# Patient Record
Sex: MALE | Race: WHITE | NOT HISPANIC OR LATINO | Employment: FULL TIME | URBAN - METROPOLITAN AREA
[De-identification: names, ages, dates, MRNs, and addresses within clinical notes are randomized per-mention and may not be internally consistent; named-entity substitution may affect disease eponyms.]

---

## 2017-03-16 ENCOUNTER — OFFICE VISIT (OUTPATIENT)
Dept: SLEEP MEDICINE | Facility: HOSPITAL | Age: 57
End: 2017-03-16

## 2017-03-16 VITALS
SYSTOLIC BLOOD PRESSURE: 146 MMHG | BODY MASS INDEX: 45.1 KG/M2 | WEIGHT: 315 LBS | DIASTOLIC BLOOD PRESSURE: 64 MMHG | OXYGEN SATURATION: 93 % | HEIGHT: 70 IN | HEART RATE: 94 BPM

## 2017-03-16 DIAGNOSIS — G47.10 HYPERSOMNIA: ICD-10-CM

## 2017-03-16 DIAGNOSIS — E66.01 MORBID OBESITY, UNSPECIFIED OBESITY TYPE (HCC): ICD-10-CM

## 2017-03-16 DIAGNOSIS — G47.33 SEVERE OBSTRUCTIVE SLEEP APNEA: Primary | ICD-10-CM

## 2017-03-16 PROCEDURE — 99213 OFFICE O/P EST LOW 20 MIN: CPT | Performed by: INTERNAL MEDICINE

## 2017-03-16 RX ORDER — ARIPIPRAZOLE 5 MG/1
5 TABLET ORAL DAILY
COMMUNITY
End: 2018-08-22

## 2017-03-16 NOTE — PROGRESS NOTES
Subjective:     Chief Complaint:   Chief Complaint   Patient presents with   • Follow-up       HPI:    Joselito Chowdary III is a 57 y.o. male here for follow-up of obstructive sleep apnea.    Since starting PAP sleep problem has: decreased  Currently using PAP: yes Hours of usage during the night: 6    Amount of sleep per night : 6 hours  Average length of time it takes to fall asleep : 10 minutes  Number of awakenings per night : 1     He feels fatigue (tiredness, exhaustion, lethargy) in the daytime even when not sleepy? Infrequently  He feels sleepy (or struggles to stay awake) in the daytime? Infrequently    Laurel Scale scored as 9/24.    Type of mask: full face mask    He (since starting PAP or since last visit) has problems with the following:   Pressure from the mask 0 - No Problem  Skin irritation from the mask 1 - Mild Problems  Mask coming off face 1 - Mild Problems  Air leaks from the mask 1 - Mild Problems  Dry mouth or throat 1 - Mild Problems  Nasal congestion 1 - Mild Problems    I reviewed his PAP download:  Average pressure: 13  Average AHI:  2  Average minutes in large leak per night: 5      Current medications are:   Current Outpatient Prescriptions:   •  ARIPiprazole (ABILIFY) 5 MG tablet, Take 5 mg by mouth Daily., Disp: , Rfl:   •  aspirin 81 MG EC tablet, Take 81 mg by mouth Daily., Disp: , Rfl:   •  B-D UF III MINI PEN NEEDLES 31G X 5 MM misc, , Disp: , Rfl:   •  buPROPion XL (WELLBUTRIN XL) 300 MG 24 hr tablet, , Disp: , Rfl:   •  JARDIANCE 25 MG tablet, , Disp: , Rfl:   •  lamoTRIgine (LaMICtal) 200 MG tablet, , Disp: , Rfl:   •  LANTUS SOLOSTAR 100 UNIT/ML injection pen, , Disp: , Rfl:   •  losartan-hydrochlorothiazide (HYZAAR) 100-25 MG per tablet, , Disp: , Rfl:   •  metFORMIN (GLUCOPHAGE) 500 MG tablet, , Disp: , Rfl:   •  NOVOLOG FLEXPEN 100 UNIT/ML solution pen-injector sc pen, , Disp: , Rfl:   •  ONETOUCH VERIO test strip, , Disp: , Rfl:   •  potassium chloride (K-DUR) 10 MEQ CR  tablet, , Disp: , Rfl:   •  simvastatin (ZOCOR) 20 MG tablet, , Disp: , Rfl:   •  VICTOZA 18 MG/3ML solution pen-injector, , Disp: , Rfl:   •  Cariprazine HCl (VRAYLAR) 1.5 MG capsule capsule, Take 1.5 mg by mouth Daily., Disp: , Rfl:   •  cefdinir (OMNICEF) 300 MG capsule, , Disp: , Rfl:   •  HYDROcod Polst-CPM Polst ER (TUSSIONEX PENNKINETIC) 10-8 MG/5ML ER suspension, , Disp: , Rfl:   •  MethylPREDNISolone (MEDROL, CARLOS,) 4 MG tablet, , Disp: , Rfl:   •  PROAIR  (90 BASE) MCG/ACT inhaler, , Disp: , Rfl: .      The patient's relevant past medical, surgical, family and social history were reviewed and updated in Epic as appropriate.     ROS:    Review of Systems   Constitutional: Positive for fatigue.   HENT: Positive for congestion.    Respiratory: Positive for apnea.          Objective:    Physical Exam   Constitutional: He is oriented to person, place, and time. He appears well-developed and well-nourished.   HENT:   Head: Normocephalic and atraumatic.   Mouth/Throat: Oropharynx is clear and moist.   Class III airway   Neck: Neck supple. No thyromegaly present.   Cardiovascular: Normal rate and regular rhythm.  Exam reveals no gallop and no friction rub.    No murmur heard.  Pulmonary/Chest: Effort normal. No respiratory distress. He has no wheezes. He has no rales.   Abdominal: Soft. Bowel sounds are normal.   Musculoskeletal: He exhibits no edema.   Neurological: He is alert and oriented to person, place, and time.   Skin: Skin is warm and dry.   Psychiatric: He has a normal mood and affect. His behavior is normal.   Vitals reviewed.      Data:    Patient's PAP download was personally reviewed including raw data and results.    Assessment:    Problem List Items Addressed This Visit        Pulmonary Problems    Severe obstructive sleep apnea - Primary       Other    Hypersomnia    Morbid obesity          Hypersomnia improved.  AHI acceptable on download.    Plan:     No change in PAP settings.  No change  in his mask size or type.  Continued efforts at further weight loss.  I urged 7-8 hours of sleep per night on average.  I renewed supplies for the next year.  Follow-up scheduled.  If problems in the interim he knows how to contact us or his [employer] company.  Discussed the above in detail with the patient and any family present.        Signed by  Yuri Mccall MD

## 2017-12-07 ENCOUNTER — OFFICE VISIT (OUTPATIENT)
Dept: SLEEP MEDICINE | Facility: HOSPITAL | Age: 57
End: 2017-12-07

## 2017-12-07 VITALS
OXYGEN SATURATION: 96 % | DIASTOLIC BLOOD PRESSURE: 60 MMHG | HEART RATE: 93 BPM | HEIGHT: 70 IN | BODY MASS INDEX: 44.82 KG/M2 | WEIGHT: 313.05 LBS | SYSTOLIC BLOOD PRESSURE: 130 MMHG

## 2017-12-07 DIAGNOSIS — G47.33 SEVERE OBSTRUCTIVE SLEEP APNEA: Primary | ICD-10-CM

## 2017-12-07 DIAGNOSIS — G47.10 HYPERSOMNIA: ICD-10-CM

## 2017-12-07 DIAGNOSIS — E66.01 MORBID OBESITY (HCC): ICD-10-CM

## 2017-12-07 PROCEDURE — 99213 OFFICE O/P EST LOW 20 MIN: CPT | Performed by: INTERNAL MEDICINE

## 2017-12-07 RX ORDER — INSULIN DEGLUDEC INJECTION 100 U/ML
90 INJECTION, SOLUTION SUBCUTANEOUS
Refills: 8 | COMMUNITY
Start: 2017-11-18 | End: 2021-12-08

## 2017-12-07 NOTE — PROGRESS NOTES
Subjective:     Chief Complaint:   Chief Complaint   Patient presents with   • Follow-up       HPI:    Joselito Chowdary III is a 57 y.o. male here for follow-up of obstructive sleep apnea.    He is using CPAP.  It is auto set with a pressure range of 12-20.  He uses a fullface mask.  He was started on therapy the beginning of the year and has continued on it since then.  Compliance is good.  He complains of ongoing daytime somnolence.  He is averaging 7 hours of usage per night.  His McKean sleepiness scale is elevated at 15.  He does not think any of his medications are causing any excessive drowsiness.    Further details are as follows:    Since last visit sleep problem has: increased  Currently using PAP: yes Hours of usage during the night: 7    Amount of sleep per night : 7 hours  Average length of time it takes to fall asleep : 20 minutes  Number of awakenings per night : 2     He feels fatigue (tiredness, exhaustion, lethargy) in the daytime even when not sleepy? Often  He feels sleepy (or struggles to stay awake) in the daytime? Often    McKean Scale scored as 15/24.    Type of mask: full face mask    He (since starting PAP or since last visit) has problems with the following:   Pressure from the mask 2 - Mild Problems  Skin irritation from the mask 4 - Moderate Problems  Mask coming off face 1 - Mild Problems  Air leaks from the mask 2 - Mild Problems  Dry mouth or throat 2 - Mild Problems  Nasal congestion 1 - Mild Problems    I reviewed his PAP download:  Average pressure: 12  Average AHI:  2  Average minutes in large leak per night: 15      Current medications are:   Current Outpatient Prescriptions:   •  ARIPiprazole (ABILIFY) 5 MG tablet, Take 5 mg by mouth Daily., Disp: , Rfl:   •  aspirin 81 MG EC tablet, Take 81 mg by mouth Daily., Disp: , Rfl:   •  B-D UF III MINI PEN NEEDLES 31G X 5 MM misc, , Disp: , Rfl:   •  buPROPion XL (WELLBUTRIN XL) 300 MG 24 hr tablet, , Disp: , Rfl:   •  HYDROcod  Polst-CPM Polst ER (TUSSIONEX PENNKINETIC) 10-8 MG/5ML ER suspension, , Disp: , Rfl:   •  JARDIANCE 25 MG tablet, , Disp: , Rfl:   •  lamoTRIgine (LaMICtal) 200 MG tablet, , Disp: , Rfl:   •  metFORMIN (GLUCOPHAGE) 500 MG tablet, , Disp: , Rfl:   •  NOVOLOG FLEXPEN 100 UNIT/ML solution pen-injector sc pen, , Disp: , Rfl:   •  ONETOUCH VERIO test strip, , Disp: , Rfl:   •  potassium chloride (K-DUR) 10 MEQ CR tablet, , Disp: , Rfl:   •  simvastatin (ZOCOR) 20 MG tablet, , Disp: , Rfl:   •  TRESIBA FLEXTOUCH 100 UNIT/ML solution pen-injector injection, inject 100 units sub-q at bedtime, Disp: , Rfl: 8  •  VICTOZA 18 MG/3ML solution pen-injector, , Disp: , Rfl:   •  PROAIR  (90 BASE) MCG/ACT inhaler, , Disp: , Rfl: .      The patient's relevant past medical, surgical, family and social history were reviewed and updated in Epic as appropriate.     ROS:    Review of Systems   Constitutional: Positive for fatigue.   Respiratory: Positive for apnea.    Psychiatric/Behavioral: Negative for sleep disturbance.         Objective:    Physical Exam   Constitutional: He is oriented to person, place, and time. He appears well-developed and well-nourished.   HENT:   Head: Normocephalic and atraumatic.   Mouth/Throat: Oropharynx is clear and moist.   Class III airway   Neck: Neck supple. No thyromegaly present.   Cardiovascular: Normal rate and regular rhythm.  Exam reveals no gallop and no friction rub.    No murmur heard.  Pulmonary/Chest: Effort normal. No respiratory distress. He has no wheezes. He has no rales.   Musculoskeletal: He exhibits no edema.   Neurological: He is alert and oriented to person, place, and time.   Skin: Skin is warm and dry.   Psychiatric: He has a normal mood and affect. His behavior is normal.   Vitals reviewed.      Data:    Patient's PAP download was personally reviewed including raw data and results.    Assessment:    Problem List Items Addressed This Visit        Pulmonary Problems    Severe  obstructive sleep apnea - Primary    Relevant Orders    Polysomnography 4 or More Parameters With CPAP       Other    Hypersomnia    Relevant Orders    Polysomnography 4 or More Parameters With CPAP    Morbid obesity          Ongoing sleepiness despite what appears to be adequate treatment for his obstructive sleep apnea with auto CPAP.  Compliance is good.  Mask fit is good.  Total sleep time is around 7 hours per night.  AHI on the download is normal.    Plan:     1. He needs a full night in lab titration to assess efficacy of his CPAP given the fact that he has ongoing sleepiness.  2. Long-term weight loss  3. Discussed all the above with the patient and I will see him in follow-up after his titration is complete.      Discussed in detail with the patient.  He will call prior to his follow up visit for any new problems.    Signed by  Yuri Mccall MD

## 2017-12-28 ENCOUNTER — HOSPITAL ENCOUNTER (OUTPATIENT)
Dept: SLEEP MEDICINE | Facility: HOSPITAL | Age: 57
Discharge: HOME | End: 2017-12-28
Attending: INTERNAL MEDICINE | Admitting: INTERNAL MEDICINE

## 2017-12-28 VITALS
BODY MASS INDEX: 45.07 KG/M2 | WEIGHT: 314.82 LBS | OXYGEN SATURATION: 95 % | HEART RATE: 86 BPM | SYSTOLIC BLOOD PRESSURE: 119 MMHG | HEIGHT: 70 IN | DIASTOLIC BLOOD PRESSURE: 71 MMHG

## 2017-12-28 DIAGNOSIS — G47.10 HYPERSOMNIA: ICD-10-CM

## 2017-12-28 DIAGNOSIS — G47.33 SEVERE OBSTRUCTIVE SLEEP APNEA: ICD-10-CM

## 2017-12-28 PROCEDURE — 95811 POLYSOM 6/>YRS CPAP 4/> PARM: CPT | Performed by: INTERNAL MEDICINE

## 2017-12-28 PROCEDURE — 95811 POLYSOM 6/>YRS CPAP 4/> PARM: CPT

## 2017-12-29 DIAGNOSIS — R06.83 SNORING: ICD-10-CM

## 2017-12-29 DIAGNOSIS — G47.33 OSA (OBSTRUCTIVE SLEEP APNEA): Primary | ICD-10-CM

## 2018-01-02 ENCOUNTER — DOCUMENTATION (OUTPATIENT)
Dept: SLEEP MEDICINE | Facility: HOSPITAL | Age: 58
End: 2018-01-02

## 2018-01-02 NOTE — PROGRESS NOTES
Left message for patient to call back regarding sleep study. He just needs to be advised that his minimum pressure has been increased via [employer] following his 12/28/17 titration.  The modem should push it through in a couple of days.  He can call the DME if it does not go through.  Order is pended to staff to fax after Dr. Mccall signs it.  He also needs a follow-up visit scheduled.

## 2018-03-20 ENCOUNTER — OFFICE VISIT (OUTPATIENT)
Dept: SLEEP MEDICINE | Facility: HOSPITAL | Age: 58
End: 2018-03-20

## 2018-03-20 VITALS
BODY MASS INDEX: 44.09 KG/M2 | HEART RATE: 95 BPM | HEIGHT: 70 IN | DIASTOLIC BLOOD PRESSURE: 66 MMHG | SYSTOLIC BLOOD PRESSURE: 140 MMHG | WEIGHT: 308 LBS | OXYGEN SATURATION: 96 %

## 2018-03-20 DIAGNOSIS — G47.33 SEVERE OBSTRUCTIVE SLEEP APNEA: ICD-10-CM

## 2018-03-20 DIAGNOSIS — G47.10 HYPERSOMNIA: Primary | ICD-10-CM

## 2018-03-20 DIAGNOSIS — E66.01 MORBID OBESITY (HCC): ICD-10-CM

## 2018-03-20 PROCEDURE — 99214 OFFICE O/P EST MOD 30 MIN: CPT | Performed by: INTERNAL MEDICINE

## 2018-03-20 RX ORDER — DAPAGLIFLOZIN 10 MG/1
1 TABLET, FILM COATED ORAL DAILY
Refills: 8 | COMMUNITY
Start: 2018-02-02 | End: 2021-07-12

## 2018-03-20 NOTE — PROGRESS NOTES
Subjective:     Chief Complaint:   Chief Complaint   Patient presents with   • Follow-up       HPI:    Joselito Chowdary III is a 58 y.o. male here for follow-up of obstructive sleep apnea.    He underwent a titration study.  His minimum auto pressure was increased to 14 based upon this study.  He continues to use auto CPAP at a range of 14-20.  He uses an [place] facemask.  He notes some leak at the lower aspect of the mask.    He continues to have daytime somnolence.  His [place] Sleepiness Scale is around 15.  He sleeps about 6 hours a night wrist upon his best estimation.  He uses his CPAP just under 7 hours a night on average.    Further details are as follows:    Since last visit sleep problem has: remained the same  Currently using PAP: yes Hours of usage during the night: 6    Amount of sleep per night : 6 hours  Average length of time it takes to fall asleep : 20 minutes  Number of awakenings per night : 3     He feels fatigue (tiredness, exhaustion, lethargy) in the daytime even when not sleepy? Often  He feels sleepy (or struggles to stay awake) in the daytime? Often    [place] Scale scored as 15/24.    Type of mask: full face mask    He (since starting PAP or since last visit) has problems with the following:   Pressure from the mask 4 - Moderate Problems  Skin irritation from the mask 5 - Moderate Problems  Mask coming off face 3 - Mild Problems  Air leaks from the mask 6 - Moderate Problems  Dry mouth or throat 3 - Mild Problems  Nasal congestion 6 - Moderate Problems    I reviewed his PAP download:  Average pressure: 13  Average AHI:  2  Average minutes in large leak per night: 35      Current medications are:   Current Outpatient Prescriptions:   •  ARIPiprazole (ABILIFY) 5 MG tablet, Take 5 mg by mouth Daily., Disp: , Rfl:   •  aspirin 81 MG EC tablet, Take 81 mg by mouth Daily., Disp: , Rfl:   •  B-D UF III MINI PEN NEEDLES 31G X 5 MM misc, , Disp: , Rfl:   •  buPROPion XL (WELLBUTRIN XL) 300  MG 24 hr tablet, , Disp: , Rfl:   •  FARXIGA 10 MG tablet, Take 1 tablet by mouth Daily. 200001, Disp: , Rfl: 8  •  HYDROcod Polst-CPM Polst ER (TUSSIONEX PENNKINETIC) 10-8 MG/5ML ER suspension, , Disp: , Rfl:   •  lamoTRIgine (LaMICtal) 200 MG tablet, , Disp: , Rfl:   •  metFORMIN (GLUCOPHAGE) 500 MG tablet, , Disp: , Rfl:   •  NOVOLOG FLEXPEN 100 UNIT/ML solution pen-injector sc pen, , Disp: , Rfl:   •  ONETOUCH VERIO test strip, , Disp: , Rfl:   •  potassium chloride (K-DUR) 10 MEQ CR tablet, , Disp: , Rfl:   •  PROAIR  (90 BASE) MCG/ACT inhaler, , Disp: , Rfl:   •  simvastatin (ZOCOR) 20 MG tablet, , Disp: , Rfl:   •  TRESIBA FLEXTOUCH 100 UNIT/ML solution pen-injector injection, inject 100 units sub-q at bedtime, Disp: , Rfl: 8  •  VICTOZA 18 MG/3ML solution pen-injector, , Disp: , Rfl: .      The patient's relevant past medical, surgical, family and social history were reviewed and updated in Epic as appropriate.     ROS:    Review of Systems   Constitutional: Positive for fatigue.   Respiratory: Positive for apnea.    Psychiatric/Behavioral: Positive for sleep disturbance.         Objective:    Physical Exam   Constitutional: He is oriented to person, place, and time. He appears well-developed and well-nourished.   HENT:   Head: Normocephalic and atraumatic.   Mouth/Throat: Oropharynx is clear and moist.   Class III airway   Neck: Neck supple. No thyromegaly present.   Cardiovascular: Normal rate and regular rhythm.  Exam reveals no gallop and no friction rub.    No murmur heard.  Pulmonary/Chest: Effort normal. No respiratory distress. He has no wheezes. He has no rales.   Musculoskeletal: He exhibits no edema.   Neurological: He is alert and oriented to person, place, and time.   Skin: Skin is warm and dry.   Psychiatric: He has a normal mood and affect. His behavior is normal.   Vitals reviewed.      Data:    Patient's PAP download was personally reviewed including raw data and  results.    Assessment:    Problem List Items Addressed This Visit        Pulmonary Problems    Severe obstructive sleep apnea       Other    Hypersomnia - Primary    Morbid obesity      Other Visit Diagnoses    None.         He has continued daytime somnolence despite optimal treatment of his obstructive sleep apnea based upon a titration study and his download.  A few caveats remain.  I think he should get closer to 8 hours of sleep per night and I discussed this with him frankly.  He also has some mask leak when his mask deteriorates but it is not severe and I don't think this is the cause of his sleepiness.  He does need to renew his mask on a regular basis and when it starts to leak.    Plan:     1. As above.  Increase sleep time.   2. Given the above, I think his sleepiness could be due to his medication therapy.  The pros and cons of his individual medications need to be weighed by his mental health professional.  Certainly, uncontrolled psychiatric/mood disorders can be a cause of sleepiness as well.  3. Discussed all the above with the patient.  Cautioned him against drowsy driving and any additional sedative use.  4. Closer than usual follow-up.      Discussed in detail with the patient.  He will call prior to his follow up visit for any new problems.    Signed by  Yuri Mccall MD

## 2018-04-21 ENCOUNTER — APPOINTMENT (OUTPATIENT)
Dept: GENERAL RADIOLOGY | Facility: HOSPITAL | Age: 58
End: 2018-04-21

## 2018-04-21 ENCOUNTER — HOSPITAL ENCOUNTER (EMERGENCY)
Facility: HOSPITAL | Age: 58
Discharge: HOME | End: 2018-04-21
Attending: EMERGENCY MEDICINE | Admitting: EMERGENCY MEDICINE

## 2018-04-21 VITALS
WEIGHT: 315 LBS | HEIGHT: 70 IN | RESPIRATION RATE: 16 BRPM | HEART RATE: 84 BPM | SYSTOLIC BLOOD PRESSURE: 150 MMHG | BODY MASS INDEX: 45.1 KG/M2 | DIASTOLIC BLOOD PRESSURE: 84 MMHG | OXYGEN SATURATION: 95 % | TEMPERATURE: 98 F

## 2018-04-21 DIAGNOSIS — M25.511 ARTHRALGIA OF RIGHT SHOULDER REGION: ICD-10-CM

## 2018-04-21 DIAGNOSIS — S40.011A CONTUSION OF RIGHT SHOULDER, INITIAL ENCOUNTER: Primary | ICD-10-CM

## 2018-04-21 PROCEDURE — 73030 X-RAY EXAM OF SHOULDER: CPT

## 2018-04-21 PROCEDURE — 99283 EMERGENCY DEPT VISIT LOW MDM: CPT

## 2018-04-21 RX ORDER — HYDROCODONE BITARTRATE AND ACETAMINOPHEN 7.5; 325 MG/1; MG/1
1 TABLET ORAL EVERY 6 HOURS PRN
Qty: 12 TABLET | Refills: 0 | Status: SHIPPED | OUTPATIENT
Start: 2018-04-21 | End: 2018-07-18

## 2018-04-21 RX ORDER — HYDROCODONE BITARTRATE AND ACETAMINOPHEN 7.5; 325 MG/1; MG/1
1 TABLET ORAL ONCE
Status: COMPLETED | OUTPATIENT
Start: 2018-04-21 | End: 2018-04-21

## 2018-04-21 RX ADMIN — HYDROCODONE BITARTRATE AND ACETAMINOPHEN 1 TABLET: 7.5; 325 TABLET ORAL at 15:12

## 2018-04-21 NOTE — ED PROVIDER NOTES
Subjective   58 year-old male presents to emergency for complaining of right shoulder pain after a fall in ER.  Patient states he was weeding ER when he slipped and fell landing on the lateral aspect of right shoulder.  His been unable to move the arm at the shoulder since.  He denies other symptoms or injuries.        Upper Extremity Issue   Location:  Shoulder  Shoulder location:  R shoulder  Injury: yes    Time since incident:  1 hour  Mechanism of injury comment:  Slip fall on the ground while weed eating.  Pain details:     Quality:  Aching and throbbing    Radiates to:  R shoulder    Severity:  Severe    Onset quality:  Sudden    Duration:  1 hour    Timing:  Constant    Progression:  Unchanged  Handedness:  Right-handed  Dislocation: no    Foreign body present:  No foreign bodies  Tetanus status:  Up to date  Prior injury to area:  No  Relieved by:  Immobilization  Worsened by:  Movement  Ineffective treatments:  None tried  Associated symptoms: decreased range of motion    Associated symptoms: no back pain, no muscle weakness, no neck pain, no numbness, no stiffness, no swelling and no tingling        Review of Systems   Musculoskeletal: Negative for back pain, neck pain and stiffness.        Per HPI   All other systems reviewed and are negative.      Past Medical History:   Diagnosis Date   • Diabetes mellitus    • Hypertension    • Respiratory infection        Allergies   Allergen Reactions   • Zestoretic [Lisinopril-Hydrochlorothiazide] Cough       Past Surgical History:   Procedure Laterality Date   • CARDIAC CATHETERIZATION     • CHOLECYSTECTOMY     • GALLBLADDER SURGERY         Family History   Problem Relation Age of Onset   • Cancer Mother    • Diabetes Father    • Heart disease Father    • Hypertension Father    • COPD Father        Social History     Social History   • Marital status:      Social History Main Topics   • Smoking status: Former Smoker   • Smokeless tobacco: Never Used   •  Alcohol use No   • Drug use: No     Other Topics Concern   • Not on file           Objective   Physical Exam   Constitutional: He is oriented to person, place, and time. He appears well-developed and well-nourished. No distress.   HENT:   Head: Normocephalic and atraumatic.   Right Ear: External ear normal.   Left Ear: External ear normal.   Nose: Nose normal.   Mouth/Throat: Oropharynx is clear and moist. No oropharyngeal exudate.   Eyes: Conjunctivae and EOM are normal. Pupils are equal, round, and reactive to light. Right eye exhibits no discharge. Left eye exhibits no discharge. No scleral icterus.   Neck: Normal range of motion. Neck supple. No JVD present. No tracheal deviation present. No thyromegaly present.   Cardiovascular: Normal rate and regular rhythm.    No murmur heard.  Pulmonary/Chest: Effort normal. No stridor. No respiratory distress.   Abdominal: Soft. He exhibits no distension.   Musculoskeletal: Normal range of motion. He exhibits tenderness. He exhibits no edema or deformity.   Tenderness to palpation to right shoulder diffusely with no obvious deformity.  He does have pain to the proximal humerus as well as the acromioclavicular aspect of the shoulder.  Range of motion is minimal due to pain level and guarding.  Capillary refill is brisk, radial pulse is strong and intact, elbow is with full range of motion, he is able to pronate and supinate the forearm without difficulty.   Neurological: He is alert and oriented to person, place, and time. No cranial nerve deficit. He exhibits normal muscle tone. Coordination normal.   Skin: Skin is warm and dry. No rash noted. He is not diaphoretic. No erythema. No pallor.   Psychiatric: He has a normal mood and affect. His behavior is normal. Judgment and thought content normal.   Nursing note and vitals reviewed.      Procedures        No results found for this or any previous visit (from the past 24 hour(s)).  Note: In addition to lab results from this  "visit, the labs listed above may include labs taken at another facility or during a different encounter within the last 24 hours. Please correlate lab times with ED admission and discharge times for further clarification of the services performed during this visit.    XR Shoulder 2+ View Right   Final Result   Chronic appearing findings.       This report was finalized on 4/21/2018 4:03 PM by Evens Spicer MD.        Vitals:    04/21/18 1347   BP: 150/84   BP Location: Left arm   Patient Position: Sitting   Pulse: 84   Resp: 16   Temp: 98 °F (36.7 °C)   TempSrc: Oral   SpO2: 95%   Weight: (!) 143 kg (315 lb 3.2 oz)   Height: 177.8 cm (70\")     Medications   HYDROcodone-acetaminophen (NORCO) 7.5-325 MG per tablet 1 tablet (1 tablet Oral Given 4/21/18 1512)     ECG/EMG Results (last 24 hours)     ** No results found for the last 24 hours. **            ED Course  ED Course   Comment By Time   No acute findings per x-ray.  I reviewed with Dr. Busch.  We will immobilize, refer to ortho for further evaluation.  Patient prefers referral to Dr. Mares. Nelson Juarez PA-C 04/21 2774                  [facility]    Final diagnoses:   Contusion of right shoulder, initial encounter   Arthralgia of right shoulder region            Nelson Juarez PA-C  04/21/18 2279    "

## 2018-04-21 NOTE — DISCHARGE INSTRUCTIONS
Cold compresses every 2-3 hours for 20 minutes today, warm compresses several similar fashion tomorrow.  Wear sling and swath for at least 3 days.  Follow-up with your primary care provider for recheck in 3 days.  Follow-up with Dr. Mares for orthopedic evaluation and further evaluation of her shoulder.  Return to the emergency department immediately if any change or worsening of symptoms.

## 2018-04-30 ENCOUNTER — OFFICE VISIT (OUTPATIENT)
Dept: ORTHOPEDIC SURGERY | Facility: CLINIC | Age: 58
End: 2018-04-30

## 2018-04-30 VITALS
BODY MASS INDEX: 44.19 KG/M2 | DIASTOLIC BLOOD PRESSURE: 100 MMHG | SYSTOLIC BLOOD PRESSURE: 183 MMHG | WEIGHT: 308.64 LBS | HEART RATE: 90 BPM | HEIGHT: 70 IN

## 2018-04-30 DIAGNOSIS — M25.511 ACUTE PAIN OF RIGHT SHOULDER: Primary | ICD-10-CM

## 2018-04-30 PROCEDURE — 99203 OFFICE O/P NEW LOW 30 MIN: CPT | Performed by: PHYSICIAN ASSISTANT

## 2018-04-30 RX ORDER — LOSARTAN POTASSIUM AND HYDROCHLOROTHIAZIDE 25; 100 MG/1; MG/1
1 TABLET ORAL DAILY
Refills: 5 | COMMUNITY
Start: 2018-03-22 | End: 2021-12-08

## 2018-04-30 RX ORDER — BREXPIPRAZOLE 2 MG/1
TABLET ORAL
COMMUNITY
Start: 2018-04-27 | End: 2021-01-05

## 2018-04-30 NOTE — PROGRESS NOTES
[facility] – [place], [place] Orthopaedic Surgery Clinic Note    Subjective     Pain of the Right Shoulder      HPI    Joselito Chowdary III is a 58 y.o. male. Right-hand-dominant.  Patient presents to the orthopedic clinic complaining of right shoulder pain.  He states approximately 9 days ago he was weed eating, slipped and fell on the lateral aspect of his shoulder.  Now he has pain and can't lift his arm more than 45° from his body.  He denies any other history of injury or trauma.  He denies any numbness or tingling into the extremity.  Pain scale reports maximum 6/10.  Severity the pain is moderate to severe.  Quality the pain is aching with intermittent sharpness and stabbing.  Associated symptoms include stiffness as well as symptoms related to giving away.  No reported fever, chills, night sweats or other constitutional symptoms.    DOI: 4/21/2018    Past Medical History:   Diagnosis Date   • Diabetes mellitus    • Hypertension    • Respiratory infection       Past Surgical History:   Procedure Laterality Date   • CARDIAC CATHETERIZATION     • CHOLECYSTECTOMY     • GALLBLADDER SURGERY        Family History   Problem Relation Age of Onset   • Cancer Mother    • Hypertension Mother    • Diabetes Father    • Heart disease Father    • Hypertension Father    • COPD Father      Social History     Social History   • Marital status:      Spouse name: N/A   • Number of children: N/A   • Years of education: N/A     Occupational History   • Not on file.     Social History Main Topics   • Smoking status: Former Smoker     Packs/day: 1.00     Years: 8.00     Types: Cigarettes     Start date: 1978     Quit date: 1986   • Smokeless tobacco: Never Used   • Alcohol use No   • Drug use: No   • Sexual activity: Defer     Other Topics Concern   • Not on file     Social History Narrative   • No narrative on file      Current Outpatient Prescriptions on File Prior to Visit   Medication Sig Dispense Refill   • ARIPiprazole (ABILIFY) 5 MG tablet  "Take 5 mg by mouth Daily.     • aspirin 81 MG EC tablet Take 81 mg by mouth Daily.     • B-D UF III MINI PEN NEEDLES 31G X 5 MM misc      • buPROPion XL (WELLBUTRIN XL) 300 MG 24 hr tablet      • FARXIGA 10 MG tablet Take 1 tablet by mouth Daily. 200001  8   • HYDROcod Polst-CPM Polst ER (TUSSIONEX PENNKINETIC) 10-8 MG/5ML ER suspension      • HYDROcodone-acetaminophen (NORCO) 7.5-325 MG per tablet Take 1 tablet by mouth Every 6 (Six) Hours As Needed for Moderate Pain . 12 tablet 0   • lamoTRIgine (LaMICtal) 200 MG tablet      • metFORMIN (GLUCOPHAGE) 500 MG tablet      • NOVOLOG FLEXPEN 100 UNIT/ML solution pen-injector sc pen      • ONETOUCH VERIO test strip      • potassium chloride (K-DUR) 10 MEQ CR tablet      • PROAIR  (90 BASE) MCG/ACT inhaler      • simvastatin (ZOCOR) 20 MG tablet      • TRESIBA FLEXTOUCH 100 UNIT/ML solution pen-injector injection inject 100 units sub-q at bedtime  8   • VICTOZA 18 MG/3ML solution pen-injector        No current facility-administered medications on file prior to visit.       Allergies   Allergen Reactions   • Zestoretic [Lisinopril-Hydrochlorothiazide] Cough        The following portions of the patient's history were reviewed and updated as appropriate: allergies, current medications, past family history, past medical history, past social history, past surgical history and problem list.    Review of Systems   Constitutional: Negative.    HENT: Negative.    Eyes: Negative.    Respiratory: Negative.    Cardiovascular: Negative.    Gastrointestinal: Negative.    Endocrine: Negative.    Genitourinary: Negative.    Musculoskeletal: Positive for arthralgias (shoulder pain).   Skin: Negative.    Allergic/Immunologic: Negative.    Neurological: Negative.    Hematological: Negative.    Psychiatric/Behavioral: Negative.         Objective      Physical Exam  BP (!) 183/100   Pulse 90   Ht 177.8 cm (70\")   Wt (!) 140 kg (308 lb 10.3 oz)   BMI 44.29 kg/m²     Body mass index " is 44.29 kg/m².    General  Mental Status - alert  General Appearance - cooperative, well groomed, not in acute distress  Orientation - Oriented X3  Build & Nutrition - well developed and well nourished  Posture - normal posture  Gait - normal gait     Integumentary  Global Assessment  Examination of related systems reveals - no lymphadenopathy  General Characteristics  Overall examination of the patient's skin reveals - no rashes, no evidence of scars, no suspicious lesions and no bruises.  Color - normal coloration of skin.    Ortho Exam  Musculoskeletal   Upper Extremity   Right Shoulder     Inspection and Palpation:     Tenderness - primary lateral aspect of the shoulder some mild discomfort anteriorly    Crepitus - none    Sensation is normal    Examination reveals no ecchymosis.        Strength and Tone:    Supraspinatus -3+/5     External Rotators-4/5    Infraspinatus - 5/5    Subscapularis - 4/5    Deltoid - 5/5     Range of Motion   Left shoulder:    Internal Rotation: ROM - T7    External Rotation: AROM - 80 degrees    Elevation through flexion: AROM - 180 degrees    Right Shoulder:    Internal Rotation: ROM - back right hip pocket    External Rotation: AROM - 45 degrees    Elevation through flexion: AROM - 45 degrees      Instability   Right shoulder    Sulcus sign negative    Apprehension test negative    Roberta relocation test negative    Jerk test negative     Impingement   Right shoulder    Araya-Santy impingement test negative    Neer impingement test mild discomfort     Functional Testing   Right shoulder    AC crossover adduction test mild discomfort    Abdominal compression test negative    Lift-off modified positive    Speed's test positive    Eden's test unable to get into position        Imaging/Studies  Reviewed 3 views of the right shoulder from 4/21/18.  No acute bony abnormality, fracture or dislocation noted.  Mild degenerative arthritic changes noted to the acromioclavicular joint and  mild degenerative changes noted to the glenohumeral joint.  See chart for official report.    Assessment:  1. Acute pain of right shoulder        Plan:  1. Discussion was had patient regarding exam, imaging, assessment and treatment options.  2. Patient has an MRI scheduled for Wednesday.  3. He has pain medication from the emergency room.  Patient is going down to over-the-counter acetaminophen/Tylenol when able.  4. Patient will follow back up next week after the MRI is completed to discuss results and treatment options.  5. Questions and concerns were answered.      Medical Decision Making  Management Options : prescription/IM medicine  Data/Risk: radiology tests    Eleanor Pearce PA-C  04/30/18  2:46 PM

## 2018-05-01 ENCOUNTER — TELEPHONE (OUTPATIENT)
Dept: ORTHOPEDIC SURGERY | Facility: CLINIC | Age: 58
End: 2018-05-01

## 2018-05-01 DIAGNOSIS — M25.511 ACUTE PAIN OF RIGHT SHOULDER: Primary | ICD-10-CM

## 2018-05-01 DIAGNOSIS — M75.101 TEAR OF RIGHT ROTATOR CUFF, UNSPECIFIED TEAR EXTENT: ICD-10-CM

## 2018-05-01 NOTE — TELEPHONE ENCOUNTER
This patient was supposed to have an MRI done of his shoulder tomorrow that was ordered by his PCP. His insurance would not approve it and he wanted to know if you could order one through our office and try and get it approved. Please advise.

## 2018-05-03 NOTE — TELEPHONE ENCOUNTER
MRI ordered - f/u after completed. If insurance still won't approve then f/u to discuss next step in care.

## 2018-05-05 ENCOUNTER — HOSPITAL ENCOUNTER (OUTPATIENT)
Dept: MRI IMAGING | Facility: HOSPITAL | Age: 58
Discharge: HOME | End: 2018-05-05
Admitting: PHYSICIAN ASSISTANT

## 2018-05-05 DIAGNOSIS — M75.101 TEAR OF RIGHT ROTATOR CUFF, UNSPECIFIED TEAR EXTENT: ICD-10-CM

## 2018-05-05 DIAGNOSIS — M25.511 ACUTE PAIN OF RIGHT SHOULDER: ICD-10-CM

## 2018-05-05 PROCEDURE — 73221 MRI JOINT UPR EXTREM W/O DYE: CPT

## 2018-05-07 ENCOUNTER — OFFICE VISIT (OUTPATIENT)
Dept: ORTHOPEDIC SURGERY | Facility: CLINIC | Age: 58
End: 2018-05-07

## 2018-05-07 ENCOUNTER — TELEPHONE (OUTPATIENT)
Dept: ORTHOPEDIC SURGERY | Facility: CLINIC | Age: 58
End: 2018-05-07

## 2018-05-07 VITALS — WEIGHT: 315 LBS | HEIGHT: 70 IN | BODY MASS INDEX: 45.1 KG/M2

## 2018-05-07 DIAGNOSIS — M75.121 COMPLETE TEAR OF RIGHT ROTATOR CUFF: Primary | ICD-10-CM

## 2018-05-07 PROCEDURE — 99213 OFFICE O/P EST LOW 20 MIN: CPT | Performed by: PHYSICIAN ASSISTANT

## 2018-05-07 NOTE — PROGRESS NOTES
"    [facility] – [place] Orthopaedic Surgery Clinic Note    Subjective     CC: Follow-up of the Right Shoulder (Post MRI)      HPI    Joselito Chowdary III is a 58 y.o. male. Patient returns to go over his MRI results.  He is approximately 2 weeks out from date of injury to his right shoulder (4/21/2018).  No significant change in pain with 5/10 for a pain scale.  Severity the pain remains moderate.  Quality the pain is once again aching with intermittent sharpness and stabbing.  No reported numbness or tingling into the distal extremity.  He denies any fever, chills, night sweats or other constitutional symptoms.      ROS:    Constiutional:Pt denies fever, chills, nausea, or vomiting.  MSK:as above    Objective      Past Medical History  Past Medical History:   Diagnosis Date   • Diabetes mellitus    • Hypertension    • Respiratory infection          Physical Exam  Ht 177.8 cm (70\")   Wt (!) 144 kg (317 lb 7.4 oz)   BMI 45.55 kg/m²     Body mass index is 45.55 kg/m².    Patient is well nourished and well developed.        Ortho Exam  Right shoulder  Skin intact without redness, warmth or swelling noted.  Tenderness lateral aspect of the shoulder with discomfort also to posterior and anterior shoulder.  Motion: Remains limited to 45° forward flexion, external rotation and back right hip pocket internal rotation  Motor/sensory: Grossly intact C4 to T1.    Imaging/Labs/EMG Reviewed:  MRI performed on 5/5/2018 was reviewed today. Evidence of complete full thickness tear with retraction supraspinatus.  Also evidence of tear along the infraspinatus and subscapularis.  Fraying noted to the labrum with degenerative changes.  Biceps tendon appears intact.  Significant amount of edema and joint effusion noted areas see chart for official report.    Assessment:  1. Complete tear of right rotator cuff        Plan:  1. Discussion was had with the patient regarding exam, imaging, assessment and treatment options.  2. Based on the MRI findings " of cuff tear with retraction I have referred the patient to Dr. Ortiz to discuss surgical intervention.  3. He'll follow up next week with Dr. Ortiz.  At that time will be determined what are his best treatment options.  4. Patient needs to discuss his possible need for surgery with his work before proceeding on.  5. Questions and concerns were answered.        Medical Decision Making  Data/Risk: radiology tests    Eleanor Pearce PA-C  05/07/18  1:56 PM

## 2018-05-09 ENCOUNTER — OFFICE VISIT (OUTPATIENT)
Dept: ORTHOPEDIC SURGERY | Facility: CLINIC | Age: 58
End: 2018-05-09

## 2018-05-09 VITALS
SYSTOLIC BLOOD PRESSURE: 161 MMHG | WEIGHT: 308.64 LBS | HEART RATE: 96 BPM | BODY MASS INDEX: 44.19 KG/M2 | DIASTOLIC BLOOD PRESSURE: 81 MMHG | HEIGHT: 70 IN

## 2018-05-09 DIAGNOSIS — M75.121 COMPLETE TEAR OF RIGHT ROTATOR CUFF: Primary | ICD-10-CM

## 2018-05-09 PROCEDURE — 99214 OFFICE O/P EST MOD 30 MIN: CPT | Performed by: ORTHOPAEDIC SURGERY

## 2018-05-09 NOTE — PROGRESS NOTES
[facility] – [place] Orthopaedic Surgery Clinic Note    Subjective     Chief Complaint   Patient presents with   • Right Shoulder - Pain        HPI      Joselito Chowdary III is a 58 y.o. male.  He complains of right shoulder pain.  He had a slip and fall at home April 21 and tore his right shoulder rotator cuff.  He presented to the emergency room saws primary care physician and was referred to me from Eleanor Pearce.  His pain is 5 out of 10 and throbbing.  He's unable to raise his arm completely.  He works in transport at the hospital.        Past Medical History:   Diagnosis Date   • Diabetes mellitus    • Hypertension    • Respiratory infection       Past Surgical History:   Procedure Laterality Date   • CARDIAC CATHETERIZATION     • CHOLECYSTECTOMY     • GALLBLADDER SURGERY        Family History   Problem Relation Age of Onset   • Cancer Mother    • Hypertension Mother    • Diabetes Father    • Heart disease Father    • Hypertension Father    • COPD Father      Social History     Social History   • Marital status:      Spouse name: N/A   • Number of children: N/A   • Years of education: N/A     Occupational History   • Not on file.     Social History Main Topics   • Smoking status: Former Smoker     Packs/day: 1.00     Years: 8.00     Types: Cigarettes     Start date: 1978     Quit date: 1986   • Smokeless tobacco: Never Used   • Alcohol use No   • Drug use: No   • Sexual activity: Defer     Other Topics Concern   • Not on file     Social History Narrative   • No narrative on file      Current Outpatient Prescriptions on File Prior to Visit   Medication Sig Dispense Refill   • ARIPiprazole (ABILIFY) 5 MG tablet Take 5 mg by mouth Daily.     • aspirin 81 MG EC tablet Take 81 mg by mouth Daily.     • B-D UF III MINI PEN NEEDLES 31G X 5 MM misc      • buPROPion XL (WELLBUTRIN XL) 300 MG 24 hr tablet      • FARXIGA 10 MG tablet Take 1 tablet by mouth Daily. 200001 8   • HYDROcod Polst-CPM Polst ER (TUSSIONEX  "PENNKINETIC) 10-8 MG/5ML ER suspension      • HYDROcodone-acetaminophen (NORCO) 7.5-325 MG per tablet Take 1 tablet by mouth Every 6 (Six) Hours As Needed for Moderate Pain . 12 tablet 0   • lamoTRIgine (LaMICtal) 200 MG tablet      • losartan-hydrochlorothiazide (HYZAAR) 100-25 MG per tablet Take 1 tablet by mouth Daily. 200001  5   • metFORMIN (GLUCOPHAGE) 500 MG tablet      • NOVOLOG FLEXPEN 100 UNIT/ML solution pen-injector sc pen      • ONETOUCH VERIO test strip      • potassium chloride (K-DUR) 10 MEQ CR tablet      • PROAIR  (90 BASE) MCG/ACT inhaler      • REXULTI 2 MG tablet      • simvastatin (ZOCOR) 20 MG tablet      • TRESIBA FLEXTOUCH 100 UNIT/ML solution pen-injector injection inject 100 units sub-q at bedtime  8   • VICTOZA 18 MG/3ML solution pen-injector        No current facility-administered medications on file prior to visit.       Allergies   Allergen Reactions   • Zestoretic [Lisinopril-Hydrochlorothiazide] Cough        The following portions of the patient's history were reviewed and updated as appropriate: allergies, current medications, past family history, past medical history, past social history, past surgical history and problem list.    Review of Systems   Constitutional: Negative.    HENT: Negative.    Eyes: Negative.    Respiratory: Negative.    Cardiovascular: Negative.    Gastrointestinal: Negative.    Endocrine: Negative.    Genitourinary: Negative.    Musculoskeletal: Positive for arthralgias.   Skin: Negative.    Allergic/Immunologic: Negative.    Neurological: Negative.    Hematological: Negative.    Psychiatric/Behavioral: Positive for decreased concentration.        Objective      Physical Exam  /81   Pulse 96   Ht 177.8 cm (70\")   Wt (!) 140 kg (308 lb 10.3 oz)   BMI 44.29 kg/m²     Body mass index is 44.29 kg/m².        GENERAL APPEARANCE: awake, alert & oriented x 3, in no acute distress and well developed, well nourished  PSYCH: normal mood and " affect  LUNGS:  breathing nonlabored, no wheezing  EYES: sclera anicteric, pupils equal  CARDIOVASCULAR: palpable pulses dorsalis pedis, palpable posterior tibial bilaterally. Capillary refill less than 2 seconds  INTEGUMENTARY: skin intact, no clubbing, cyanosis  NEUROLOGIC:  Normal Sensation and reflexes             Ortho Exam  Musculoskeletal   Upper Extremity   Right Shoulder     Inspection and Palpation:     Tenderness - none    Crepitus - none    Sensation is normal    Examination reveals no ecchymosis.      Strength and Tone:    Supraspinatus,  Infraspinatus - 4/5    Subscapularis - 5/5    Deltoid - 5/5     Range of Motion   Left shoulder:    Internal Rotation: ROM - T7    External Rotation: AROM - 180 degrees    Elevation through flexion: AROM - 180 degrees    Right Shoulder:    Internal Rotation: ROM - T7    External Rotation: AROM - 80 degrees    Elevation through flexion: AROM - 80 degrees     Abduction -80 degrees     Instability   Right shoulder    Sulcus sign negative    Apprehension test negative    Roberta relocation test negative    Jerk test negative   Impingement   Right shoulder    Araya impingement test positive    Neer impingement test positive   Functional Testing   Right shoulder    AC crossover adduction test negative    Abdominal compression test negative    Lift-off sign negative    Speed's test negative    Eden's test negative    Horriblower's sign negative       Imaging/Studies  Imaging Results (last 7 days)     ** No results found for the last 168 hours. **      I reviewed his MRI which shows a massive retracted full thickness tear of the supraspinatus and infraspinatus tendons    Assessment/Plan        ICD-10-CM ICD-9-CM   1. Complete tear of right rotator cuff M75.121 727.61     I recommend right shoulder arthroscopy with rotator cuff repair.  He does have diabetes which will complicate his healing.  He will be in a sling for 1 month.  Restrictions for a minimum of 4 months but most  likely 6 months because of the size of his tear.Treatment options and alternatives were discussed with patient and family.  Surgical versus non surgical treatment options were discussed as well.    We reviewed the nature of the planned procedure including the risks, benefits and potential complications.  Understanding was expressed and the decision was made to proceed with surgery.    Medical Decision Making  Management Options : over-the-counter medicine and major surgery with risk factors  Data/Risk: radiology tests and independent visualization of imaging, lab tests, or EMG/NCV    Isael Ortiz MD  05/09/18  9:35 AM

## 2018-05-15 ENCOUNTER — TELEPHONE (OUTPATIENT)
Dept: ORTHOPEDIC SURGERY | Facility: CLINIC | Age: 58
End: 2018-05-15

## 2018-05-15 NOTE — TELEPHONE ENCOUNTER
PATIENT'S FMLA COMPANY CALLED AND STATED THERE IS A GAP IN HIS COVERAGE FOR THE DATES OF 05/01/18-05/21/18, THEY WILL NEED TO BE REFILLED OUT AND SENT BACK WITH UPDATED INFORMATION. PLEASE CONTACT [employer] WITH QUESTIONS

## 2018-05-15 NOTE — TELEPHONE ENCOUNTER
PATIENT WAS NOT PUT OFF WORK BY [employer] OR DR WOOTEN PRIOR TO SURGERY, THEREFORE FMLA WILL START AS OF SURGERY DATE ON 5/22/18.  I HAVE TALKED TO PATIENT AND ADVISED HIM THAT THERE IS NO DOCUMENTATION STATING FOR HIM TO BE OFF WORK PRIOR TO SURGERY DATE.

## 2018-05-21 ENCOUNTER — OFFICE VISIT (OUTPATIENT)
Dept: ORTHOPEDIC SURGERY | Facility: CLINIC | Age: 58
End: 2018-05-21

## 2018-05-21 VITALS
SYSTOLIC BLOOD PRESSURE: 157 MMHG | HEART RATE: 86 BPM | BODY MASS INDEX: 45.1 KG/M2 | WEIGHT: 315 LBS | HEIGHT: 70 IN | DIASTOLIC BLOOD PRESSURE: 77 MMHG

## 2018-05-21 DIAGNOSIS — M75.121 COMPLETE TEAR OF RIGHT ROTATOR CUFF: Primary | ICD-10-CM

## 2018-05-21 PROCEDURE — 99024 POSTOP FOLLOW-UP VISIT: CPT | Performed by: ORTHOPAEDIC SURGERY

## 2018-05-21 NOTE — PROGRESS NOTES
[facility] [place]    Subjective     Chief Complaint   Patient presents with   • Right Shoulder - Follow-up     1 week        HPI      Joselito Chowdary III is a 58 y.o. male.  He is here to discuss surgery tomorrow there was some questions about he wanted to have a pain pump catheter.        Past Medical History:   Diagnosis Date   • Diabetes mellitus    • Hypertension    • Respiratory infection       Past Surgical History:   Procedure Laterality Date   • CARDIAC CATHETERIZATION     • CHOLECYSTECTOMY     • GALLBLADDER SURGERY        Family History   Problem Relation Age of Onset   • Cancer Mother    • Hypertension Mother    • Diabetes Father    • Heart disease Father    • Hypertension Father    • COPD Father      Social History     Social History   • Marital status:      Spouse name: N/A   • Number of children: N/A   • Years of education: N/A     Occupational History   • Not on file.     Social History Main Topics   • Smoking status: Former Smoker     Packs/day: 1.00     Years: 8.00     Types: Cigarettes     Start date: 1978     Quit date: 1986   • Smokeless tobacco: Never Used   • Alcohol use No   • Drug use: No   • Sexual activity: Defer     Other Topics Concern   • Not on file     Social History Narrative   • No narrative on file      Current Outpatient Prescriptions on File Prior to Visit   Medication Sig Dispense Refill   • ARIPiprazole (ABILIFY) 5 MG tablet Take 5 mg by mouth Daily.     • aspirin 81 MG EC tablet Take 81 mg by mouth Daily.     • B-D UF III MINI PEN NEEDLES 31G X 5 MM misc      • buPROPion XL (WELLBUTRIN XL) 300 MG 24 hr tablet      • FARXIGA 10 MG tablet Take 1 tablet by mouth Daily. 200001 8   • HYDROcod Polst-CPM Polst ER (TUSSIONEX PENNKINETIC) 10-8 MG/5ML ER suspension      • HYDROcodone-acetaminophen (NORCO) 7.5-325 MG per tablet Take 1 tablet by mouth Every 6 (Six) Hours As Needed for Moderate Pain . 12 tablet 0   • lamoTRIgine (LaMICtal) 200 MG tablet      •  "losartan-hydrochlorothiazide (HYZAAR) 100-25 MG per tablet Take 1 tablet by mouth Daily. 200001 5   • metFORMIN (GLUCOPHAGE) 500 MG tablet      • NOVOLOG FLEXPEN 100 UNIT/ML solution pen-injector sc pen      • ONETOUCH VERIO test strip      • potassium chloride (K-DUR) 10 MEQ CR tablet      • PROAIR  (90 BASE) MCG/ACT inhaler      • REXULTI 2 MG tablet      • simvastatin (ZOCOR) 20 MG tablet      • TRESIBA FLEXTOUCH 100 UNIT/ML solution pen-injector injection inject 100 units sub-q at bedtime  8   • VICTOZA 18 MG/3ML solution pen-injector        No current facility-administered medications on file prior to visit.       Allergies   Allergen Reactions   • Zestoretic [Lisinopril-Hydrochlorothiazide] Cough        The following portions of the patient's history were reviewed and updated as appropriate: allergies, current medications, past family history, past medical history, past social history, past surgical history and problem list.    Review of Systems   Constitutional: Negative.    HENT: Negative.    Eyes: Negative.    Respiratory: Negative.    Cardiovascular: Negative.    Gastrointestinal: Negative.    Endocrine: Negative.    Genitourinary: Negative.    Musculoskeletal: Positive for arthralgias.   Skin: Negative.    Allergic/Immunologic: Negative.    Neurological: Negative.    Hematological: Negative.    Psychiatric/Behavioral: Negative.         Objective      Physical Exam  /77   Pulse 86   Ht 177.8 cm (70\")   Wt (!) 143 kg (315 lb 7.7 oz)   BMI 45.27 kg/m²     Body mass index is 45.27 kg/m².        GENERAL APPEARANCE: awake, alert & oriented x 3, in no acute distress and well developed, well nourished  PSYCH: normal mood and affect      Ortho Exam  Musculoskeletal   Upper Extremity   Right Shoulder     Inspection and Palpation:     Tenderness - none    Crepitus - none    Sensation is normal    Examination reveals no ecchymosis.      Strength and Tone:    Supraspinatus,  Infraspinatus - " 4/5    Subscapularis - 5/5    Deltoid - 5/5     Range of Motion   Left shoulder:    Internal Rotation: ROM - T7    External Rotation: AROM - 80 degrees    Elevation through flexion: AROM - 180 degrees    Right Shoulder:    Internal Rotation: ROM - T7    External Rotation: AROM - 80 degrees    Elevation through flexion: AROM - 80 degrees     Abduction -80 degrees     Instability   Right shoulder    Sulcus sign negative    Apprehension test negative    Roberta relocation test negative    Jerk test negative   Impingement   Right shoulder    Araya impingement test positive    Neer impingement test positive   Functional Testing   Right shoulder    AC crossover adduction test negative    Abdominal compression test negative    Lift-off sign negative    Speed's test negative    Eden's test negative    Horriblower's sign negative         Assessment/Plan        ICD-10-CM ICD-9-CM   1. Complete tear of right rotator cuff M75.121 727.61     He was here to discuss the pain pump catheter and anesthesia.  I discussed the pros and cons.  I prefer the surgeries to be done at the surgery center as long as is healthy to do it there.  All of his questions were answered.  We anticipate surgery tomorrow in the surgery Center under a single shot interscalene block with appropriate pain medicine prescription.  We discussed he is at increased risk because of his body mass index is 45.3.  But the reality is that could not be changed prior to his surgery.  The longer he goes with a massive tear more likely it'll be to fail with any type of repair    Medical Decision Making  Management Options : over-the-counter medicine and major surgery with risk factors      Isael Ortiz MD  05/21/18  10:55 AM         EMR Dragon/Transcription disclaimer:  Much of this encounter note is an electronic transcription of spoken language to printed text. Electronic transcription of spoken language may permit erroneous, or at times, nonsensical words or  phrases to be inadvertently transcribed. Although I have reviewed the note for such errors, some may still exist.

## 2018-05-22 ENCOUNTER — OUTSIDE FACILITY SERVICE (OUTPATIENT)
Dept: ORTHOPEDIC SURGERY | Facility: CLINIC | Age: 58
End: 2018-05-22

## 2018-05-22 ENCOUNTER — LAB REQUISITION (OUTPATIENT)
Dept: LAB | Facility: HOSPITAL | Age: 58
End: 2018-05-22

## 2018-05-22 DIAGNOSIS — M75.121 COMPLETE TEAR OF RIGHT ROTATOR CUFF: ICD-10-CM

## 2018-05-22 LAB — POTASSIUM BLDA-SCNC: 4.09 MMOL/L (ref 3.5–5.3)

## 2018-05-22 PROCEDURE — 84132 ASSAY OF SERUM POTASSIUM: CPT | Performed by: ORTHOPAEDIC SURGERY

## 2018-05-22 PROCEDURE — 29827 SHO ARTHRS SRG RT8TR CUF RPR: CPT | Performed by: ORTHOPAEDIC SURGERY

## 2018-05-30 ENCOUNTER — OFFICE VISIT (OUTPATIENT)
Dept: ORTHOPEDIC SURGERY | Facility: CLINIC | Age: 58
End: 2018-05-30

## 2018-05-30 DIAGNOSIS — Z98.890 STATUS POST RIGHT ROTATOR CUFF REPAIR: Primary | ICD-10-CM

## 2018-05-30 PROCEDURE — 99024 POSTOP FOLLOW-UP VISIT: CPT | Performed by: ORTHOPAEDIC SURGERY

## 2018-06-20 ENCOUNTER — OFFICE VISIT (OUTPATIENT)
Dept: ORTHOPEDIC SURGERY | Facility: CLINIC | Age: 58
End: 2018-06-20

## 2018-06-20 DIAGNOSIS — Z98.890 STATUS POST RIGHT ROTATOR CUFF REPAIR: Primary | ICD-10-CM

## 2018-06-20 PROCEDURE — 99024 POSTOP FOLLOW-UP VISIT: CPT | Performed by: ORTHOPAEDIC SURGERY

## 2018-06-20 NOTE — PROGRESS NOTES
Chief Complaint   Patient presents with   • Post-op     4 weeks status post (R) shoulder arthroscopic RCR, debridement of labrum 05/22/2018           HPI  He is follow-up right shoulder rotator cuff repair May 22 doing well.  He has no complaints.      There were no vitals filed for this visit.      Physical Exam:  Portals look good he is neurovascular intact.        ICD-10-CM ICD-9-CM   1. Status post right rotator cuff repair Z98.890 V45.89       Orders Placed This Encounter   Procedures   • Ambulatory Referral to Physical Therapy     He will start physical therapy for range of motion and follow-up in one month.

## 2018-06-29 ENCOUNTER — TELEPHONE (OUTPATIENT)
Dept: ORTHOPEDIC SURGERY | Facility: CLINIC | Age: 58
End: 2018-06-29

## 2018-07-06 ENCOUNTER — TELEPHONE (OUTPATIENT)
Dept: ORTHOPEDIC SURGERY | Facility: CLINIC | Age: 58
End: 2018-07-06

## 2018-07-06 NOTE — TELEPHONE ENCOUNTER
CALLED PATIENT TO INFORM FMLA PAPERWORK IS COMPLETED. NEED TO KNOW IF WANT FAXED AND NUMBER TO FAX TO OR IF HE WILL  FROM OFFICE.

## 2018-07-18 ENCOUNTER — OFFICE VISIT (OUTPATIENT)
Dept: ORTHOPEDIC SURGERY | Facility: CLINIC | Age: 58
End: 2018-07-18

## 2018-07-18 DIAGNOSIS — Z98.890 STATUS POST RIGHT ROTATOR CUFF REPAIR: Primary | ICD-10-CM

## 2018-07-18 PROCEDURE — 99024 POSTOP FOLLOW-UP VISIT: CPT | Performed by: ORTHOPAEDIC SURGERY

## 2018-07-18 NOTE — PROGRESS NOTES
Chief Complaint   Patient presents with   • Post-op Follow-up     8  weeks status post (R) shoulder arthroscopic RCR, debridement of labrum 05/22/2018           HPI he is follow-up right shoulder cuff repair doing well.    There were no vitals filed for this visit.      Physical Exam:  Peripheral Vascular   Right Upper Extremity    No cyanotic nail beds    Pink nail beds and rapid capillary refill   Palpation    Radial Pulse - Bilaterally normal    Musculoskeletal   Upper Extremity   Right Shoulder    Inspection and palpation:    Tenderness - mild    Swelling - none    Sensation - normal   ROJM:   Right:   External Rotation: PROM - 30 degrees   Elevation through flexion: PROM - 100 degrees              ICD-10-CM ICD-9-CM   1. Status post right rotator cuff repair Z98.890 V45.89       Orders Placed This Encounter   Procedures   • Ambulatory Referral to Physical Therapy     He will go to physical therapy and follow-up in one month.  He will continue work on.  His work restriction is no lifting right arm.

## 2018-08-22 ENCOUNTER — OFFICE VISIT (OUTPATIENT)
Dept: ORTHOPEDIC SURGERY | Facility: CLINIC | Age: 58
End: 2018-08-22

## 2018-08-22 DIAGNOSIS — Z98.890 STATUS POST RIGHT ROTATOR CUFF REPAIR: Primary | ICD-10-CM

## 2018-08-22 PROCEDURE — 99024 POSTOP FOLLOW-UP VISIT: CPT | Performed by: ORTHOPAEDIC SURGERY

## 2018-08-22 RX ORDER — BREXPIPRAZOLE 3 MG/1
1 TABLET ORAL DAILY
Refills: 1 | COMMUNITY
Start: 2018-07-24 | End: 2021-07-12

## 2018-08-22 NOTE — PROGRESS NOTES
Chief Complaint   Patient presents with   • Right Shoulder - Follow-up     5 weeks- 13 weeks status post (R) shoulder arthroscopic RCR, debridement of labrum 05/22/2018           HPI  He feels better.  He goes to [facility] physical therapy in [place].      There were no vitals filed for this visit.      Physical Exam:    He only gets 140° of forward flexion abduction compared to 180 on the left.  He is neurovascular intact.      ICD-10-CM ICD-9-CM   1. Status post right rotator cuff repair Z98.890 V45.89       Orders Placed This Encounter   Procedures   • Ambulatory Referral to Physical Therapy     Continue physical therapy.  He is allowed to be full duty.  He'll follow-up in one month.

## 2018-09-17 ENCOUNTER — OFFICE VISIT (OUTPATIENT)
Dept: SLEEP MEDICINE | Facility: HOSPITAL | Age: 58
End: 2018-09-17

## 2018-09-17 VITALS
DIASTOLIC BLOOD PRESSURE: 75 MMHG | SYSTOLIC BLOOD PRESSURE: 150 MMHG | WEIGHT: 315 LBS | OXYGEN SATURATION: 94 % | BODY MASS INDEX: 45.1 KG/M2 | HEIGHT: 70 IN | HEART RATE: 96 BPM

## 2018-09-17 DIAGNOSIS — G47.33 OSA (OBSTRUCTIVE SLEEP APNEA): Primary | ICD-10-CM

## 2018-09-17 PROCEDURE — 99213 OFFICE O/P EST LOW 20 MIN: CPT | Performed by: NURSE PRACTITIONER

## 2018-09-17 NOTE — PATIENT INSTRUCTIONS
Obesity, Adult  Obesity is the condition of having too much total body fat. Being overweight or obese means that your weight is greater than what is considered healthy for your body size. Obesity is determined by a measurement called BMI. BMI is an estimate of body fat and is calculated from height and weight. For adults, a BMI of 30 or higher is considered obese.  Obesity can eventually lead to other health concerns and major illnesses, including:  · Stroke.  · Coronary artery disease (CAD).  · Type 2 diabetes.  · Some types of cancer, including cancers of the colon, breast, uterus, and gallbladder.  · Osteoarthritis.  · High blood pressure (hypertension).  · High cholesterol.  · Sleep apnea.  · Gallbladder stones.  · Infertility problems.    What are the causes?  The main cause of obesity is taking in (consuming) more calories than your body uses for energy. Other factors that contribute to this condition may include:  · Being born with genes that make you more likely to become obese.  · Having a medical condition that causes obesity. These conditions include:  ? Hypothyroidism.  ? Polycystic ovarian syndrome (PCOS).  ? Binge-eating disorder.  ? Cushing syndrome.  · Taking certain medicines, such as steroids, antidepressants, and seizure medicines.  · Not being physically active (sedentary lifestyle).  · Living where there are limited places to exercise safely or buy healthy foods.  · Not getting enough sleep.    What increases the risk?  The following factors may increase your risk of this condition:  · Having a family history of obesity.  · Being a woman of -American descent.  · Being a man of  descent.    What are the signs or symptoms?  Having excessive body fat is the main symptom of this condition.  How is this diagnosed?  This condition may be diagnosed based on:  · Your symptoms.  · Your medical history.  · A physical exam. Your health care provider may measure:  ? Your BMI. If you are an  adult with a BMI between 25 and less than 30, you are considered overweight. If you are an adult with a BMI of 30 or higher, you are considered obese.  ? The distances around your hips and your waist (circumferences). These may be compared to each other to help diagnose your condition.  ? Your skinfold thickness. Your health care provider may gently pinch a fold of your skin and measure it.    How is this treated?  Treatment for this condition often includes changing your lifestyle. Treatment may include some or all of the following:  · Dietary changes. Work with your health care provider and a dietitian to set a weight-loss goal that is healthy and reasonable for you. Dietary changes may include eating:  ? Smaller portions. A portion size is the amount of a particular food that is healthy for you to eat at one time. This varies from person to person.  ? Low-calorie or low-fat options.  ? More whole grains, fruits, and vegetables.  · Regular physical activity. This may include aerobic activity (cardio) and strength training.  · Medicine to help you lose weight. Your health care provider may prescribe medicine if you are unable to lose 1 pound a week after 6 weeks of eating more healthily and doing more physical activity.  · Surgery. Surgical options may include gastric banding and gastric bypass. Surgery may be done if:  ? Other treatments have not helped to improve your condition.  ? You have a BMI of 40 or higher.  ? You have life-threatening health problems related to obesity.    Follow these instructions at home:    Eating and drinking    · Follow recommendations from your health care provider about what you eat and drink. Your health care provider may advise you to:  ? Limit fast foods, sweets, and processed snack foods.  ? Choose low-fat options, such as low-fat milk instead of whole milk.  ? Eat 5 or more servings of fruits or vegetables every day.  ? Eat at home more often. This gives you more control over  what you eat.  ? Choose healthy foods when you eat out.  ? Learn what a healthy portion size is.  ? Keep low-fat snacks on hand.  ? Avoid sugary drinks, such as soda, fruit juice, iced tea sweetened with sugar, and flavored milk.  ? Eat a healthy breakfast.  · Drink enough water to keep your urine clear or pale yellow.  · Do not go without eating for long periods of time (do not fast) or follow a fad diet. Fasting and fad diets can be unhealthy and even dangerous.  Physical Activity  · Exercise regularly, as told by your health care provider. Ask your health care provider what types of exercise are safe for you and how often you should exercise.  · Warm up and stretch before being active.  · Cool down and stretch after being active.  · Rest between periods of activity.  Lifestyle  · Limit the time that you spend in front of your TV, computer, or video game system.  · Find ways to reward yourself that do not involve food.  · Limit alcohol intake to no more than 1 drink a day for nonpregnant women and 2 drinks a day for men. One drink equals 12 oz of beer, 5 oz of wine, or 1½ oz of hard liquor.  General instructions  · Keep a weight loss journal to keep track of the food you eat and how much you exercise you get.  · Take over-the-counter and prescription medicines only as told by your health care provider.  · Take vitamins and supplements only as told by your health care provider.  · Consider joining a support group. Your health care provider may be able to recommend a support group.  · Keep all follow-up visits as told by your health care provider. This is important.  Contact a health care provider if:  · You are unable to meet your weight loss goal after 6 weeks of dietary and lifestyle changes.  This information is not intended to replace advice given to you by your health care provider. Make sure you discuss any questions you have with your health care provider.  Document Released: 01/25/2006 Document Revised:  05/22/2017 Document Reviewed: 10/05/2016  [employer] Interactive Patient Education © 2018 [employer]  Sleep Apnea  Sleep apnea is a condition that affects breathing. People with sleep apnea have moments during sleep when their breathing pauses briefly or gets shallow. Sleep apnea can cause these symptoms:  · Trouble staying asleep.  · Sleepiness or tiredness during the day.  · Irritability.  · Loud snoring.  · Morning headaches.  · Trouble concentrating.  · Forgetting things.  · Less interest in sex.  · Being sleepy for no reason.  · Mood swings.  · Personality changes.  · Depression.  · Waking up a lot during the night to pee (urinate).  · Dry mouth.  · Sore throat.    Follow these instructions at home:  · Make any changes in your routine that your doctor recommends.  · Eat a healthy, well-balanced diet.  · Take over-the-counter and prescription medicines only as told by your doctor.  · Avoid using alcohol, calming medicines (sedatives), and narcotic medicines.  · Take steps to lose weight if you are overweight.  · If you were given a machine (device) to use while you sleep, use it only as told by your doctor.  · Do not use any tobacco products, such as cigarettes, chewing tobacco, and e-cigarettes. If you need help quitting, ask your doctor.  · Keep all follow-up visits as told by your doctor. This is important.  Contact a doctor if:  · The machine that you were given to use during sleep is uncomfortable or does not seem to be working.  · Your symptoms do not get better.  · Your symptoms get worse.  Get help right away if:  · Your chest hurts.  · You have trouble breathing in enough air (shortness of breath).  · You have an uncomfortable feeling in your back, arms, or stomach.  · You have trouble talking.  · One side of your body feels weak.  · A part of your face is hanging down (drooping).  These symptoms may be an emergency. Do not wait to see if the symptoms will go away. Get medical help right away. Call  your local emergency services (911 in the [place] Do not drive yourself to the hospital.  This information is not intended to replace advice given to you by your health care provider. Make sure you discuss any questions you have with your health care provider.  Document Released: 09/26/2009 Document Revised: 08/13/2017 Document Reviewed: 09/26/2016  [employer] Interactive Patient Education © 2018 [employer]

## 2018-09-17 NOTE — PROGRESS NOTES
Subjective: Follow-up        Chief Complaint:   Chief Complaint   Patient presents with   • Follow-up       HPI:    Joselito Chowdary III is a 58 y.o. male here for follow-up of destinee patient originally presented for consult on 12/7/17 he then continued with the titration study on 12/28/17 due to increased sleepiness.  He was seen in 320 of 18 and continued with daytime sleepiness.  He has increased his sleep time 7 hours nightly.  An [place] is down to 9/24.  On a 0-10 scale patient states he is an 8/10,10 feeling much better after starting sleep therapy.  He is still a little sleepy but as above is feeling better for the most part.  He does not nap or snore.  And feels his current settings very comfortable for him    Current medications are:   Current Outpatient Prescriptions:   •  albuterol (PROAIR RESPICLICK) 108 (90 Base) MCG/ACT inhaler, ProAir HFA 90 mcg/actuation aerosol inhaler  Every six hours, Disp: , Rfl:   •  aspirin 81 MG EC tablet, Take 81 mg by mouth Daily., Disp: , Rfl:   •  B-D UF III MINI PEN NEEDLES 31G X 5 MM misc, , Disp: , Rfl:   •  buPROPion XL (WELLBUTRIN XL) 300 MG 24 hr tablet, , Disp: , Rfl:   •  FARXIGA 10 MG tablet, Take 1 tablet by mouth Daily. 200001, Disp: , Rfl: 8  •  HYDROcod Polst-CPM Polst ER (TUSSIONEX PENNKINETIC) 10-8 MG/5ML ER suspension, , Disp: , Rfl:   •  lamoTRIgine (LaMICtal) 200 MG tablet, , Disp: , Rfl:   •  losartan-hydrochlorothiazide (HYZAAR) 100-25 MG per tablet, Take 1 tablet by mouth Daily. 200001, Disp: , Rfl: 5  •  metFORMIN (GLUCOPHAGE) 500 MG tablet, , Disp: , Rfl:   •  NOVOLOG FLEXPEN 100 UNIT/ML solution pen-injector sc pen, , Disp: , Rfl:   •  ONETOUCH VERIO test strip, , Disp: , Rfl:   •  potassium chloride (K-DUR) 10 MEQ CR tablet, , Disp: , Rfl:   •  PROAIR  (90 BASE) MCG/ACT inhaler, , Disp: , Rfl:   •  REXULTI 2 MG tablet, , Disp: , Rfl:   •  REXULTI 3 MG tablet, Take 1 tablet by mouth Daily. 200001, Disp: , Rfl: 1  •  simvastatin (ZOCOR) 20 MG  tablet, , Disp: , Rfl:   •  TRESIBA FLEXTOUCH 100 UNIT/ML solution pen-injector injection, inject 100 units sub-q at bedtime, Disp: , Rfl: 8  •  VICTOZA 18 MG/3ML solution pen-injector, , Disp: , Rfl: .      The patient's relevant past medical, surgical, family and social history were reviewed and updated in Epic as appropriate.       Review of Systems   Constitutional: Positive for fatigue.   HENT: Positive for postnasal drip.    Eyes: Positive for visual disturbance.   Respiratory: Positive for apnea and cough.    Endocrine: Positive for polydipsia and polyuria.   Allergic/Immunologic: Positive for environmental allergies.   Psychiatric/Behavioral: Positive for dysphoric mood and sleep disturbance.   All other systems reviewed and are negative.        Objective:    Physical Exam   Constitutional: He is oriented to person, place, and time. He appears well-developed and well-nourished.   HENT:   Head: Normocephalic and atraumatic.   Mouth/Throat: Oropharynx is clear and moist.   Mallampati 3 anatomy   Eyes: Conjunctivae are normal.   Neck: Neck supple. No thyromegaly present.   Cardiovascular: Normal rate and regular rhythm.    Pulmonary/Chest: Effort normal and breath sounds normal.   Lymphadenopathy:     He has no cervical adenopathy.   Neurological: He is alert and oriented to person, place, and time.   Skin: Skin is warm and dry.   Psychiatric: He has a normal mood and affect. His behavior is normal. Judgment and thought content normal.   Nursing note and vitals reviewed.   downloading compliance has been reviewed with patient.  He is using a greater than 4 hours at 80% of the time.  AHI is corrected is 3.2.  Percent pressure at 16.0 cm H2O      ASSESSMENT/PLAN    Joselito was seen today for follow-up.    Diagnoses and all orders for this visit:    GT (obstructive sleep apnea)  -     CPAP Therapy            1. Counseled patient regarding multimodal approach with healthy nutrition, healthy sleep, regular physical  activity, social activities, counseling, and medications. Encouraged to practice lateral sleep position. Avoid alcohol and sedatives close to bedtime.  2. Encouraged importance of weight loss  3. Refill supplies ×1 year.  4. Return to clinic one year or sooner if symptoms warrant.    I have reviewed the results of my evaluation and impression and discussed my recommendations in detail with the patient.      Signed by  MELVIN Valenzuela    September 17, 2018      CC: Matty Galvez, DO          No ref. provider found

## 2018-09-24 ENCOUNTER — OFFICE VISIT (OUTPATIENT)
Dept: ORTHOPEDIC SURGERY | Facility: CLINIC | Age: 58
End: 2018-09-24

## 2018-09-24 VITALS — HEART RATE: 97 BPM | OXYGEN SATURATION: 96 % | HEIGHT: 70 IN | WEIGHT: 315 LBS | BODY MASS INDEX: 45.1 KG/M2

## 2018-09-24 DIAGNOSIS — Z98.890 STATUS POST RIGHT ROTATOR CUFF REPAIR: Primary | ICD-10-CM

## 2018-09-24 PROCEDURE — 99212 OFFICE O/P EST SF 10 MIN: CPT | Performed by: ORTHOPAEDIC SURGERY

## 2018-09-24 NOTE — PROGRESS NOTES
[facility] Note    Subjective     Chief Complaint   Patient presents with   • Follow-up     4 weeks-  4 months status post (R) shoulder arthroscopic RCR, debridement of labrum 05/22/2018        FRANCISCO Chowdary III is a 58 y.o. male.  He is about 4 months follow-up right shoulder cuff repair.  He is doing better.  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  He believes he can do that job without restriction.        Past Medical History:   Diagnosis Date   • Diabetes mellitus (CMS/HCC)    • Hypertension    • Respiratory infection       Past Surgical History:   Procedure Laterality Date   • CARDIAC CATHETERIZATION     • CHOLECYSTECTOMY     • GALLBLADDER SURGERY        Family History   Problem Relation Age of Onset   • Cancer Mother    • Hypertension Mother    • Diabetes Father    • Heart disease Father    • Hypertension Father    • COPD Father      Social History     Social History   • Marital status:      Spouse name: N/A   • Number of children: N/A   • Years of education: N/A     Occupational History   • Not on file.     Social History Main Topics   • Smoking status: Former Smoker     Packs/day: 1.00     Years: 8.00     Types: Cigarettes     Start date: 1978     Quit date: 1986   • Smokeless tobacco: Never Used   • Alcohol use No   • Drug use: No   • Sexual activity: Defer     Other Topics Concern   • Not on file     Social History Narrative   • No narrative on file      Current Outpatient Prescriptions on File Prior to Visit   Medication Sig Dispense Refill   • albuterol (PROAIR RESPICLICK) 108 (90 Base) MCG/ACT inhaler ProAir HFA 90 mcg/actuation aerosol inhaler   Every six hours     • aspirin 81 MG EC tablet Take 81 mg by mouth Daily.     • B-D UF III MINI PEN NEEDLES 31G X 5 MM misc      • buPROPion XL (WELLBUTRIN XL) 300 MG 24 hr tablet      • FARXIGA 10 MG tablet Take 1 tablet by mouth Daily. 200001 8   • HYDROcod Polst-CPM Polst ER (TUSSIONEX  "PENNKINETIC) 10-8 MG/5ML ER suspension      • lamoTRIgine (LaMICtal) 200 MG tablet      • losartan-hydrochlorothiazide (HYZAAR) 100-25 MG per tablet Take 1 tablet by mouth Daily. 200001 5   • metFORMIN (GLUCOPHAGE) 500 MG tablet      • NOVOLOG FLEXPEN 100 UNIT/ML solution pen-injector sc pen      • ONETOUCH VERIO test strip      • potassium chloride (K-DUR) 10 MEQ CR tablet      • PROAIR  (90 BASE) MCG/ACT inhaler      • REXULTI 2 MG tablet      • REXULTI 3 MG tablet Take 1 tablet by mouth Daily. 200001 1   • simvastatin (ZOCOR) 20 MG tablet      • TRESIBA FLEXTOUCH 100 UNIT/ML solution pen-injector injection inject 100 units sub-q at bedtime  8   • VICTOZA 18 MG/3ML solution pen-injector        No current facility-administered medications on file prior to visit.       Allergies   Allergen Reactions   • Zestoretic [Lisinopril-Hydrochlorothiazide] Cough        The following portions of the patient's history were reviewed and updated as appropriate: allergies, current medications, past family history, past medical history, past social history, past surgical history and problem list.    Review of Systems   Constitutional: Negative.    HENT: Negative.    Eyes: Negative.    Respiratory: Negative.    Cardiovascular: Negative.    Gastrointestinal: Negative.    Endocrine: Negative.    Genitourinary: Negative.    Musculoskeletal: Positive for arthralgias.   Skin: Negative.    Allergic/Immunologic: Negative.    Neurological: Negative.    Hematological: Negative.    Psychiatric/Behavioral: Negative.         Objective      Physical Exam  Pulse 97   Ht 177.8 cm (70\")   Wt (!) 154 kg (340 lb 9.8 oz)   SpO2 96%   BMI 48.87 kg/m²     Body mass index is 48.87 kg/m².        GENERAL APPEARANCE: awake, alert & oriented x 3, in no acute distress and well developed, well nourished  PSYCH: normal mood and affect      Ortho Exam  He gets about 150° of forward flexion and abduction compared to 180 on the left.  He is " neurovascular intact.  His strength is 4+ out of 5.    Imaging/Studies  Imaging Results (last 7 days)     ** No results found for the last 168 hours. **          Assessment/Plan        ICD-10-CM ICD-9-CM   1. Status post right rotator cuff repair Z98.890 V45.89       Orders Placed This Encounter   Procedures   • Ambulatory Referral to Physical Therapy    He will continue physical therapy.  He goes to [facility] in [place].  I will see him back in 1 month.  He may work without restrictions.    Medical Decision Making  Management Options : over-the-counter medicine and physical/occupational therapy      Isael Ortiz MD  09/24/18  11:08 AM         EMR Dragon/Transcription disclaimer:  Much of this encounter note is an electronic transcription of spoken language to printed text. Electronic transcription of spoken language may permit erroneous, or at times, nonsensical words or phrases to be inadvertently transcribed. Although I have reviewed the note for such errors, some may still exist.

## 2018-10-22 ENCOUNTER — OFFICE VISIT (OUTPATIENT)
Dept: ORTHOPEDIC SURGERY | Facility: CLINIC | Age: 58
End: 2018-10-22

## 2018-10-22 VITALS — HEART RATE: 96 BPM | WEIGHT: 315 LBS | BODY MASS INDEX: 45.1 KG/M2 | OXYGEN SATURATION: 98 % | HEIGHT: 70 IN

## 2018-10-22 DIAGNOSIS — Z98.890 STATUS POST RIGHT ROTATOR CUFF REPAIR: Primary | ICD-10-CM

## 2018-10-22 PROCEDURE — 99212 OFFICE O/P EST SF 10 MIN: CPT | Performed by: ORTHOPAEDIC SURGERY

## 2018-10-22 NOTE — PROGRESS NOTES
[facility] [place] [facility] Note    Subjective     Chief Complaint   Patient presents with   • Follow-up     1 month follow up - 5 months status post status post (R) shoulder arthroscopic RCR, debridement of labrum 05/22/2018        HPI      Joselito Chowdary III is a 58 y.o. male.  He is doing much better follow-up right shoulder cuff repair.  He feels he is getting stronger.  His pain is 1 out of 10.  He is working full duty.        Past Medical History:   Diagnosis Date   • Diabetes mellitus (CMS/HCC)    • Hypertension    • Respiratory infection       Past Surgical History:   Procedure Laterality Date   • CARDIAC CATHETERIZATION     • CHOLECYSTECTOMY     • GALLBLADDER SURGERY        Family History   Problem Relation Age of Onset   • Cancer Mother    • Hypertension Mother    • Diabetes Father    • Heart disease Father    • Hypertension Father    • COPD Father      Social History     Social History   • Marital status:      Spouse name: N/A   • Number of children: N/A   • Years of education: N/A     Occupational History   • Not on file.     Social History Main Topics   • Smoking status: Former Smoker     Packs/day: 1.00     Years: 8.00     Types: Cigarettes     Start date: 1978     Quit date: 1986   • Smokeless tobacco: Never Used   • Alcohol use No   • Drug use: No   • Sexual activity: Defer     Other Topics Concern   • Not on file     Social History Narrative   • No narrative on file      Current Outpatient Prescriptions on File Prior to Visit   Medication Sig Dispense Refill   • albuterol (PROAIR RESPICLICK) 108 (90 Base) MCG/ACT inhaler ProAir HFA 90 mcg/actuation aerosol inhaler   Every six hours     • aspirin 81 MG EC tablet Take 81 mg by mouth Daily.     • B-D UF III MINI PEN NEEDLES 31G X 5 MM misc      • buPROPion XL (WELLBUTRIN XL) 300 MG 24 hr tablet      • FARXIGA 10 MG tablet Take 1 tablet by mouth Daily. 200001 8   • HYDROcod Polst-CPM Polst ER (TUSSIONEX PENNKINETIC) 10-8 MG/5ML ER  "suspension      • lamoTRIgine (LaMICtal) 200 MG tablet      • losartan-hydrochlorothiazide (HYZAAR) 100-25 MG per tablet Take 1 tablet by mouth Daily. 200001  5   • metFORMIN (GLUCOPHAGE) 500 MG tablet      • NOVOLOG FLEXPEN 100 UNIT/ML solution pen-injector sc pen      • ONETOUCH VERIO test strip      • potassium chloride (K-DUR) 10 MEQ CR tablet      • PROAIR  (90 BASE) MCG/ACT inhaler      • REXULTI 2 MG tablet      • REXULTI 3 MG tablet Take 1 tablet by mouth Daily. 200001 1   • simvastatin (ZOCOR) 20 MG tablet      • TRESIBA FLEXTOUCH 100 UNIT/ML solution pen-injector injection inject 100 units sub-q at bedtime  8   • VICTOZA 18 MG/3ML solution pen-injector        No current facility-administered medications on file prior to visit.       Allergies   Allergen Reactions   • Zestoretic [Lisinopril-Hydrochlorothiazide] Cough        The following portions of the patient's history were reviewed and updated as appropriate: allergies, current medications, past family history, past medical history, past social history, past surgical history and problem list.    Review of Systems   Constitutional: Negative.    HENT: Negative.    Eyes: Negative.    Respiratory: Negative.    Cardiovascular: Negative.    Gastrointestinal: Negative.    Endocrine: Negative.    Genitourinary: Negative.    Musculoskeletal: Negative.         Status post arthroscopic shoulder    Skin: Negative.    Allergic/Immunologic: Negative.    Neurological: Negative.    Hematological: Negative.    Psychiatric/Behavioral: Negative.         Objective      Physical Exam  Pulse 96   Ht 177.8 cm (70\")   Wt (!) 147 kg (324 lb 1.2 oz)   SpO2 98%   BMI 46.50 kg/m²     Body mass index is 46.5 kg/m².        GENERAL APPEARANCE: awake, alert & oriented x 3, in no acute distress and well developed, well nourished  PSYCH: normal mood and affect    Ortho Exam  Musculoskeletal   Upper Extremity   Right Shoulder     Inspection and Palpation:     Tenderness - " none    Crepitus - none    Sensation is normal    Examination reveals no ecchymosis.      Strength and Tone:    Supraspinatus,  Infraspinatus - 5/5    Subscapularis - 5/5    Deltoid - 5/5     Range of Motion   Left shoulder:    Internal Rotation: ROM - T7    External Rotation: AROM - 80 degrees    Elevation through flexion: AROM - 180 degrees    Right Shoulder:    Internal Rotation: ROM - T7    External Rotation: AROM - 80 degrees    Elevation through flexion: AROM - 180 degrees     Abduction -180 degrees     Instability   Right shoulder    Sulcus sign negative    Apprehension test negative    Roberta relocation test negative    Jerk test negative   Impingement   Right shoulder    Araya impingement test positive    Neer impingement test positive   Functional Testing   Right shoulder    AC crossover adduction test negative    Abdominal compression test negative    Lift-off sign negative    Speed's test negative    Eden's test negative    Horriblower's sign negative       Imaging/Studies  Imaging Results (last 7 days)     ** No results found for the last 168 hours. **          Assessment/Plan        ICD-10-CM ICD-9-CM   1. Status post right rotator cuff repair Z98.890 V45.89     He will continue strengthening.  I will see him back as needed.  He may do a home exercise program.  He may work without restrictions  Medical Decision Making  Management Options : over-the-counter medicine      Isael Ortiz MD  10/22/18  11:00 AM         EMR Dragon/Transcription disclaimer:  Much of this encounter note is an electronic transcription of spoken language to printed text. Electronic transcription of spoken language may permit erroneous, or at times, nonsensical words or phrases to be inadvertently transcribed. Although I have reviewed the note for such errors, some may still exist.

## 2019-04-08 ENCOUNTER — TRANSCRIBE ORDERS (OUTPATIENT)
Dept: ULTRASOUND IMAGING | Facility: HOSPITAL | Age: 59
End: 2019-04-08

## 2019-04-08 DIAGNOSIS — R74.8 ABNORMAL LIVER ENZYMES: Primary | ICD-10-CM

## 2019-04-11 ENCOUNTER — APPOINTMENT (OUTPATIENT)
Dept: ULTRASOUND IMAGING | Facility: HOSPITAL | Age: 59
End: 2019-04-11

## 2019-04-19 ENCOUNTER — HOSPITAL ENCOUNTER (OUTPATIENT)
Dept: ULTRASOUND IMAGING | Facility: HOSPITAL | Age: 59
Discharge: HOME | End: 2019-04-19
Admitting: INTERNAL MEDICINE

## 2019-04-19 DIAGNOSIS — R74.8 ABNORMAL LIVER ENZYMES: ICD-10-CM

## 2019-04-19 PROCEDURE — 76705 ECHO EXAM OF ABDOMEN: CPT

## 2019-05-31 ENCOUNTER — HOSPITAL ENCOUNTER (EMERGENCY)
Facility: HOSPITAL | Age: 59
Discharge: HOME | End: 2019-05-31
Attending: STUDENT IN AN ORGANIZED HEALTH CARE EDUCATION/TRAINING PROGRAM | Admitting: STUDENT IN AN ORGANIZED HEALTH CARE EDUCATION/TRAINING PROGRAM

## 2019-05-31 ENCOUNTER — APPOINTMENT (OUTPATIENT)
Dept: CT IMAGING | Facility: HOSPITAL | Age: 59
End: 2019-05-31

## 2019-05-31 VITALS
BODY MASS INDEX: 44.01 KG/M2 | RESPIRATION RATE: 16 BRPM | SYSTOLIC BLOOD PRESSURE: 123 MMHG | HEIGHT: 70 IN | WEIGHT: 307.4 LBS | DIASTOLIC BLOOD PRESSURE: 95 MMHG | TEMPERATURE: 98.4 F | HEART RATE: 95 BPM | OXYGEN SATURATION: 96 %

## 2019-05-31 DIAGNOSIS — K52.9 COLITIS: Primary | ICD-10-CM

## 2019-05-31 LAB
ALBUMIN SERPL-MCNC: 4.3 G/DL (ref 3.5–5)
ALBUMIN/GLOB SERPL: 1.4 G/DL (ref 1–2)
ALP SERPL-CCNC: 74 U/L (ref 38–126)
ALT SERPL W P-5'-P-CCNC: 70 U/L (ref 13–69)
ANION GAP SERPL CALCULATED.3IONS-SCNC: 15.5 MMOL/L (ref 10–20)
AST SERPL-CCNC: 48 U/L (ref 15–46)
BASOPHILS # BLD AUTO: 0.07 10*3/MM3 (ref 0–0.2)
BASOPHILS NFR BLD AUTO: 0.9 % (ref 0–1.5)
BILIRUB SERPL-MCNC: 1.1 MG/DL (ref 0.2–1.3)
BILIRUB UR QL STRIP: NEGATIVE
BUN BLD-MCNC: 18 MG/DL (ref 7–20)
BUN/CREAT SERPL: 18 (ref 6.3–21.9)
CALCIUM SPEC-SCNC: 8.9 MG/DL (ref 8.4–10.2)
CHLORIDE SERPL-SCNC: 97 MMOL/L (ref 98–107)
CLARITY UR: CLEAR
CO2 SERPL-SCNC: 27 MMOL/L (ref 26–30)
COLOR UR: YELLOW
CREAT BLD-MCNC: 1 MG/DL (ref 0.6–1.3)
DEPRECATED RDW RBC AUTO: 41.5 FL (ref 37–54)
EOSINOPHIL # BLD AUTO: 0.18 10*3/MM3 (ref 0–0.4)
EOSINOPHIL NFR BLD AUTO: 2.4 % (ref 0.3–6.2)
ERYTHROCYTE [DISTWIDTH] IN BLOOD BY AUTOMATED COUNT: 13.2 % (ref 12.3–15.4)
GFR SERPL CREATININE-BSD FRML MDRD: 77 ML/MIN/1.73
GLOBULIN UR ELPH-MCNC: 3 GM/DL
GLUCOSE BLD-MCNC: 143 MG/DL (ref 74–98)
GLUCOSE UR STRIP-MCNC: ABNORMAL MG/DL
HCT VFR BLD AUTO: 50 % (ref 37.5–51)
HGB BLD-MCNC: 17.2 G/DL (ref 13–17.7)
HGB UR QL STRIP.AUTO: NEGATIVE
IMM GRANULOCYTES # BLD AUTO: 0.04 10*3/MM3 (ref 0–0.05)
IMM GRANULOCYTES NFR BLD AUTO: 0.5 % (ref 0–0.5)
KETONES UR QL STRIP: ABNORMAL
LEUKOCYTE ESTERASE UR QL STRIP.AUTO: NEGATIVE
LIPASE SERPL-CCNC: 65 U/L (ref 23–300)
LYMPHOCYTES # BLD AUTO: 1.84 10*3/MM3 (ref 0.7–3.1)
LYMPHOCYTES NFR BLD AUTO: 24.2 % (ref 19.6–45.3)
MCH RBC QN AUTO: 29.7 PG (ref 26.6–33)
MCHC RBC AUTO-ENTMCNC: 34.4 G/DL (ref 31.5–35.7)
MCV RBC AUTO: 86.4 FL (ref 79–97)
MONOCYTES # BLD AUTO: 0.64 10*3/MM3 (ref 0.1–0.9)
MONOCYTES NFR BLD AUTO: 8.4 % (ref 5–12)
NEUTROPHILS # BLD AUTO: 4.82 10*3/MM3 (ref 1.7–7)
NEUTROPHILS NFR BLD AUTO: 63.6 % (ref 42.7–76)
NITRITE UR QL STRIP: NEGATIVE
NRBC BLD AUTO-RTO: 0 /100 WBC (ref 0–0.2)
PH UR STRIP.AUTO: 5.5 [PH] (ref 5–8)
PLATELET # BLD AUTO: 278 10*3/MM3 (ref 140–450)
PMV BLD AUTO: 10 FL (ref 6–12)
POTASSIUM BLD-SCNC: 3.5 MMOL/L (ref 3.5–5.1)
PROT SERPL-MCNC: 7.3 G/DL (ref 6.3–8.2)
PROT UR QL STRIP: NEGATIVE
RBC # BLD AUTO: 5.79 10*6/MM3 (ref 4.14–5.8)
SODIUM BLD-SCNC: 136 MMOL/L (ref 137–145)
SP GR UR STRIP: >=1.03 (ref 1–1.03)
UROBILINOGEN UR QL STRIP: ABNORMAL
WBC NRBC COR # BLD: 7.59 10*3/MM3 (ref 3.4–10.8)

## 2019-05-31 PROCEDURE — 85025 COMPLETE CBC W/AUTO DIFF WBC: CPT | Performed by: PHYSICIAN ASSISTANT

## 2019-05-31 PROCEDURE — 99283 EMERGENCY DEPT VISIT LOW MDM: CPT

## 2019-05-31 PROCEDURE — 25010000002 IOPAMIDOL 61 % SOLUTION: Performed by: STUDENT IN AN ORGANIZED HEALTH CARE EDUCATION/TRAINING PROGRAM

## 2019-05-31 PROCEDURE — 81003 URINALYSIS AUTO W/O SCOPE: CPT | Performed by: PHYSICIAN ASSISTANT

## 2019-05-31 PROCEDURE — 80053 COMPREHEN METABOLIC PANEL: CPT | Performed by: PHYSICIAN ASSISTANT

## 2019-05-31 PROCEDURE — 83690 ASSAY OF LIPASE: CPT | Performed by: PHYSICIAN ASSISTANT

## 2019-05-31 PROCEDURE — 74177 CT ABD & PELVIS W/CONTRAST: CPT

## 2019-05-31 RX ORDER — METRONIDAZOLE 500 MG/1
500 TABLET ORAL 3 TIMES DAILY
Qty: 30 TABLET | Refills: 0 | Status: SHIPPED | OUTPATIENT
Start: 2019-05-31 | End: 2019-06-10

## 2019-05-31 RX ORDER — CIPROFLOXACIN 500 MG/1
500 TABLET, FILM COATED ORAL EVERY 12 HOURS
Qty: 20 TABLET | Refills: 0 | Status: SHIPPED | OUTPATIENT
Start: 2019-05-31 | End: 2019-06-10

## 2019-05-31 RX ORDER — SODIUM CHLORIDE 0.9 % (FLUSH) 0.9 %
10 SYRINGE (ML) INJECTION AS NEEDED
Status: DISCONTINUED | OUTPATIENT
Start: 2019-05-31 | End: 2019-05-31 | Stop reason: HOSPADM

## 2019-05-31 RX ADMIN — IOPAMIDOL 100 ML: 612 INJECTION, SOLUTION INTRAVENOUS at 14:24

## 2021-01-11 ENCOUNTER — IMMUNIZATION (OUTPATIENT)
Dept: VACCINE CLINIC | Facility: HOSPITAL | Age: 61
End: 2021-01-11

## 2021-01-11 PROCEDURE — 0001A: CPT | Performed by: INTERNAL MEDICINE

## 2021-01-11 PROCEDURE — 91300 HC SARSCOV02 VAC 30MCG/0.3ML IM: CPT | Performed by: INTERNAL MEDICINE

## 2021-02-01 ENCOUNTER — IMMUNIZATION (OUTPATIENT)
Dept: VACCINE CLINIC | Facility: HOSPITAL | Age: 61
End: 2021-02-01

## 2021-02-01 PROCEDURE — 0002A: CPT | Performed by: INTERNAL MEDICINE

## 2021-02-01 PROCEDURE — 91300 HC SARSCOV02 VAC 30MCG/0.3ML IM: CPT | Performed by: INTERNAL MEDICINE

## 2021-07-12 ENCOUNTER — HOSPITAL ENCOUNTER (OUTPATIENT)
Facility: HOSPITAL | Age: 61
Discharge: HOME | End: 2021-07-14
Attending: EMERGENCY MEDICINE | Admitting: INTERNAL MEDICINE

## 2021-07-12 ENCOUNTER — APPOINTMENT (OUTPATIENT)
Dept: GENERAL RADIOLOGY | Facility: HOSPITAL | Age: 61
End: 2021-07-12

## 2021-07-12 DIAGNOSIS — E78.5 HYPERLIPIDEMIA, UNSPECIFIED HYPERLIPIDEMIA TYPE: ICD-10-CM

## 2021-07-12 DIAGNOSIS — I20.0 CRESCENDO ANGINA (HCC): ICD-10-CM

## 2021-07-12 DIAGNOSIS — I20.0 UNSTABLE ANGINA (HCC): Primary | ICD-10-CM

## 2021-07-12 DIAGNOSIS — I10 ESSENTIAL HYPERTENSION: ICD-10-CM

## 2021-07-12 PROBLEM — R06.09 EXERTIONAL DYSPNEA: Status: ACTIVE | Noted: 2021-07-12

## 2021-07-12 PROBLEM — R05.8 DRY COUGH: Status: ACTIVE | Noted: 2021-07-12

## 2021-07-12 PROBLEM — R60.0 EDEMA OF BOTH LOWER EXTREMITIES: Status: ACTIVE | Noted: 2021-07-12

## 2021-07-12 PROBLEM — R07.9 CHEST PAIN: Status: ACTIVE | Noted: 2021-07-12

## 2021-07-12 PROBLEM — F41.9 ANXIETY: Status: ACTIVE | Noted: 2021-07-12

## 2021-07-12 PROBLEM — F31.9 BIPOLAR 1 DISORDER: Status: ACTIVE | Noted: 2021-07-12

## 2021-07-12 LAB
ALBUMIN SERPL-MCNC: 3.7 G/DL (ref 3.5–5.2)
ALBUMIN/GLOB SERPL: 1.6 G/DL
ALP SERPL-CCNC: 73 U/L (ref 39–117)
ALT SERPL W P-5'-P-CCNC: 29 U/L (ref 1–41)
ANION GAP SERPL CALCULATED.3IONS-SCNC: 10 MMOL/L (ref 5–15)
ANION GAP SERPL CALCULATED.3IONS-SCNC: 11 MMOL/L (ref 5–15)
APTT PPP: 27.5 SECONDS (ref 50–95)
AST SERPL-CCNC: 22 U/L (ref 1–40)
BASOPHILS # BLD AUTO: 0.05 10*3/MM3 (ref 0–0.2)
BASOPHILS NFR BLD AUTO: 0.6 % (ref 0–1.5)
BILIRUB SERPL-MCNC: 0.4 MG/DL (ref 0–1.2)
BUN SERPL-MCNC: 12 MG/DL (ref 6–20)
BUN SERPL-MCNC: 13 MG/DL (ref 6–20)
BUN/CREAT SERPL: 12.6 (ref 7–25)
BUN/CREAT SERPL: 13.3 (ref 7–25)
CALCIUM SPEC-SCNC: 8.5 MG/DL (ref 8.6–10.5)
CALCIUM SPEC-SCNC: 8.8 MG/DL (ref 8.6–10.5)
CHLORIDE SERPL-SCNC: 101 MMOL/L (ref 98–107)
CHLORIDE SERPL-SCNC: 107 MMOL/L (ref 98–107)
CO2 SERPL-SCNC: 25 MMOL/L (ref 22–29)
CO2 SERPL-SCNC: 25 MMOL/L (ref 22–29)
CREAT SERPL-MCNC: 0.95 MG/DL (ref 0.76–1.27)
CREAT SERPL-MCNC: 0.98 MG/DL (ref 0.76–1.27)
D DIMER PPP FEU-MCNC: 0.31 MCGFEU/ML (ref 0–0.56)
D-LACTATE SERPL-SCNC: 1.4 MMOL/L (ref 0.5–2)
DEPRECATED RDW RBC AUTO: 44.5 FL (ref 37–54)
EOSINOPHIL # BLD AUTO: 0.33 10*3/MM3 (ref 0–0.4)
EOSINOPHIL NFR BLD AUTO: 4 % (ref 0.3–6.2)
ERYTHROCYTE [DISTWIDTH] IN BLOOD BY AUTOMATED COUNT: 13.5 % (ref 12.3–15.4)
FLUAV SUBTYP SPEC NAA+PROBE: NOT DETECTED
FLUBV RNA ISLT QL NAA+PROBE: NOT DETECTED
GFR SERPL CREATININE-BSD FRML MDRD: 78 ML/MIN/1.73
GFR SERPL CREATININE-BSD FRML MDRD: 81 ML/MIN/1.73
GLOBULIN UR ELPH-MCNC: 2.3 GM/DL
GLUCOSE BLDC GLUCOMTR-MCNC: 193 MG/DL (ref 70–130)
GLUCOSE SERPL-MCNC: 120 MG/DL (ref 65–99)
GLUCOSE SERPL-MCNC: 147 MG/DL (ref 65–99)
HBA1C MFR BLD: 6.8 % (ref 4.8–5.6)
HCT VFR BLD AUTO: 41.4 % (ref 37.5–51)
HGB BLD-MCNC: 13.7 G/DL (ref 13–17.7)
HOLD SPECIMEN: NORMAL
IMM GRANULOCYTES # BLD AUTO: 0.03 10*3/MM3 (ref 0–0.05)
IMM GRANULOCYTES NFR BLD AUTO: 0.4 % (ref 0–0.5)
INR PPP: 0.97 (ref 0.85–1.16)
LIPASE SERPL-CCNC: 39 U/L (ref 13–60)
LYMPHOCYTES # BLD AUTO: 2.09 10*3/MM3 (ref 0.7–3.1)
LYMPHOCYTES NFR BLD AUTO: 25.4 % (ref 19.6–45.3)
MAGNESIUM SERPL-MCNC: 2 MG/DL (ref 1.6–2.6)
MCH RBC QN AUTO: 30 PG (ref 26.6–33)
MCHC RBC AUTO-ENTMCNC: 33.1 G/DL (ref 31.5–35.7)
MCV RBC AUTO: 90.8 FL (ref 79–97)
MONOCYTES # BLD AUTO: 0.56 10*3/MM3 (ref 0.1–0.9)
MONOCYTES NFR BLD AUTO: 6.8 % (ref 5–12)
NEUTROPHILS NFR BLD AUTO: 5.17 10*3/MM3 (ref 1.7–7)
NEUTROPHILS NFR BLD AUTO: 62.8 % (ref 42.7–76)
NRBC BLD AUTO-RTO: 0 /100 WBC (ref 0–0.2)
NT-PROBNP SERPL-MCNC: 508.6 PG/ML (ref 0–900)
PLATELET # BLD AUTO: 239 10*3/MM3 (ref 140–450)
PMV BLD AUTO: 9.4 FL (ref 6–12)
POTASSIUM SERPL-SCNC: 3.8 MMOL/L (ref 3.5–5.2)
POTASSIUM SERPL-SCNC: 3.9 MMOL/L (ref 3.5–5.2)
PROCALCITONIN SERPL-MCNC: 0.1 NG/ML (ref 0–0.25)
PROT SERPL-MCNC: 6 G/DL (ref 6–8.5)
PROTHROMBIN TIME: 12.6 SECONDS (ref 11.4–14.4)
RBC # BLD AUTO: 4.56 10*6/MM3 (ref 4.14–5.8)
SARS-COV-2 RNA PNL SPEC NAA+PROBE: NOT DETECTED
SODIUM SERPL-SCNC: 137 MMOL/L (ref 136–145)
SODIUM SERPL-SCNC: 142 MMOL/L (ref 136–145)
TROPONIN T SERPL-MCNC: <0.01 NG/ML (ref 0–0.03)
TROPONIN T SERPL-MCNC: <0.01 NG/ML (ref 0–0.03)
UFH PPP CHRO-ACNC: 0.1 IU/ML (ref 0.3–0.7)
WBC # BLD AUTO: 8.23 10*3/MM3 (ref 3.4–10.8)
WHOLE BLOOD HOLD SPECIMEN: NORMAL

## 2021-07-12 PROCEDURE — 83735 ASSAY OF MAGNESIUM: CPT | Performed by: FAMILY MEDICINE

## 2021-07-12 PROCEDURE — 93005 ELECTROCARDIOGRAM TRACING: CPT

## 2021-07-12 PROCEDURE — 85610 PROTHROMBIN TIME: CPT | Performed by: PHYSICIAN ASSISTANT

## 2021-07-12 PROCEDURE — 83880 ASSAY OF NATRIURETIC PEPTIDE: CPT

## 2021-07-12 PROCEDURE — C9803 HOPD COVID-19 SPEC COLLECT: HCPCS

## 2021-07-12 PROCEDURE — G0378 HOSPITAL OBSERVATION PER HR: HCPCS

## 2021-07-12 PROCEDURE — 84484 ASSAY OF TROPONIN QUANT: CPT

## 2021-07-12 PROCEDURE — 84484 ASSAY OF TROPONIN QUANT: CPT | Performed by: EMERGENCY MEDICINE

## 2021-07-12 PROCEDURE — 25010000002 HEPARIN (PORCINE) 25000-0.45 UT/250ML-% SOLUTION: Performed by: PHYSICIAN ASSISTANT

## 2021-07-12 PROCEDURE — 84145 PROCALCITONIN (PCT): CPT | Performed by: FAMILY MEDICINE

## 2021-07-12 PROCEDURE — 83605 ASSAY OF LACTIC ACID: CPT | Performed by: FAMILY MEDICINE

## 2021-07-12 PROCEDURE — 99220 PR INITIAL OBSERVATION CARE/DAY 70 MINUTES: CPT | Performed by: FAMILY MEDICINE

## 2021-07-12 PROCEDURE — 93005 ELECTROCARDIOGRAM TRACING: CPT | Performed by: EMERGENCY MEDICINE

## 2021-07-12 PROCEDURE — 96366 THER/PROPH/DIAG IV INF ADDON: CPT

## 2021-07-12 PROCEDURE — 25010000002 HEPARIN (PORCINE) PER 1000 UNITS: Performed by: PHYSICIAN ASSISTANT

## 2021-07-12 PROCEDURE — 83036 HEMOGLOBIN GLYCOSYLATED A1C: CPT | Performed by: FAMILY MEDICINE

## 2021-07-12 PROCEDURE — 82962 GLUCOSE BLOOD TEST: CPT

## 2021-07-12 PROCEDURE — 85730 THROMBOPLASTIN TIME PARTIAL: CPT | Performed by: PHYSICIAN ASSISTANT

## 2021-07-12 PROCEDURE — 85379 FIBRIN DEGRADATION QUANT: CPT | Performed by: FAMILY MEDICINE

## 2021-07-12 PROCEDURE — 96361 HYDRATE IV INFUSION ADD-ON: CPT

## 2021-07-12 PROCEDURE — 80048 BASIC METABOLIC PNL TOTAL CA: CPT | Performed by: FAMILY MEDICINE

## 2021-07-12 PROCEDURE — 96365 THER/PROPH/DIAG IV INF INIT: CPT

## 2021-07-12 PROCEDURE — 96376 TX/PRO/DX INJ SAME DRUG ADON: CPT

## 2021-07-12 PROCEDURE — 71045 X-RAY EXAM CHEST 1 VIEW: CPT

## 2021-07-12 PROCEDURE — 87636 SARSCOV2 & INF A&B AMP PRB: CPT | Performed by: FAMILY MEDICINE

## 2021-07-12 PROCEDURE — 80053 COMPREHEN METABOLIC PANEL: CPT

## 2021-07-12 PROCEDURE — 85520 HEPARIN ASSAY: CPT | Performed by: PHYSICIAN ASSISTANT

## 2021-07-12 PROCEDURE — 99284 EMERGENCY DEPT VISIT MOD MDM: CPT

## 2021-07-12 PROCEDURE — 85025 COMPLETE CBC W/AUTO DIFF WBC: CPT

## 2021-07-12 PROCEDURE — 84484 ASSAY OF TROPONIN QUANT: CPT | Performed by: FAMILY MEDICINE

## 2021-07-12 PROCEDURE — 83690 ASSAY OF LIPASE: CPT

## 2021-07-12 RX ORDER — SODIUM CHLORIDE 0.9 % (FLUSH) 0.9 %
10 SYRINGE (ML) INJECTION AS NEEDED
Status: DISCONTINUED | OUTPATIENT
Start: 2021-07-12 | End: 2021-07-14 | Stop reason: HOSPADM

## 2021-07-12 RX ORDER — HEPARIN SODIUM 1000 [USP'U]/ML
60 INJECTION, SOLUTION INTRAVENOUS; SUBCUTANEOUS AS NEEDED
Status: DISCONTINUED | OUTPATIENT
Start: 2021-07-12 | End: 2021-07-12 | Stop reason: ALTCHOICE

## 2021-07-12 RX ORDER — ATORVASTATIN CALCIUM 40 MG/1
80 TABLET, FILM COATED ORAL NIGHTLY
Status: DISCONTINUED | OUTPATIENT
Start: 2021-07-12 | End: 2021-07-14 | Stop reason: HOSPADM

## 2021-07-12 RX ORDER — ACETAMINOPHEN 650 MG/1
650 SUPPOSITORY RECTAL EVERY 4 HOURS PRN
Status: DISCONTINUED | OUTPATIENT
Start: 2021-07-12 | End: 2021-07-14 | Stop reason: HOSPADM

## 2021-07-12 RX ORDER — HYDROCHLOROTHIAZIDE 25 MG/1
25 TABLET ORAL DAILY
Status: DISCONTINUED | OUTPATIENT
Start: 2021-07-13 | End: 2021-07-14

## 2021-07-12 RX ORDER — LAMOTRIGINE 100 MG/1
200 TABLET ORAL DAILY
Status: DISCONTINUED | OUTPATIENT
Start: 2021-07-13 | End: 2021-07-14 | Stop reason: HOSPADM

## 2021-07-12 RX ORDER — HEPARIN SODIUM 1000 [USP'U]/ML
30 INJECTION, SOLUTION INTRAVENOUS; SUBCUTANEOUS AS NEEDED
Status: DISCONTINUED | OUTPATIENT
Start: 2021-07-12 | End: 2021-07-12 | Stop reason: ALTCHOICE

## 2021-07-12 RX ORDER — HEPARIN SODIUM 10000 [USP'U]/100ML
12 INJECTION, SOLUTION INTRAVENOUS
Status: DISCONTINUED | OUTPATIENT
Start: 2021-07-12 | End: 2021-07-13

## 2021-07-12 RX ORDER — SODIUM CHLORIDE 0.9 % (FLUSH) 0.9 %
10 SYRINGE (ML) INJECTION EVERY 12 HOURS SCHEDULED
Status: DISCONTINUED | OUTPATIENT
Start: 2021-07-12 | End: 2021-07-14 | Stop reason: HOSPADM

## 2021-07-12 RX ORDER — NICOTINE POLACRILEX 4 MG
15 LOZENGE BUCCAL
Status: DISCONTINUED | OUTPATIENT
Start: 2021-07-12 | End: 2021-07-14 | Stop reason: HOSPADM

## 2021-07-12 RX ORDER — ASPIRIN 81 MG/1
324 TABLET, CHEWABLE ORAL ONCE
Status: COMPLETED | OUTPATIENT
Start: 2021-07-12 | End: 2021-07-12

## 2021-07-12 RX ORDER — HEPARIN SODIUM 1000 [USP'U]/ML
4000 INJECTION, SOLUTION INTRAVENOUS; SUBCUTANEOUS ONCE
Status: COMPLETED | OUTPATIENT
Start: 2021-07-12 | End: 2021-07-12

## 2021-07-12 RX ORDER — ACETAMINOPHEN 160 MG/5ML
650 SOLUTION ORAL EVERY 4 HOURS PRN
Status: DISCONTINUED | OUTPATIENT
Start: 2021-07-12 | End: 2021-07-14 | Stop reason: HOSPADM

## 2021-07-12 RX ORDER — DEXTROSE MONOHYDRATE 25 G/50ML
25 INJECTION, SOLUTION INTRAVENOUS
Status: DISCONTINUED | OUTPATIENT
Start: 2021-07-12 | End: 2021-07-14 | Stop reason: HOSPADM

## 2021-07-12 RX ORDER — ACETAMINOPHEN 325 MG/1
650 TABLET ORAL EVERY 4 HOURS PRN
Status: DISCONTINUED | OUTPATIENT
Start: 2021-07-12 | End: 2021-07-14 | Stop reason: HOSPADM

## 2021-07-12 RX ORDER — BUPROPION HYDROCHLORIDE 150 MG/1
300 TABLET ORAL DAILY
Status: DISCONTINUED | OUTPATIENT
Start: 2021-07-13 | End: 2021-07-14 | Stop reason: HOSPADM

## 2021-07-12 RX ORDER — FLUOXETINE HYDROCHLORIDE 20 MG/1
20 CAPSULE ORAL DAILY
Status: DISCONTINUED | OUTPATIENT
Start: 2021-07-13 | End: 2021-07-14 | Stop reason: HOSPADM

## 2021-07-12 RX ADMIN — HEPARIN SODIUM 6.1 UNITS/KG/HR: 10000 INJECTION, SOLUTION INTRAVENOUS at 20:28

## 2021-07-12 RX ADMIN — NITROGLYCERIN 1 INCH: 20 OINTMENT TOPICAL at 20:25

## 2021-07-12 RX ADMIN — ATORVASTATIN CALCIUM 80 MG: 40 TABLET, FILM COATED ORAL at 22:49

## 2021-07-12 RX ADMIN — ASPIRIN 81 MG CHEWABLE TABLET 324 MG: 81 TABLET CHEWABLE at 19:44

## 2021-07-12 RX ADMIN — HEPARIN SODIUM 4000 UNITS: 1000 INJECTION INTRAVENOUS; SUBCUTANEOUS at 20:28

## 2021-07-12 RX ADMIN — SODIUM CHLORIDE, PRESERVATIVE FREE 10 ML: 5 INJECTION INTRAVENOUS at 22:50

## 2021-07-12 NOTE — ED PROVIDER NOTES
Subjective   61-year-old male presents the emergency department today with progressive chest pain.  He reports over the past 5 days has been having chest pain with exertion he gets short of breath and diaphoretic.  He reports that this is gotten progressively worse and it seems to be shorter and shorter distances that he has discomfort.  He did have a heart cath in 2012 he states that it was essentially unremarkable.  He has risk factors and that he is a diabetic, hypertension, hyperlipidemia, male and he is 61 years old.  Exertion causes his chest pain diaphoresis and shortness of breath rest for about 10 minutes seems to decrease this.  He has had increasing shortness of breath and increasing pedal edema as well but no prior history of coronary disease no prior history of CHF.  He denies fevers chills rigors.  He has had no loss of smell or taste.  No productive cough.  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      Chest Pain  Pain location:  Substernal area  Pain quality: pressure    Pain radiates to:  Neck  Pain severity:  Moderate  Onset quality:  Gradual  Duration:  5 days  Timing:  Intermittent  Progression:  Worsening  Chronicity:  New  Context comment:  Chest pain shortness of breath with exertion and the distances are getting shorter and shorter.  Relieved by:  Rest  Worsened by:  Exertion  Ineffective treatments:  None tried  Associated symptoms: diaphoresis, lower extremity edema and shortness of breath    Associated symptoms: no abdominal pain, no anorexia, no anxiety, no back pain, no claudication, no cough, no dysphagia, no fever, no nausea, no orthopnea, no palpitations, no PND and no vomiting    Risk factors: diabetes mellitus, high cholesterol, hypertension, male sex and obesity        Review of Systems   Constitutional: Positive for diaphoresis. Negative for fever.   HENT: Negative for trouble swallowing.    Respiratory:  Positive for shortness of breath. Negative for cough.    Cardiovascular: Positive for chest pain. Negative for palpitations, orthopnea, claudication and PND.   Gastrointestinal: Negative for abdominal pain, anal bleeding, anorexia, diarrhea, nausea and vomiting.   Musculoskeletal: Negative for back pain and neck pain.   Skin: Negative for pallor and rash.   Psychiatric/Behavioral: Negative.    All other systems reviewed and are negative.      Past Medical History:   Diagnosis Date   • Bipolar 1 disorder (CMS/HCC)    • Diabetes mellitus (CMS/HCC)    • Diverticulitis    • Hyperlipidemia    • Hypertension    • Mood disorder (CMS/HCC)    • Respiratory infection        Allergies   Allergen Reactions   • Zestoretic [Lisinopril-Hydrochlorothiazide] Cough       Past Surgical History:   Procedure Laterality Date   • CARDIAC CATHETERIZATION     • CHOLECYSTECTOMY     • COLONOSCOPY     • GALLBLADDER SURGERY     • SHOULDER SURGERY Right     REPAIR        Family History   Problem Relation Age of Onset   • Cancer Mother    • Hypertension Mother    • Deep vein thrombosis Mother    • Diabetes Father    • Heart disease Father    • Hypertension Father    • COPD Father    • Heart failure Father        Social History     Socioeconomic History   • Marital status:      Spouse name: Not on file   • Number of children: Not on file   • Years of education: Not on file   • Highest education level: Not on file   Tobacco Use   • Smoking status: Former Smoker     Packs/day: 1.00     Years: 8.00     Pack years: 8.00     Types: Cigarettes     Start date:      Quit date:      Years since quittin.5   • Smokeless tobacco: Never Used   Vaping Use   • Vaping Use: Never used   Substance and Sexual Activity   • Alcohol use: Never   • Drug use: Never   • Sexual activity: Defer           Objective   Physical Exam  Vitals and nursing note reviewed.   Constitutional:       General: He is not in acute distress.     Appearance: He is  well-developed. He is not ill-appearing or diaphoretic.      Comments: Warm pink dry afebrile   HENT:      Head: Normocephalic and atraumatic.      Right Ear: External ear normal.      Left Ear: External ear normal.      Nose: Nose normal.   Eyes:      General: No scleral icterus.     Conjunctiva/sclera: Conjunctivae normal.      Pupils: Pupils are equal, round, and reactive to light.   Neck:      Thyroid: No thyromegaly.      Vascular: No JVD.   Cardiovascular:      Rate and Rhythm: Normal rate and regular rhythm.  No extrasystoles are present.     Heart sounds: Normal heart sounds. Heart sounds not distant. No murmur heard.        Comments: +2 pedal edema bilaterally  Pulmonary:      Effort: Pulmonary effort is normal. No respiratory distress.      Breath sounds: Normal breath sounds. No wheezing or rales.   Chest:      Chest wall: No tenderness.   Abdominal:      General: Bowel sounds are normal. There is no distension.      Palpations: Abdomen is soft.      Tenderness: There is no abdominal tenderness.   Musculoskeletal:         General: Normal range of motion.      Cervical back: Normal range of motion.      Right lower leg: No tenderness. Edema present.      Left lower leg: No tenderness. Edema present.   Lymphadenopathy:      Cervical: No cervical adenopathy.   Skin:     General: Skin is warm and dry.   Neurological:      Mental Status: He is alert and oriented to person, place, and time.      Cranial Nerves: No cranial nerve deficit.      Coordination: Coordination normal.      Deep Tendon Reflexes: Reflexes are normal and symmetric. Reflexes normal.   Psychiatric:         Behavior: Behavior normal.         Thought Content: Thought content normal.         Judgment: Judgment normal.         Procedures           ED Course  ED Course as of Jul 13 2243 Mon Jul 12, 2021 2007 Mr. Chowdary history is very concerning for angina and it seems to be a crescendoing, his distances are getting shorter and his pain is  lasting longer.  His heart score is a 6.  Multiple risk factors I talked to the hospitalist but keep him overnight to have cardiology see him in the morning.    [JOHN]   2015 I spoke to Dr. Choi she will admit the patient for cardiology to see in the morning.    [JOHN]      ED Course User Index  [JOHN] Juliocesar De La Paz PA    Differential diagnosis #1 STEMI #2 NSTEMI #3 progressive angina #4 CHF #5 pneumonia #6 Covid  Recent Results (from the past 24 hour(s))   POC Glucose Once    Collection Time: 07/12/21 11:39 PM    Specimen: Blood   Result Value Ref Range    Glucose 193 (H) 70 - 130 mg/dL   Troponin    Collection Time: 07/12/21 11:41 PM    Specimen: Blood   Result Value Ref Range    Troponin T <0.010 0.000 - 0.030 ng/mL   Magnesium    Collection Time: 07/12/21 11:41 PM    Specimen: Blood   Result Value Ref Range    Magnesium 1.9 1.6 - 2.6 mg/dL   ECG 12 Lead    Collection Time: 07/12/21 11:41 PM   Result Value Ref Range    QT Interval 402 ms    QTC Interval 436 ms   Heparin Anti-Xa    Collection Time: 07/13/21  2:10 AM    Specimen: Blood   Result Value Ref Range    Heparin Anti-Xa (UFH) 0.10 (L) 0.30 - 0.70 IU/ml   Basic Metabolic Panel    Collection Time: 07/13/21  2:10 AM    Specimen: Blood   Result Value Ref Range    Glucose 229 (H) 65 - 99 mg/dL    BUN 12 6 - 20 mg/dL    Creatinine 1.00 0.76 - 1.27 mg/dL    Sodium 134 (L) 136 - 145 mmol/L    Potassium 3.7 3.5 - 5.2 mmol/L    Chloride 101 98 - 107 mmol/L    CO2 26.0 22.0 - 29.0 mmol/L    Calcium 8.5 (L) 8.6 - 10.5 mg/dL    eGFR Non African Amer 76 >60 mL/min/1.73    BUN/Creatinine Ratio 12.0 7.0 - 25.0    Anion Gap 7.0 5.0 - 15.0 mmol/L   Magnesium    Collection Time: 07/13/21  2:10 AM    Specimen: Blood   Result Value Ref Range    Magnesium 1.9 1.6 - 2.6 mg/dL   CBC Auto Differential    Collection Time: 07/13/21  2:10 AM    Specimen: Blood   Result Value Ref Range    WBC 6.25 3.40 - 10.80 10*3/mm3    RBC 4.51 4.14 - 5.80 10*6/mm3    Hemoglobin 13.4 13.0 - 17.7  g/dL    Hematocrit 41.3 37.5 - 51.0 %    MCV 91.6 79.0 - 97.0 fL    MCH 29.7 26.6 - 33.0 pg    MCHC 32.4 31.5 - 35.7 g/dL    RDW 13.7 12.3 - 15.4 %    RDW-SD 45.8 37.0 - 54.0 fl    MPV 9.4 6.0 - 12.0 fL    Platelets 214 140 - 450 10*3/mm3    Neutrophil % 54.1 42.7 - 76.0 %    Lymphocyte % 33.4 19.6 - 45.3 %    Monocyte % 6.9 5.0 - 12.0 %    Eosinophil % 4.3 0.3 - 6.2 %    Basophil % 0.8 0.0 - 1.5 %    Immature Grans % 0.5 0.0 - 0.5 %    Neutrophils, Absolute 3.38 1.70 - 7.00 10*3/mm3    Lymphocytes, Absolute 2.09 0.70 - 3.10 10*3/mm3    Monocytes, Absolute 0.43 0.10 - 0.90 10*3/mm3    Eosinophils, Absolute 0.27 0.00 - 0.40 10*3/mm3    Basophils, Absolute 0.05 0.00 - 0.20 10*3/mm3    Immature Grans, Absolute 0.03 0.00 - 0.05 10*3/mm3    nRBC 0.0 0.0 - 0.2 /100 WBC   TSH    Collection Time: 07/13/21  2:10 AM    Specimen: Blood   Result Value Ref Range    TSH 1.360 0.270 - 4.200 uIU/mL   Lipid Panel    Collection Time: 07/13/21  2:10 AM    Specimen: Blood   Result Value Ref Range    Total Cholesterol 153 0 - 200 mg/dL    Triglycerides 200 (H) 0 - 150 mg/dL    HDL Cholesterol 39 (L) 40 - 60 mg/dL    LDL Cholesterol  80 0 - 100 mg/dL    VLDL Cholesterol 34 5 - 40 mg/dL    LDL/HDL Ratio 1.90    Urinalysis With Culture If Indicated - Urine, Clean Catch    Collection Time: 07/13/21  2:46 AM    Specimen: Urine, Clean Catch   Result Value Ref Range    Color, UA Yellow Yellow, Straw    Appearance, UA Clear Clear    pH, UA 6.5 5.0 - 8.0    Specific Gravity, UA 1.010 1.001 - 1.030    Glucose,  mg/dL (2+) (A) Negative    Ketones, UA Negative Negative    Bilirubin, UA Negative Negative    Blood, UA Negative Negative    Protein, UA Negative Negative    Leuk Esterase, UA Negative Negative    Nitrite, UA Negative Negative    Urobilinogen, UA 0.2 E.U./dL 0.2 - 1.0 E.U./dL   ECG 12 Lead    Collection Time: 07/13/21  5:13 AM   Result Value Ref Range    QT Interval 394 ms    QTC Interval 425 ms   POC Glucose Once    Collection  Time: 07/13/21  7:16 AM    Specimen: Blood   Result Value Ref Range    Glucose 193 (H) 70 - 130 mg/dL   Adult Transthoracic Echo Complete W/ Cont if Necessary Per Protocol    Collection Time: 07/13/21  8:58 AM   Result Value Ref Range    BSA 2.7 m^2    IVSd 1.1 cm    LVIDd 4.7 cm    LVIDs 3.3 cm    LVPWd 1.2 cm    IVS/LVPW 0.95     FS 30.8 %    EDV(Teich) 104.4 ml    ESV(Teich) 43.5 ml    EF(Teich) 58.3 %    EDV(cubed) 106.5 ml    ESV(cubed) 35.3 ml    EF(cubed) 66.9 %    LV mass(C)d 202.3 grams    LV mass(C)dI 74.4 grams/m^2    SV(Teich) 60.9 ml    SI(Teich) 22.4 ml/m^2    SV(cubed) 71.2 ml    SI(cubed) 26.2 ml/m^2    Ao root diam 3.3 cm    Ao root area 8.6 cm^2    LA dimension 4.5 cm    LA/Ao 1.4     LVOT diam 2.0 cm    LVOT area 3.1 cm^2    LVOT area(traced) 3.1 cm^2    LAd major 4.3 cm    LVLd ap4 9.8 cm    EDV(MOD-sp4) 255.0 ml    LVLs ap4 7.8 cm    ESV(MOD-sp4) 99.0 ml    EF(MOD-sp4) 61.2 %    LVLd ap2 8.0 cm    EDV(MOD-sp2) 196.0 ml    LVLs ap2 6.6 cm    ESV(MOD-sp2) 77.0 ml    EF(MOD-sp2) 60.7 %    LA volume 60.0 ml    EF(MOD-bp) 61.0 %    SV(MOD-sp4) 156.0 ml    SI(MOD-sp4) 57.3 ml/m^2    SV(MOD-sp2) 119.0 ml    SI(MOD-sp2) 43.7 ml/m^2    Ao root area (BSA corrected) 1.2     LV Can Vol (BSA corrected) 93.7 ml/m^2    LV Sys Vol (BSA corrected) 36.4 ml/m^2    LA Volume Index 22.1 ml/m^2    MV E max [place] 73.5 cm/sec    MV A max [place] 66.1 cm/sec    MV E/A 1.1     MV V2 max 105.0 cm/sec    MV max PG 4.4 mmHg    MV V2 mean 62.5 cm/sec    MV mean PG 2.0 mmHg    MV V2 VTI 31.0 cm    MVA(VTI) 2.1 cm^2    MV dec time 0.17 sec    Ao pk [place] 123.0 cm/sec    Ao max PG 6.1 mmHg    Ao max PG (full) 3.6 mmHg    Ao V2 mean 88.5 cm/sec    Ao mean PG 4.0 mmHg    Ao mean PG (full) 2.0 mmHg    Ao V2 VTI 27.1 cm    ESTELLA(I,A) 2.4 cm^2    ESTELLA(I,D) 2.4 cm^2    ESTELLA(V,A) 2.0 cm^2    ESTELLA(V,D) 2.0 cm^2    LV V1 max PG 2.4 mmHg    LV V1 mean PG 2.0 mmHg    LV V1 max 78.2 cm/sec    LV V1 mean 58.3 cm/sec    LV V1 VTI 20.5 cm    SV(Ao) 231.8  ml    SI(Ao) 85.2 ml/m^2    SV(LVOT) 64.4 ml    SI(LVOT) 23.7 ml/m^2    PA V2 max 89.6 cm/sec    PA max PG 3.2 mmHg    PA acc slope 462.0 cm/sec^2    PA acc time 0.13 sec    PA pr(Accel) 20.5 mmHg    Lat E/e'  11.5     Med E/e' 8.9     Lat Peak E' Kevin 6.4 cm/sec    Med Peak E' Kevin 8.3 cm/sec     CV ECHO SAUL - BZI_BMI 53.5 kilograms/m^2     CV ECHO SAUL - BSA(HAYCOCK) 3.0 m^2     CV ECHO SAUL - BZI_METRIC_WEIGHT 169.2 kg     CV ECHO SAUL - BZI_METRIC_HEIGHT 177.8 cm    Avg E/e' ratio 10.00     Target HR (85%) 137 bpm    Max. Pred. HR (100%) 161 bpm    RV S' 16.40 cm/sec    RV Base 4.10 cm    RV Length 7.80 cm    RV Mid 2.60 cm    TAPSE (>1.6) 2.70 cm   Heparin Anti-Xa    Collection Time: 07/13/21  9:29 AM    Specimen: Blood   Result Value Ref Range    Heparin Anti-Xa (UFH) 0.24 (L) 0.30 - 0.70 IU/ml   POC Glucose Once    Collection Time: 07/13/21 11:58 AM    Specimen: Blood   Result Value Ref Range    Glucose 159 (H) 70 - 130 mg/dL   Heparin Anti-Xa    Collection Time: 07/13/21  6:17 PM    Specimen: Blood   Result Value Ref Range    Heparin Anti-Xa (UFH) 0.45 0.30 - 0.70 IU/ml   POC Glucose Once    Collection Time: 07/13/21  7:35 PM    Specimen: Blood   Result Value Ref Range    Glucose 231 (H) 70 - 130 mg/dL     Note: In addition to lab results from this visit, the labs listed above may include labs taken at another facility or during a different encounter within the last 24 hours. Please correlate lab times with ED admission and discharge times for further clarification of the services performed during this visit.    CT Angiogram Chest With & Without Contrast   Final Result   1. Negative for pulmonary embolus.   2. There is bibasilar atelectasis/consolidation with small bilateral pleural effusions      Signer Name: Fani Sal MD    Signed: 7/13/2021 9:29 PM    Workstation Name: [id number]     [facility]      XR Chest 1 View   Preliminary Result   No acute cardiopulmonary disease.        D:  07/12/2021   E:  07/13/2021                Vitals:    07/13/21 1500 07/13/21 1617 07/13/21 1713 07/13/21 1933   BP: 153/93 178/96 (!) 186/88 159/75   BP Location: Left arm   Right arm   Patient Position: Sitting   Lying   Pulse: 80 69 75 78   Resp: 22 18 18 22   Temp: 97.1 °F (36.2 °C)   97.1 °F (36.2 °C)   TempSrc: Oral   Oral   SpO2:  98% 96% 94%   Weight:       Height:         Medications   sodium chloride 0.9 % flush 10 mL ( Intravenous MAR Hold 7/13/21 1611)   sodium chloride 0.9 % flush 10 mL ( Intravenous Dose Auto Held 7/28/21 2100)   sodium chloride 0.9 % flush 10 mL ( Intravenous MAR Hold 7/13/21 1611)   nitroglycerin (NITROSTAT) ointment 0.5 inch ( Topical MAR Hold 7/13/21 1611)   atorvastatin (LIPITOR) tablet 80 mg ( Oral Dose Auto Held 7/28/21 2100)   acetaminophen (TYLENOL) tablet 650 mg ( Oral MAR Hold 7/13/21 1611)     Or   acetaminophen (TYLENOL) 160 MG/5ML solution 650 mg ( Oral MAR Hold 7/13/21 1611)     Or   acetaminophen (TYLENOL) suppository 650 mg ( Rectal MAR Hold 7/13/21 1611)   lamoTRIgine (LaMICtal) tablet 200 mg (200 mg Oral Given 7/13/21 0851)   buPROPion XL (WELLBUTRIN XL) 24 hr tablet 300 mg ( Oral Dose Auto Held 7/21/21 0900)   FLUoxetine (PROzac) capsule 20 mg ( Oral Dose Auto Held 7/21/21 0900)   hydroCHLOROthiazide (HYDRODIURIL) tablet 25 mg ( Oral Dose Auto Held 7/21/21 0900)   dextrose (GLUTOSE) oral gel 15 g ( Oral MAR Hold 7/13/21 1611)   dextrose (D50W) 25 g/ 50mL Intravenous Solution 25 g ( Intravenous MAR Hold 7/13/21 1611)   glucagon (human recombinant) (GLUCAGEN DIAGNOSTIC) injection 1 mg ( Subcutaneous MAR Hold 7/13/21 1611)   insulin lispro (humaLOG) injection 0-9 Units ( Subcutaneous Dose Auto Held 7/21/21 2100)   Magnesium Sulfate 2 gram Bolus, followed by 8 gram infusion (total Mg dose 10 grams)- Mg less than or equal to 1mg/dL ( Intravenous MAR Hold 7/13/21 1611)     Or   Magnesium Sulfate 2 gram / 50mL Infusion (GIVE X 3 BAGS TO EQUAL 6GM TOTAL DOSE) - Mg  1.1 - 1.5 mg/dl ( Intravenous MAR Hold 7/13/21 1611)     Or   Magnesium Sulfate 4 gram infusion- Mg 1.6-1.9 mg/dL ( Intravenous MAR Hold 7/13/21 1611)   sodium chloride 0.9 % infusion (75 mL/hr Intravenous New Bag 7/13/21 1924)   acetaminophen (TYLENOL) tablet 650 mg (has no administration in time range)   HYDROcodone-acetaminophen (NORCO) 7.5-325 MG per tablet 1 tablet (has no administration in time range)   morphine injection 1 mg (has no administration in time range)     And   naloxone (NARCAN) injection 0.4 mg (has no administration in time range)   ALPRAZolam (XANAX) tablet 0.25 mg (has no administration in time range)   ondansetron (ZOFRAN) injection 4 mg (4 mg Intravenous Given 7/13/21 1755)   aspirin chewable tablet 324 mg (324 mg Oral Given 7/12/21 1944)   nitroglycerin (NITROSTAT) ointment 1 inch (1 inch Topical Given 7/12/21 2025)   heparin (porcine) injection 4,000 Units (4,000 Units Intravenous Given 7/12/21 2028)   magnesium sulfate 2g/50 mL (PREMIX) infusion (0 g Intravenous Stopped 7/13/21 0623)   heparin (porcine) injection 6,000 Units (6,000 Units Intravenous Given 7/13/21 0333)   heparin (porcine) injection 5,000 Units (5,000 Units Intravenous Given 7/13/21 1049)   sodium chloride 0.9 % infusion (75 mL/hr Intravenous New Bag 7/13/21 1622)   iopamidol (ISOVUE-370) 76 % injection 100 mL (85 mL Intravenous Given 7/13/21 2119)     ECG/EMG Results (last 24 hours)     Procedure Component Value Units Date/Time    ECG 12 Lead [[id number]] Collected: 07/12/21 1623     Updated: 07/12/21 1622    ECG 12 Lead [[id number]] Collected: 07/12/21 2000     Updated: 07/12/21 1958        ECG 12 Lead   Final Result   Test Reason : [place]   Blood Pressure :   */*   mmHG   Vent. Rate :  70 BPM     Atrial Rate :  70 BPM      P-R Int : 130 ms          QRS Dur :  86 ms       QT Int : 394 ms       P-R-T Axes :  40  48   0 degrees      QTc Int : 425 ms      Normal sinus rhythm   Normal ECG   When compared with ECG of 12-JUL-2021  23:41, (Unconfirmed)   No significant change was found   Confirmed by MANDA BUTTS MD ([id number]) on 7/13/2021 4:18:40 PM      Referred By:            Confirmed By: MANDA BUTTS MD      ECG 12 Lead   Final Result   Test Reason : [place]   Blood Pressure :   */*   mmHG   Vent. Rate :  71 BPM     Atrial Rate :  71 BPM      P-R Int : 126 ms          QRS Dur :  86 ms       QT Int : 402 ms       P-R-T Axes :   1  29 -15 degrees      QTc Int : 436 ms      ** Poor data quality, interpretation may be adversely affected   Normal sinus rhythm   Nonspecific T wave abnormality   Abnormal ECG   When compared with ECG of 12-JUL-2021 20:00, (Unconfirmed)   No significant change was found   Confirmed by MANDA BUTTS MD ([id number]) on 7/13/2021 4:18:33 PM      Referred By:            Confirmed By: MANDA BUTTS MD      ECG 12 Lead   Final Result   Test Reason : chest pain   Blood Pressure :   */*   mmHG   Vent. Rate :  73 BPM     Atrial Rate :  73 BPM      P-R Int : 128 ms          QRS Dur :  90 ms       QT Int : 368 ms       P-R-T Axes :  31  27  -8 degrees      QTc Int : 405 ms      Normal sinus rhythm   Nonspecific T wave abnormality   Abnormal ECG   When compared with ECG of 12-JUL-2021 16:23, (Unconfirmed)   No significant change was found   Confirmed by DOMINGA MCCRAY MD ([id number]) on 7/13/2021 1:17:03 AM      Referred By: EDMD           Confirmed By: DOMINGA MCCRAY MD      ECG 12 Lead   Final Result   Test Reason : chest pain   Blood Pressure :   */*   mmHG   Vent. Rate :  76 BPM     Atrial Rate :  76 BPM      P-R Int : 144 ms          QRS Dur :  92 ms       QT Int : 344 ms       P-R-T Axes :   4  40  27 degrees      QTc Int : 387 ms      Normal sinus rhythm   Normal ECG   When compared with ECG of 29-APR-2012 00:03,   Nonspecific T wave abnormality, improved in Lateral leads   Confirmed by DOMINGA MCCRAY MD ([id number]) on 7/13/2021 1:16:27 AM      Referred By: TORY           Confirmed By: DOMINGA MCCRAY MD                                                HEART Score (for prediction of 6-week risk of major adverse cardiac event) reviewed and/or performed as part of the patient evaluation and treatment planning process.  The result associated with this review/performance is: 5       MDM  Number of Diagnoses or Management Options  Crescendo angina (CMS/HCC): new and requires workup  Essential hypertension: established and improving  Hyperlipidemia, unspecified hyperlipidemia type: established and improving     Amount and/or Complexity of Data Reviewed  Clinical lab tests: reviewed and ordered  Tests in the radiology section of CPT®: reviewed and ordered  Tests in the medicine section of CPT®: reviewed and ordered  Discuss the patient with other providers: yes    Patient Progress  Patient progress: stable      Final diagnoses:   Crescendo angina (CMS/HCC)   Essential hypertension   Hyperlipidemia, unspecified hyperlipidemia type       ED Disposition  ED Disposition     ED Disposition Condition Comment    Decision to Admit  Level of Care: Telemetry [5]   Diagnosis: Crescendo angina (CMS/HCC) [358959]   Admitting Physician: ODALYS CLEMENS [[id number]]   Attending Physician: ODALYS CLEMENS [[id number]]   Bed Request Comments: tele            No follow-up provider specified.       Medication List      No changes were made to your prescriptions during this visit.          Juliocesar De La Paz PA  07/13/21 [id number]

## 2021-07-13 ENCOUNTER — APPOINTMENT (OUTPATIENT)
Dept: CT IMAGING | Facility: HOSPITAL | Age: 61
End: 2021-07-13

## 2021-07-13 ENCOUNTER — APPOINTMENT (OUTPATIENT)
Dept: CARDIOLOGY | Facility: HOSPITAL | Age: 61
End: 2021-07-13

## 2021-07-13 PROBLEM — I20.0 UNSTABLE ANGINA (HCC): Status: ACTIVE | Noted: 2021-07-12

## 2021-07-13 LAB
ANION GAP SERPL CALCULATED.3IONS-SCNC: 7 MMOL/L (ref 5–15)
BASOPHILS # BLD AUTO: 0.05 10*3/MM3 (ref 0–0.2)
BASOPHILS NFR BLD AUTO: 0.8 % (ref 0–1.5)
BH CV ECHO MEAS - AO MAX PG (FULL): 3.6 MMHG
BH CV ECHO MEAS - AO MAX PG: 6.1 MMHG
BH CV ECHO MEAS - AO MEAN PG (FULL): 2 MMHG
BH CV ECHO MEAS - AO MEAN PG: 4 MMHG
BH CV ECHO MEAS - AO ROOT AREA (BSA CORRECTED): 1.2
BH CV ECHO MEAS - AO ROOT AREA: 8.6 CM^2
BH CV ECHO MEAS - AO ROOT DIAM: 3.3 CM
BH CV ECHO MEAS - AO V2 MAX: 123 CM/SEC
BH CV ECHO MEAS - AO V2 MEAN: 88.5 CM/SEC
BH CV ECHO MEAS - AO V2 VTI: 27.1 CM
BH CV ECHO MEAS - AVA(I,A): 2.4 CM^2
BH CV ECHO MEAS - AVA(I,D): 2.4 CM^2
BH CV ECHO MEAS - AVA(V,A): 2 CM^2
BH CV ECHO MEAS - AVA(V,D): 2 CM^2
BH CV ECHO MEAS - BSA(HAYCOCK): 3 M^2
BH CV ECHO MEAS - BSA: 2.7 M^2
BH CV ECHO MEAS - BZI_BMI: 53.5 KILOGRAMS/M^2
BH CV ECHO MEAS - BZI_METRIC_HEIGHT: 177.8 CM
BH CV ECHO MEAS - BZI_METRIC_WEIGHT: 169.2 KG
BH CV ECHO MEAS - EDV(CUBED): 106.5 ML
BH CV ECHO MEAS - EDV(MOD-SP2): 196 ML
BH CV ECHO MEAS - EDV(MOD-SP4): 255 ML
BH CV ECHO MEAS - EDV(TEICH): 104.4 ML
BH CV ECHO MEAS - EF(CUBED): 66.9 %
BH CV ECHO MEAS - EF(MOD-BP): 61 %
BH CV ECHO MEAS - EF(MOD-SP2): 60.7 %
BH CV ECHO MEAS - EF(MOD-SP4): 61.2 %
BH CV ECHO MEAS - EF(TEICH): 58.3 %
BH CV ECHO MEAS - ESV(CUBED): 35.3 ML
BH CV ECHO MEAS - ESV(MOD-SP2): 77 ML
BH CV ECHO MEAS - ESV(MOD-SP4): 99 ML
BH CV ECHO MEAS - ESV(TEICH): 43.5 ML
BH CV ECHO MEAS - FS: 30.8 %
BH CV ECHO MEAS - IVS/LVPW: 0.95
BH CV ECHO MEAS - IVSD: 1.1 CM
BH CV ECHO MEAS - LA DIMENSION: 4.5 CM
BH CV ECHO MEAS - LA/AO: 1.4
BH CV ECHO MEAS - LAD MAJOR: 4.3 CM
BH CV ECHO MEAS - LAT PEAK E' VEL: 6.4 CM/SEC
BH CV ECHO MEAS - LATERAL E/E' RATIO: 11.5
BH CV ECHO MEAS - LV DIASTOLIC VOL/BSA (35-75): 93.7 ML/M^2
BH CV ECHO MEAS - LV MASS(C)D: 202.3 GRAMS
BH CV ECHO MEAS - LV MASS(C)DI: 74.4 GRAMS/M^2
BH CV ECHO MEAS - LV MAX PG: 2.4 MMHG
BH CV ECHO MEAS - LV MEAN PG: 2 MMHG
BH CV ECHO MEAS - LV SYSTOLIC VOL/BSA (12-30): 36.4 ML/M^2
BH CV ECHO MEAS - LV V1 MAX: 78.2 CM/SEC
BH CV ECHO MEAS - LV V1 MEAN: 58.3 CM/SEC
BH CV ECHO MEAS - LV V1 VTI: 20.5 CM
BH CV ECHO MEAS - LVIDD: 4.7 CM
BH CV ECHO MEAS - LVIDS: 3.3 CM
BH CV ECHO MEAS - LVLD AP2: 8 CM
BH CV ECHO MEAS - LVLD AP4: 9.8 CM
BH CV ECHO MEAS - LVLS AP2: 6.6 CM
BH CV ECHO MEAS - LVLS AP4: 7.8 CM
BH CV ECHO MEAS - LVOT AREA (M): 3.1 CM^2
BH CV ECHO MEAS - LVOT AREA: 3.1 CM^2
BH CV ECHO MEAS - LVOT DIAM: 2 CM
BH CV ECHO MEAS - LVPWD: 1.2 CM
BH CV ECHO MEAS - MED PEAK E' VEL: 8.3 CM/SEC
BH CV ECHO MEAS - MEDIAL E/E' RATIO: 8.9
BH CV ECHO MEAS - MV A MAX VEL: 66.1 CM/SEC
BH CV ECHO MEAS - MV DEC TIME: 0.17 SEC
BH CV ECHO MEAS - MV E MAX VEL: 73.5 CM/SEC
BH CV ECHO MEAS - MV E/A: 1.1
BH CV ECHO MEAS - MV MAX PG: 4.4 MMHG
BH CV ECHO MEAS - MV MEAN PG: 2 MMHG
BH CV ECHO MEAS - MV V2 MAX: 105 CM/SEC
BH CV ECHO MEAS - MV V2 MEAN: 62.5 CM/SEC
BH CV ECHO MEAS - MV V2 VTI: 31 CM
BH CV ECHO MEAS - MVA(VTI): 2.1 CM^2
BH CV ECHO MEAS - PA ACC SLOPE: 462 CM/SEC^2
BH CV ECHO MEAS - PA ACC TIME: 0.13 SEC
BH CV ECHO MEAS - PA MAX PG: 3.2 MMHG
BH CV ECHO MEAS - PA PR(ACCEL): 20.5 MMHG
BH CV ECHO MEAS - PA V2 MAX: 89.6 CM/SEC
BH CV ECHO MEAS - SI(AO): 85.2 ML/M^2
BH CV ECHO MEAS - SI(CUBED): 26.2 ML/M^2
BH CV ECHO MEAS - SI(LVOT): 23.7 ML/M^2
BH CV ECHO MEAS - SI(MOD-SP2): 43.7 ML/M^2
BH CV ECHO MEAS - SI(MOD-SP4): 57.3 ML/M^2
BH CV ECHO MEAS - SI(TEICH): 22.4 ML/M^2
BH CV ECHO MEAS - SV(AO): 231.8 ML
BH CV ECHO MEAS - SV(CUBED): 71.2 ML
BH CV ECHO MEAS - SV(LVOT): 64.4 ML
BH CV ECHO MEAS - SV(MOD-SP2): 119 ML
BH CV ECHO MEAS - SV(MOD-SP4): 156 ML
BH CV ECHO MEAS - SV(TEICH): 60.9 ML
BH CV ECHO MEAS - TAPSE (>1.6): 2.7 CM
BH CV ECHO MEASUREMENTS AVERAGE E/E' RATIO: 10
BH CV XLRA - RV BASE: 4.1 CM
BH CV XLRA - RV LENGTH: 7.8 CM
BH CV XLRA - RV MID: 2.6 CM
BH CV XLRA - TDI S': 16.4 CM/SEC
BILIRUB UR QL STRIP: NEGATIVE
BUN SERPL-MCNC: 12 MG/DL (ref 6–20)
BUN/CREAT SERPL: 12 (ref 7–25)
CALCIUM SPEC-SCNC: 8.5 MG/DL (ref 8.6–10.5)
CHLORIDE SERPL-SCNC: 101 MMOL/L (ref 98–107)
CHOLEST SERPL-MCNC: 153 MG/DL (ref 0–200)
CLARITY UR: CLEAR
CO2 SERPL-SCNC: 26 MMOL/L (ref 22–29)
COLOR UR: YELLOW
CREAT SERPL-MCNC: 1 MG/DL (ref 0.76–1.27)
DEPRECATED RDW RBC AUTO: 45.8 FL (ref 37–54)
EOSINOPHIL # BLD AUTO: 0.27 10*3/MM3 (ref 0–0.4)
EOSINOPHIL NFR BLD AUTO: 4.3 % (ref 0.3–6.2)
ERYTHROCYTE [DISTWIDTH] IN BLOOD BY AUTOMATED COUNT: 13.7 % (ref 12.3–15.4)
GFR SERPL CREATININE-BSD FRML MDRD: 76 ML/MIN/1.73
GLUCOSE BLDC GLUCOMTR-MCNC: 159 MG/DL (ref 70–130)
GLUCOSE BLDC GLUCOMTR-MCNC: 193 MG/DL (ref 70–130)
GLUCOSE BLDC GLUCOMTR-MCNC: 231 MG/DL (ref 70–130)
GLUCOSE SERPL-MCNC: 229 MG/DL (ref 65–99)
GLUCOSE UR STRIP-MCNC: ABNORMAL MG/DL
HCT VFR BLD AUTO: 41.3 % (ref 37.5–51)
HDLC SERPL-MCNC: 39 MG/DL (ref 40–60)
HGB BLD-MCNC: 13.4 G/DL (ref 13–17.7)
HGB UR QL STRIP.AUTO: NEGATIVE
IMM GRANULOCYTES # BLD AUTO: 0.03 10*3/MM3 (ref 0–0.05)
IMM GRANULOCYTES NFR BLD AUTO: 0.5 % (ref 0–0.5)
KETONES UR QL STRIP: NEGATIVE
LDLC SERPL CALC-MCNC: 80 MG/DL (ref 0–100)
LDLC/HDLC SERPL: 1.9 {RATIO}
LEFT ATRIUM VOLUME INDEX: 22.1 ML/M^2
LEFT ATRIUM VOLUME: 60 ML
LEUKOCYTE ESTERASE UR QL STRIP.AUTO: NEGATIVE
LYMPHOCYTES # BLD AUTO: 2.09 10*3/MM3 (ref 0.7–3.1)
LYMPHOCYTES NFR BLD AUTO: 33.4 % (ref 19.6–45.3)
MAGNESIUM SERPL-MCNC: 1.9 MG/DL (ref 1.6–2.6)
MAGNESIUM SERPL-MCNC: 1.9 MG/DL (ref 1.6–2.6)
MAXIMAL PREDICTED HEART RATE: 161 BPM
MCH RBC QN AUTO: 29.7 PG (ref 26.6–33)
MCHC RBC AUTO-ENTMCNC: 32.4 G/DL (ref 31.5–35.7)
MCV RBC AUTO: 91.6 FL (ref 79–97)
MONOCYTES # BLD AUTO: 0.43 10*3/MM3 (ref 0.1–0.9)
MONOCYTES NFR BLD AUTO: 6.9 % (ref 5–12)
NEUTROPHILS NFR BLD AUTO: 3.38 10*3/MM3 (ref 1.7–7)
NEUTROPHILS NFR BLD AUTO: 54.1 % (ref 42.7–76)
NITRITE UR QL STRIP: NEGATIVE
NRBC BLD AUTO-RTO: 0 /100 WBC (ref 0–0.2)
PH UR STRIP.AUTO: 6.5 [PH] (ref 5–8)
PLATELET # BLD AUTO: 214 10*3/MM3 (ref 140–450)
PMV BLD AUTO: 9.4 FL (ref 6–12)
POTASSIUM SERPL-SCNC: 3.7 MMOL/L (ref 3.5–5.2)
PROT UR QL STRIP: NEGATIVE
QT INTERVAL: 344 MS
QT INTERVAL: 368 MS
QT INTERVAL: 394 MS
QT INTERVAL: 402 MS
QTC INTERVAL: 387 MS
QTC INTERVAL: 405 MS
QTC INTERVAL: 425 MS
QTC INTERVAL: 436 MS
RBC # BLD AUTO: 4.51 10*6/MM3 (ref 4.14–5.8)
SODIUM SERPL-SCNC: 134 MMOL/L (ref 136–145)
SP GR UR STRIP: 1.01 (ref 1–1.03)
STRESS TARGET HR: 137 BPM
TRIGL SERPL-MCNC: 200 MG/DL (ref 0–150)
TROPONIN T SERPL-MCNC: <0.01 NG/ML (ref 0–0.03)
TSH SERPL DL<=0.05 MIU/L-ACNC: 1.36 UIU/ML (ref 0.27–4.2)
UFH PPP CHRO-ACNC: 0.1 IU/ML (ref 0.3–0.7)
UFH PPP CHRO-ACNC: 0.24 IU/ML (ref 0.3–0.7)
UFH PPP CHRO-ACNC: 0.45 IU/ML (ref 0.3–0.7)
UROBILINOGEN UR QL STRIP: ABNORMAL
VLDLC SERPL-MCNC: 34 MG/DL (ref 5–40)
WBC # BLD AUTO: 6.25 10*3/MM3 (ref 3.4–10.8)

## 2021-07-13 PROCEDURE — 25010000002 FENTANYL CITRATE (PF) 50 MCG/ML SOLUTION: Performed by: INTERNAL MEDICINE

## 2021-07-13 PROCEDURE — 25010000002 HEPARIN (PORCINE) PER 1000 UNITS

## 2021-07-13 PROCEDURE — C1894 INTRO/SHEATH, NON-LASER: HCPCS | Performed by: INTERNAL MEDICINE

## 2021-07-13 PROCEDURE — 82962 GLUCOSE BLOOD TEST: CPT

## 2021-07-13 PROCEDURE — C1769 GUIDE WIRE: HCPCS | Performed by: INTERNAL MEDICINE

## 2021-07-13 PROCEDURE — 96376 TX/PRO/DX INJ SAME DRUG ADON: CPT

## 2021-07-13 PROCEDURE — 93005 ELECTROCARDIOGRAM TRACING: CPT | Performed by: FAMILY MEDICINE

## 2021-07-13 PROCEDURE — 25010000002 ONDANSETRON PER 1 MG: Performed by: INTERNAL MEDICINE

## 2021-07-13 PROCEDURE — G0378 HOSPITAL OBSERVATION PER HR: HCPCS

## 2021-07-13 PROCEDURE — 25010000002 HEPARIN (PORCINE) PER 1000 UNITS: Performed by: INTERNAL MEDICINE

## 2021-07-13 PROCEDURE — 25010000002 MAGNESIUM SULFATE 2 GM/50ML SOLUTION: Performed by: FAMILY MEDICINE

## 2021-07-13 PROCEDURE — 99226 PR SBSQ OBSERVATION CARE/DAY 35 MINUTES: CPT | Performed by: INTERNAL MEDICINE

## 2021-07-13 PROCEDURE — 80048 BASIC METABOLIC PNL TOTAL CA: CPT | Performed by: FAMILY MEDICINE

## 2021-07-13 PROCEDURE — 96366 THER/PROPH/DIAG IV INF ADDON: CPT

## 2021-07-13 PROCEDURE — 83735 ASSAY OF MAGNESIUM: CPT | Performed by: FAMILY MEDICINE

## 2021-07-13 PROCEDURE — 96368 THER/DIAG CONCURRENT INF: CPT

## 2021-07-13 PROCEDURE — 80061 LIPID PANEL: CPT | Performed by: NURSE PRACTITIONER

## 2021-07-13 PROCEDURE — 85520 HEPARIN ASSAY: CPT

## 2021-07-13 PROCEDURE — 63710000001 INSULIN LISPRO (HUMAN) PER 5 UNITS: Performed by: NURSE PRACTITIONER

## 2021-07-13 PROCEDURE — 25010000002 MIDAZOLAM PER 1 MG: Performed by: INTERNAL MEDICINE

## 2021-07-13 PROCEDURE — 84443 ASSAY THYROID STIM HORMONE: CPT | Performed by: NURSE PRACTITIONER

## 2021-07-13 PROCEDURE — 99204 OFFICE O/P NEW MOD 45 MIN: CPT | Performed by: INTERNAL MEDICINE

## 2021-07-13 PROCEDURE — 71275 CT ANGIOGRAPHY CHEST: CPT

## 2021-07-13 PROCEDURE — 93458 L HRT ARTERY/VENTRICLE ANGIO: CPT | Performed by: INTERNAL MEDICINE

## 2021-07-13 PROCEDURE — 25010000002 HEPARIN (PORCINE) 25000-0.45 UT/250ML-% SOLUTION

## 2021-07-13 PROCEDURE — 85025 COMPLETE CBC W/AUTO DIFF WBC: CPT | Performed by: FAMILY MEDICINE

## 2021-07-13 PROCEDURE — 0 IOPAMIDOL PER 1 ML: Performed by: INTERNAL MEDICINE

## 2021-07-13 PROCEDURE — 81003 URINALYSIS AUTO W/O SCOPE: CPT | Performed by: FAMILY MEDICINE

## 2021-07-13 PROCEDURE — 93306 TTE W/DOPPLER COMPLETE: CPT

## 2021-07-13 RX ORDER — SODIUM CHLORIDE 0.9 % (FLUSH) 0.9 %
3 SYRINGE (ML) INJECTION EVERY 12 HOURS SCHEDULED
Status: CANCELLED | OUTPATIENT
Start: 2021-07-13

## 2021-07-13 RX ORDER — NALOXONE HCL 0.4 MG/ML
0.4 VIAL (ML) INJECTION
Status: DISCONTINUED | OUTPATIENT
Start: 2021-07-13 | End: 2021-07-14 | Stop reason: HOSPADM

## 2021-07-13 RX ORDER — ONDANSETRON 2 MG/ML
4 INJECTION INTRAMUSCULAR; INTRAVENOUS EVERY 6 HOURS PRN
Status: DISCONTINUED | OUTPATIENT
Start: 2021-07-13 | End: 2021-07-14 | Stop reason: HOSPADM

## 2021-07-13 RX ORDER — MORPHINE SULFATE 2 MG/ML
1 INJECTION, SOLUTION INTRAMUSCULAR; INTRAVENOUS EVERY 4 HOURS PRN
Status: DISCONTINUED | OUTPATIENT
Start: 2021-07-13 | End: 2021-07-14 | Stop reason: HOSPADM

## 2021-07-13 RX ORDER — ALPRAZOLAM 0.25 MG/1
0.25 TABLET ORAL 3 TIMES DAILY PRN
Status: DISCONTINUED | OUTPATIENT
Start: 2021-07-13 | End: 2021-07-14 | Stop reason: HOSPADM

## 2021-07-13 RX ORDER — FENTANYL CITRATE 50 UG/ML
INJECTION, SOLUTION INTRAMUSCULAR; INTRAVENOUS AS NEEDED
Status: DISCONTINUED | OUTPATIENT
Start: 2021-07-13 | End: 2021-07-13 | Stop reason: HOSPADM

## 2021-07-13 RX ORDER — ONDANSETRON 2 MG/ML
4 INJECTION INTRAMUSCULAR; INTRAVENOUS EVERY 6 HOURS PRN
Status: CANCELLED | OUTPATIENT
Start: 2021-07-13

## 2021-07-13 RX ORDER — MAGNESIUM SULFATE HEPTAHYDRATE 40 MG/ML
4 INJECTION, SOLUTION INTRAVENOUS AS NEEDED
Status: DISCONTINUED | OUTPATIENT
Start: 2021-07-13 | End: 2021-07-14 | Stop reason: HOSPADM

## 2021-07-13 RX ORDER — MAGNESIUM SULFATE HEPTAHYDRATE 40 MG/ML
2 INJECTION, SOLUTION INTRAVENOUS AS NEEDED
Status: DISCONTINUED | OUTPATIENT
Start: 2021-07-13 | End: 2021-07-14 | Stop reason: HOSPADM

## 2021-07-13 RX ORDER — LIDOCAINE HYDROCHLORIDE 10 MG/ML
INJECTION, SOLUTION EPIDURAL; INFILTRATION; INTRACAUDAL; PERINEURAL AS NEEDED
Status: DISCONTINUED | OUTPATIENT
Start: 2021-07-13 | End: 2021-07-13 | Stop reason: HOSPADM

## 2021-07-13 RX ORDER — HYDROCODONE BITARTRATE AND ACETAMINOPHEN 7.5; 325 MG/1; MG/1
1 TABLET ORAL EVERY 4 HOURS PRN
Status: DISCONTINUED | OUTPATIENT
Start: 2021-07-13 | End: 2021-07-14 | Stop reason: HOSPADM

## 2021-07-13 RX ORDER — MIDAZOLAM HYDROCHLORIDE 1 MG/ML
INJECTION INTRAMUSCULAR; INTRAVENOUS AS NEEDED
Status: DISCONTINUED | OUTPATIENT
Start: 2021-07-13 | End: 2021-07-13 | Stop reason: HOSPADM

## 2021-07-13 RX ORDER — ACETAMINOPHEN 325 MG/1
650 TABLET ORAL EVERY 4 HOURS PRN
Status: DISCONTINUED | OUTPATIENT
Start: 2021-07-13 | End: 2021-07-14 | Stop reason: HOSPADM

## 2021-07-13 RX ORDER — HEPARIN SODIUM 1000 [USP'U]/ML
5000 INJECTION, SOLUTION INTRAVENOUS; SUBCUTANEOUS ONCE
Status: COMPLETED | OUTPATIENT
Start: 2021-07-13 | End: 2021-07-13

## 2021-07-13 RX ORDER — SODIUM CHLORIDE 0.9 % (FLUSH) 0.9 %
1-10 SYRINGE (ML) INJECTION AS NEEDED
Status: CANCELLED | OUTPATIENT
Start: 2021-07-13

## 2021-07-13 RX ORDER — MAGNESIUM SULFATE HEPTAHYDRATE 40 MG/ML
2 INJECTION, SOLUTION INTRAVENOUS
Status: COMPLETED | OUTPATIENT
Start: 2021-07-13 | End: 2021-07-13

## 2021-07-13 RX ORDER — SODIUM CHLORIDE 9 MG/ML
75 INJECTION, SOLUTION INTRAVENOUS CONTINUOUS
Status: ACTIVE | OUTPATIENT
Start: 2021-07-13 | End: 2021-07-13

## 2021-07-13 RX ORDER — HEPARIN SODIUM 1000 [USP'U]/ML
6000 INJECTION, SOLUTION INTRAVENOUS; SUBCUTANEOUS ONCE
Status: COMPLETED | OUTPATIENT
Start: 2021-07-13 | End: 2021-07-13

## 2021-07-13 RX ORDER — SODIUM CHLORIDE 9 MG/ML
INJECTION, SOLUTION INTRAVENOUS CONTINUOUS PRN
Status: COMPLETED | OUTPATIENT
Start: 2021-07-13 | End: 2021-07-13

## 2021-07-13 RX ORDER — NITROGLYCERIN 0.4 MG/1
0.4 TABLET SUBLINGUAL
Status: CANCELLED | OUTPATIENT
Start: 2021-07-13

## 2021-07-13 RX ADMIN — BUPROPION HYDROCHLORIDE 300 MG: 150 TABLET, FILM COATED, EXTENDED RELEASE ORAL at 08:52

## 2021-07-13 RX ADMIN — HEPARIN SODIUM 6000 UNITS: 1000 INJECTION, SOLUTION INTRAVENOUS; SUBCUTANEOUS at 03:33

## 2021-07-13 RX ADMIN — SODIUM CHLORIDE 75 ML/HR: 9 INJECTION, SOLUTION INTRAVENOUS at 19:24

## 2021-07-13 RX ADMIN — IOPAMIDOL 85 ML: 755 INJECTION, SOLUTION INTRAVENOUS at 21:19

## 2021-07-13 RX ADMIN — MAGNESIUM SULFATE HEPTAHYDRATE 2 G: 2 INJECTION, SOLUTION INTRAVENOUS at 01:58

## 2021-07-13 RX ADMIN — HEPARIN SODIUM 12 UNITS/KG/HR: 10000 INJECTION, SOLUTION INTRAVENOUS at 13:54

## 2021-07-13 RX ADMIN — HYDROCHLOROTHIAZIDE 25 MG: 25 TABLET ORAL at 08:52

## 2021-07-13 RX ADMIN — ONDANSETRON 4 MG: 2 INJECTION INTRAMUSCULAR; INTRAVENOUS at 17:55

## 2021-07-13 RX ADMIN — LAMOTRIGINE 200 MG: 100 TABLET ORAL at 08:51

## 2021-07-13 RX ADMIN — INSULIN LISPRO 2 UNITS: 100 INJECTION, SOLUTION INTRAVENOUS; SUBCUTANEOUS at 08:52

## 2021-07-13 RX ADMIN — FLUOXETINE HYDROCHLORIDE 20 MG: 20 CAPSULE ORAL at 08:52

## 2021-07-13 RX ADMIN — HEPARIN SODIUM 5000 UNITS: 1000 INJECTION INTRAVENOUS; SUBCUTANEOUS at 10:49

## 2021-07-13 RX ADMIN — SODIUM CHLORIDE, PRESERVATIVE FREE 10 ML: 5 INJECTION INTRAVENOUS at 10:28

## 2021-07-13 RX ADMIN — ACETAMINOPHEN 650 MG: 325 TABLET ORAL at 08:56

## 2021-07-13 RX ADMIN — MAGNESIUM SULFATE HEPTAHYDRATE 2 G: 2 INJECTION, SOLUTION INTRAVENOUS at 03:36

## 2021-07-13 NOTE — PROGRESS NOTES
[facility] Medicine Services  PROGRESS NOTE    Patient Name: Joselito Chowdary III  : 1960  MRN: [id number]    Date of Admission: 2021  Primary Care Physician: Meme Mccollum MD    Subjective   Subjective     CC:  F/u chest pain, exertional dypsnea    HPI:  Patient resting in bed. No further chest pain since admission. No SOA Remains on heparin gtt. Cardiac evaluation planned today.    ROS:  Gen- No fevers, chills  CV- No chest pain, palpitations  Resp- No cough, dyspnea  GI- No N/V/D, abd pain    Objective   Objective     Vital Signs:   Temp:  [96.3 °F (35.7 °C)-99.1 °F (37.3 °C)] 97.6 °F (36.4 °C)  Heart Rate:  [67-96] 72  Resp:  [20-22] 22  BP: (145-191)/(71-96) 157/82        Physical Exam:  Constitutional: No acute distress, awake, alert, obese male  HENT: NCAT, mucous membranes moist  Respiratory: Clear to auscultation bilaterally, respiratory effort normal   Cardiovascular: RRR, no murmurs, rubs, or gallops  Gastrointestinal: Positive bowel sounds, soft, nontender, nondistended  Musculoskeletal: mild bilateral ankle edema  Psychiatric: Appropriate affect, cooperative  Neurologic: Oriented x 3, strength symmetric in all extremities, Cranial Nerves grossly intact to confrontation, speech clear  Skin: No rashes      Results Reviewed:  Results from last 7 days   Lab Units 21  1624   WBC 10*3/mm3 6.25  --  8.23   HEMOGLOBIN g/dL 13.4  --  13.7   HEMATOCRIT % 41.3  --  41.4   PLATELETS 10*3/mm3 214  --  239   INR   --  0.97  --    PROCALCITONIN ng/mL  --  0.10  --      Results from last 7 days   Lab Units 21  02121  2341 21  1624   SODIUM mmol/L 134*  --  137 142   POTASSIUM mmol/L 3.7  --  3.8 3.9   CHLORIDE mmol/L 101  --  101 107   CO2 mmol/L 26.0  --  25.0 25.0   BUN mg/dL 12  --  12 13   CREATININE mg/dL 1.00  --  0.95 0.98   GLUCOSE mg/dL 229*  --  120* 147*   CALCIUM mg/dL 8.5*  --  8.8 8.5*    ALT (SGPT) U/L  --   --   --  29   AST (SGOT) U/L  --   --   --  22   TROPONIN T ng/mL  --  <0.010 <0.010 <0.010   PROBNP pg/mL  --   --   --  508.6     Estimated Creatinine Clearance: 124.9 mL/min (by C-G formula based on SCr of 1 mg/dL).    Microbiology Results Abnormal     Procedure Component Value - Date/Time    COVID PRE-OP / PRE-PROCEDURE SCREENING ORDER (NO ISOLATION) - Swab, Nasopharynx [[id number]]  (Normal) Collected: 07/12/21 2035    Lab Status: Final result Specimen: Swab from Nasopharynx Updated: 07/12/21 2110    Narrative:      The following orders were created for panel order COVID PRE-OP / PRE-PROCEDURE SCREENING ORDER (NO ISOLATION) - Swab, Nasopharynx.  Procedure                               Abnormality         Status                     ---------                               -----------         ------                     COVID-19 and FLU A/B PCR...[[id number]]  Normal              Final result                 Please view results for these tests on the individual orders.    COVID-19 and FLU A/B PCR - Swab, Nasopharynx [[id number]]  (Normal) Collected: 07/12/21 2035    Lab Status: Final result Specimen: Swab from Nasopharynx Updated: 07/12/21 2110     COVID19 Not Detected     Influenza A PCR Not Detected     Influenza B PCR Not Detected    Narrative:      Fact sheet for providers: https://www.fda.gov/media/878914/download    Fact sheet for patients: https://www.fda.gov/media/592192/download    Test performed by PCR.          Imaging Results (Last 24 Hours)     Procedure Component Value Units Date/Time    XR Chest 1 View [[id number]] Collected: 07/12/21 1757     Updated: 07/13/21 0857    Narrative:      EXAMINATION: XR CHEST 1 VW- 07/12/2021     INDICATION: Chest Pain triage protocol      COMPARISON: 10/28/2011     FINDINGS: Portable chest reveals heart to be borderline enlarged.  Underlying chronic changes seen within the lung fields bilaterally. No  focal parenchymal opacification present. No  pleural effusion or  pneumothorax. Bony structures are unremarkable. Pulmonary vascularity is  within normal limits.          Impression:      No acute cardiopulmonary disease.     D:  07/12/2021  E:  07/13/2021                Results for orders placed during the hospital encounter of 07/12/21    Adult Transthoracic Echo Complete W/ Cont if Necessary Per Protocol    Interpretation Summary  · Normal LV systolic function  · Mild MR      I have reviewed the medications:  Scheduled Meds:atorvastatin, 80 mg, Oral, Nightly  buPROPion XL, 300 mg, Oral, Daily  FLUoxetine, 20 mg, Oral, Daily  hydroCHLOROthiazide, 25 mg, Oral, Daily  insulin lispro, 0-9 Units, Subcutaneous, 4x Daily With Meals & Nightly  lamoTRIgine, 200 mg, Oral, Daily  sodium chloride, 10 mL, Intravenous, Q12H      Continuous Infusions:heparin, 12 Units/kg/hr, Last Rate: 12 Units/kg/hr (07/13/21 1028)  Pharmacy to Dose Heparin,       PRN Meds:.•  acetaminophen **OR** acetaminophen **OR** acetaminophen  •  dextrose  •  dextrose  •  glucagon (human recombinant)  •  magnesium sulfate **OR** magnesium sulfate **OR** magnesium sulfate  •  nitroglycerin  •  Pharmacy to Dose Heparin  •  sodium chloride  •  sodium chloride    Assessment/Plan   Assessment & Plan     Active Hospital Problems    Diagnosis  POA   • **Exertional dyspnea [R06.00]  Yes   • Dry cough [R05]  Yes   • Edema of both lower extremities [R60.0]  Yes   • Chest pain [R07.9]  Yes   • Bipolar 1 disorder (CMS/HCC) [F31.9]  Yes   • Anxiety [F41.9]  Yes   • Unstable angina (CMS/HCC) [I20.0]  Unknown   • Type 2 diabetes mellitus (CMS/HCC) [E11.9]  Yes   • Severe obstructive sleep apnea [G47.33]  Yes   • Essential hypertension [I10]  Yes   • Dyslipidemia [E78.5]  Yes      Resolved Hospital Problems   No resolved problems to display.        Brief Hospital Course to date:  Joselito Chowdary III is a 61 y.o. male w/ a hx of HTN, HLD, T2DM, GT who presented to the ED w/ c/o exertional dyspnea and chest  pain.    Exertional dyspnea with associated chest pain   BLE edema   - Cardiology planning C today +/- PCA  -CXR without acute findings, would consider CTA if LHC negative  -ECHO showing normal LV function with mild MR  -heparin infusion started in ED, continue for now     HTN  HLD   -high dose statin  -continue losartan/HCTZ; routine BB held for now      T2DM  -hem a1c 6.8%, wife reports recent adjustment in insulin due to hypoglycemic episodes. Followed by endocrinology outpatient.  -holding routine metformin, Novolog, Januvia, Victoza and tresiba  -FSBG q ac/hs w/ sliding scale      Bipolar disorder  Anxiety  -continue Lamictal, Prozac and Wellbutrin      DVT prophylaxis:  Heparin     DVT prophylaxis:  Medical and mechanical DVT prophylaxis orders are present.       Disposition: I expect the patient to be discharged 1-2 days    CODE STATUS:   Code Status and Medical Interventions:   Ordered at: 07/13/21 0710     Code Status:    CPR     Medical Interventions (Level of Support Prior to Arrest):    Full       Caprice Judd, DO  07/13/21

## 2021-07-13 NOTE — PLAN OF CARE
Goal Outcome Evaluation:              Outcome Summary: Pt's VSS, RA, NSR. Pt's chest pain resolved overnight. NPO since midnight. Continue POC.

## 2021-07-13 NOTE — H&P
"    [facility] Medicine Services  HISTORY AND PHYSICAL    Patient Name: Joselito Chowdary III  : 1960  MRN: [id number]  Primary Care Physician: Meme Mccollum MD  Date of admission: 2021    Subjective   Subjective     Chief Complaint:  Exertional dyspnea, chest pain     HPI:  Joselito Chowdary III is a 61 y.o. male w/ a hx of HTN, HLD, T2DM, GT who presented to the ED w/ c/o exertional dyspnea and chest pain.  Pt c/o exertional dyspnea x 5 days. At times he experiences exertional dyspnea w/ minimal exertion like today when he ambulated to his mailbox and became very short of breath which is unusual for him. Pt reports that over the past 5 days, he has experienced intermittent chest pain when he becomes really short of breath. The chest pain is located central chest w/ no radiation. He reports nausea w/ one of the chest pain episodes. At worst chest pain rated 3-4/10. Pt reports that he has not experienced the chest pain unless he is \"very short of breath after exertion\"; denies chest pain @ rest. Pt also c/o a non-productive cough x 2-3 weeks and BLE edema x 2 weeks.   Pt w/ remote hx of cardiac cath and stress test in ; the stress was \"abnormal\" but ultimately the cath was \"normal\". Previously seen by Dr. Barajas, but no longer follows w/ Cardiology routinely.   Pt denies fever/chills, palpitations, V/D, abdominal pain, dysuria, syncope, confusion.   Pt evaluated in the ED. Initial troponin WNL. EKG stable. CXR without acute findings. Pt admitted to the hospital medicine service for further evaluation.     COVID Details:    Symptoms:    [] NONE [] Fever [x]  Cough [x] Shortness of breath [] Change in taste/smell      The patient has a COVID HM Topic on their chart, and they are fully vaccinated.    Review of Systems   Constitutional: Negative.  Negative for chills, diaphoresis, fatigue and fever.   HENT: Negative.  Negative for congestion, rhinorrhea, sinus pressure, " sinus pain, sneezing, sore throat and trouble swallowing.    Eyes: Negative.  Negative for visual disturbance.   Respiratory: Positive for cough and shortness of breath. Negative for wheezing and stridor.         Exertional dyspnea. Non-productive cough x 2-3 weeks.    Cardiovascular: Positive for chest pain and leg swelling. Negative for palpitations.        Exertional dyspnea w/ associated chest pain. BLE x ~2 weeks.    Gastrointestinal: Positive for nausea. Negative for abdominal distention, abdominal pain, blood in stool, constipation, diarrhea and vomiting.        Associated nausea w/ chest pain.    Endocrine: Negative.    Genitourinary: Negative.  Negative for decreased urine volume, difficulty urinating, dysuria, flank pain, frequency, hematuria and urgency.   Musculoskeletal: Negative.  Negative for arthralgias, myalgias, neck pain and neck stiffness.   Skin: Negative.  Negative for wound.   Allergic/Immunologic: Negative.  Negative for immunocompromised state.   Neurological: Negative.  Negative for dizziness, tremors, seizures, syncope, facial asymmetry, speech difficulty, weakness, light-headedness, numbness and headaches.   Hematological: Negative.  Does not bruise/bleed easily.   Psychiatric/Behavioral: Negative.  Negative for confusion.   All other systems reviewed and are negative.     Personal History     Past Medical History:   Diagnosis Date   • Bipolar 1 disorder (CMS/HCC)    • Diabetes mellitus (CMS/HCC)    • Diverticulitis    • Hyperlipidemia    • Hypertension    • Mood disorder (CMS/HCC)    • Respiratory infection        Past Surgical History:   Procedure Laterality Date   • CARDIAC CATHETERIZATION  2012   • CHOLECYSTECTOMY     • COLONOSCOPY     • GALLBLADDER SURGERY     • SHOULDER SURGERY Right     REPAIR        Family History: family history includes COPD in his father; Cancer in his mother; Deep vein thrombosis in his mother; Diabetes in his father; Heart disease in his father; Heart failure  in his father; Hypertension in his father and mother. Otherwise pertinent FHx was reviewed and unremarkable.     Social History:  reports that he quit smoking about 35 years ago. His smoking use included cigarettes. He started smoking about 43 years ago. He has a 8.00 pack-year smoking history. He has never used smokeless tobacco. He reports that he does not drink alcohol and does not use drugs.  Social History     Social History Narrative    WIFE, SLADE NOWAK, DIRECTOR OF         Medications:  FLUoxetine, Liraglutide, NON FORMULARY, SITagliptin, albuterol, aspirin, atorvastatin, buPROPion XL, hydroCHLOROthiazide, insulin aspart, insulin degludec, lamoTRIgine, loratadine, losartan-hydrochlorothiazide, metFORMIN, ondansetron ODT, potassium chloride, and propranolol    Allergies   Allergen Reactions   • Zestoretic [Lisinopril-Hydrochlorothiazide] Cough       Objective   Objective     Vital Signs:   Temp:  [96.3 °F (35.7 °C)-99.1 °F (37.3 °C)] 96.9 °F (36.1 °C)  Heart Rate:  [67-96] 67  Resp:  [20-22] 22  BP: (145-191)/(71-96) 154/83    Physical Exam     Constitutional: Awake, alert; non-toxic appearing   Eyes: PERRLA, sclerae anicteric, no conjunctival injection  HENT: NCAT, mucous membranes moist  Neck: Supple, no thyromegaly, no lymphadenopathy, trachea midline  Respiratory: Clear to auscultation bilaterally, nonlabored respirations   Cardiovascular: RRR, no murmurs, rubs, or gallops, +1-2 pitting edema BLE   Gastrointestinal: Positive bowel sounds, soft, nontender, nondistended  Musculoskeletal: Normal ROM bilaterally   Psychiatric: Appropriate affect, cooperative  Neurologic: Oriented x 3, strength symmetric in all extremities, Cranial Nerves grossly intact to confrontation, speech clear  Skin: No rashes, lesions or wounds     Results Reviewed:  I have personally reviewed most recent indicated data and agree with findings including:  [x]  Laboratory  [x]  Radiology  [x]  EKG/Telemetry  []  Pathology  []   Cardiac/Vascular Studies  []  Old records  []  Other:  Most pertinent findings include:    LAB RESULTS:      Lab 07/12/21 1958 07/12/21  1625 07/12/21  1624   WBC  --   --  8.23   HEMOGLOBIN  --   --  13.7   HEMATOCRIT  --   --  41.4   PLATELETS  --   --  239   NEUTROS ABS  --   --  5.17   IMMATURE GRANS (ABS)  --   --  0.03   LYMPHS ABS  --   --  2.09   MONOS ABS  --   --  0.56   EOS ABS  --   --  0.33   MCV  --   --  90.8   PROCALCITONIN 0.10  --   --    LACTATE  --  1.4  --    PROTIME 12.6  --   --    APTT 27.5*  --   --    HEPARIN ANTI-XA 0.10*  --   --    D DIMER QUANT 0.31  --   --          Lab 07/12/21 1958 07/12/21 1624   SODIUM 137 142   POTASSIUM 3.8 3.9   CHLORIDE 101 107   CO2 25.0 25.0   ANION GAP 11.0 10.0   BUN 12 13   CREATININE 0.95 0.98   GLUCOSE 120* 147*   CALCIUM 8.8 8.5*   MAGNESIUM 2.0  --    HEMOGLOBIN A1C  --  6.80*         Lab 07/12/21  1624   TOTAL PROTEIN 6.0   ALBUMIN 3.70   GLOBULIN 2.3   ALT (SGPT) 29   AST (SGOT) 22   BILIRUBIN 0.4   ALK PHOS 73   LIPASE 39         Lab 07/12/21 1958 07/12/21  1624   PROBNP  --  508.6   TROPONIN T <0.010 <0.010   PROTIME 12.6  --    INR 0.97  --                  Brief Urine Lab Results     None        Microbiology Results (last 10 days)     Procedure Component Value - Date/Time    COVID PRE-OP / PRE-PROCEDURE SCREENING ORDER (NO ISOLATION) - Swab, Nasopharynx [[id number]]  (Normal) Collected: 07/12/21 2035    Lab Status: Final result Specimen: Swab from Nasopharynx Updated: 07/12/21 2110    Narrative:      The following orders were created for panel order COVID PRE-OP / PRE-PROCEDURE SCREENING ORDER (NO ISOLATION) - Swab, Nasopharynx.  Procedure                               Abnormality         Status                     ---------                               -----------         ------                     COVID-19 and FLU A/B PCR...[[id number]]  Normal              Final result                 Please view results for these tests on the individual  "orders.    COVID-19 and FLU A/B PCR - Swab, Nasopharynx [[id number]]  (Normal) Collected: 07/12/21 2035    Lab Status: Final result Specimen: Swab from Nasopharynx Updated: 07/12/21 2110     COVID19 Not Detected     Influenza A PCR Not Detected     Influenza B PCR Not Detected    Narrative:      Fact sheet for providers: https://www.fda.gov/media/362012/download    Fact sheet for patients: https://www.fda.gov/media/344071/download    Test performed by PCR.          XR Chest 1 View    Result Date: 7/12/2021   EXAMINATION: XR CHEST 1 VW-  INDICATION: Chest Pain triage protocol  COMPARISON: 10/20/2011  FINDINGS: Portable chest reveals heart to be borderline enlarged. Underlying chronic changes in with the lung fields bilaterally. No focal right opacification present. A pleural effusion or pneumothorax. Bony structures are unremarkable. Pulmonary vascularity is within normal limits.         Impression: No acute cardiopulmonary disease.             Assessment/Plan   Assessment & Plan       Exertional dyspnea    Severe obstructive sleep apnea    Type 2 diabetes mellitus (CMS/HCC)    Essential hypertension    Dyslipidemia    Dry cough    Edema of both lower extremities    Chest pain    Bipolar 1 disorder (CMS/HCC)    Anxiety    Joselito Chowdary III is a 61 y.o. male w/ a hx of HTN, HLD, T2DM, GT who presented to the ED w/ c/o exertional dyspnea and chest pain.    **Exertional dyspnea w/ associated chest pain   **Non-productive cough  **BLE edema   -exertional dyspnea w/ intermittent associated chest pain x 5 days; dry cough x 3 weeks and BLE edema x 2 weeks  -remote stress and cath in 2012; cath was \"normal\" per pt' previously followed w/ Dr. Barajas but no longer follows w/ Cardiology routinely   -currently on room air; 97% saturated; O2 PRN   -initial troponin and repeat troponin <0.010; trend   -EKG stable; trend   -proBNP 508.6  -CXR without acute findings  -ECHO pending   -D.Dimer WNL  -s/p ASA 324mg given in " ED  -heparin infusion started in ED; continue per protocol pending Cardiology recommendations  -high dose statin   -PRN nitrostat   -NPO after MN  -lipid panel, tsh- pending; hem a1c 6.8%  -Cardiology consult in am     **HTN  **HLD   -high dose statin; lipid panel in am   -continue losartan/HCTZ; routine BB held for now     **T2DM  -hem a1c 6.8%  -holding routine metformin, Novolog, Januvia, Victoza and tresiba  -FSBG q ac/hs w/ sliding scale (NPO after MN tonight; no long acting ordered for now)     **Bipolar disorder  **Anxiety  -continue Lamictal, Prozac and Wellbutrin     DVT prophylaxis:  Heparin     CODE STATUS:    Code Status and Medical Interventions:   Ordered at: 07/13/21 0710     Code Status:    CPR     Medical Interventions (Level of Support Prior to Arrest):    Full     This note has been completed as part of a split-shared workflow.     Signature: Electronically signed by MELVIN Maldonado, 07/12/21, 10:58 PM EDT.      Attending   Admission Attestation       I have seen and examined the patient, performing an independent face-to-face diagnostic evaluation with plan of care reviewed and developed with the advanced practice clinician (APC).      Brief Summary Statement:   Joselito Chowdary III is a 61 y.o. male with PMHx of HTN, HLD, T2DM, and GT who presented to [facility] ED with c/o chest pain and dyspnea with exertion the past 5 days.  Patient complains of shortness of breath when ambulating only short distances.  States that he is chest pain that is associated with shortness of breath and nausea. The pt states that there is no radiation of pain but does report increased pedal edema. We will admit pt to telemetry and obtain 2D Echo and consult cardiology in the am.     Remainder of detailed HPI is as noted by APC and has been reviewed and/or edited by me for completeness.    Attending Physical Exam:  Constitutional: Awake, alert  Eyes: PERRLA, sclerae anicteric, no conjunctival injection  HENT: NCAT,  mucous membranes moist  Neck: Supple, no thyromegaly, no lymphadenopathy, trachea midline  Respiratory: Clear to auscultation bilaterally, nonlabored respirations   Cardiovascular: RRR, no murmurs, rubs, or gallops, palpable pedal pulses bilaterally  Gastrointestinal: Positive bowel sounds, soft, nontender, nondistended  Musculoskeletal: 2 plus edema, no clubbing or cyanosis to extremities  Psychiatric: Appropriate affect, cooperative  Neurologic: Oriented x 3, strength symmetric in all extremities, Cranial Nerves grossly intact to confrontation, speech clear  Skin: No rashes      Brief Assessment/Plan :  See detailed assessment and plan developed with APC which I have reviewed and/or edited for completeness.    Admission Status: I believe that this patient meets INPATIENT status due to chest and shortness of breath.  I feel patient’s risk for adverse outcomes and need for care warrant INPATIENT evaluation and I predict the patient’s care encounter to likely last beyond 2 midnights.      Jenelle Choi,   07/13/21

## 2021-07-13 NOTE — PROGRESS NOTES
HEPARIN INFUSION  Joselito Chowdary III is a  61 y.o. male receiving heparin infusion.             Therapy for (VTE/Cardiac):   cardiac  Patient Weight: 162 kg  Initial Bolus (Y/N):   yes  Any Bolus (Y/N):   yes        Signs or Symptoms of Bleeding: no      Cardiac or Other (Not VTE)   Initial Bolus: 60 units/kg (Max 4,000 units)  Initial rate: 12 units/kg/hr (Max 1,000 units/hr)   Anti-Xa (IU/mL) Bolus Dose Stop Infusion Rate Change Repeat Anti-Xa      ?0.19 60 units/kg 0 hrs Increase rate by   4 units/kg/hr 6 hrs       0.2 - 0.29  30 units/kg 0 hrs Increase rate by 2 units/kg/hr 6 hrs    0.3 - 0.7 0 0 hrs No change 6 hrs      0.71 - 0.99 0  0 hrs Decrease rate by 2 units/kg/hr 6 hrs            ?1 0 Hold 1 hr Decrease rate by 3 units/kg/hr 6 hrs      Recommend Xa every 6 hours.             Results from last 7 days   Lab Units 07/13/21  0210 07/12/21 1958 07/12/21  1624   INR   --  0.97  --    HEMOGLOBIN g/dL 13.4  --  13.7   HEMATOCRIT % 41.3  --  41.4   PLATELETS 10*3/mm3 214  --  239          Date   Time   Anti-Xa Current Rate (Unit/kg/hr) Bolus   (Units) Rate Change   (Unit/kg/hr) New Rate (Unit/kg/hr) Next   Anti-Xa Comments  Pump Check Daily   7/12 2030 0.1 New start 4000 + 6.1 6.1 0200 Spoke with RN; bolus given and drip started @  ~ 2030 7/13 0315 0.1 6.1 6000 +3.9 10 0930 Dw RN   7/13  0929 0.24 10 5000 +2 12 1700 Parisa 3241                                                                                                                                                                                                           Gio Lawrence [facility]  7/13/2021  10:26 EDT

## 2021-07-13 NOTE — CASE MANAGEMENT/SOCIAL WORK
Discharge Planning Assessment  [facility]     Patient Name: Joselito Chowdary III  MRN: [id number]  Today's Date: 7/13/2021    Admit Date: 7/12/2021    Discharge Needs Assessment     Row Name 07/13/21 1254       Living Environment    Lives With  spouse    Current Living Arrangements  home/apartment/condo    Primary Care Provided by  self    Provides Primary Care For  no one    Family Caregiver if Needed  spouse    Quality of Family Relationships  helpful;involved;supportive    Able to Return to Prior Arrangements  yes       Resource/Environmental Concerns    Resource/Environmental Concerns  none    Transportation Concerns  car, none       Transition Planning    Patient/Family Anticipates Transition to  home;home with family    Patient/Family Anticipated Services at Transition  none    Transportation Anticipated  family or friend will provide       Discharge Needs Assessment    Readmission Within the Last 30 Days  no previous admission in last 30 days    Equipment Currently Used at Home  respiratory supplies;glucometer    Concerns to be Addressed  discharge planning    Anticipated Changes Related to Illness  none        Discharge Plan     Row Name 07/13/21 2705       Plan    Plan  Initial CM eval    Patient/Family in Agreement with Plan  yes    Plan Comments  Patient lives with his spouse in [facility]. He is independent of ADL's and has no current DME needs. Plan per MD is to undergo a heart cath today. CM will continue to follow for discharge planning - goal is to return home with spouse at discharge - petye [phone]    Final Discharge Disposition Code  01 - home or self-care        Continued Care and Services - Admitted Since 7/12/2021    Coordination has not been started for this encounter.         Demographic Summary     Row Name 07/13/21 1249       General Information    Arrived From  home    Referral Source  admission list    Reason for Consult  discharge planning    Preferred Language  English        Contact Information    Permission Granted to Share Info With          Functional Status     Row Name 07/13/21 1249       Functional Status    Usual Activity Tolerance  good    Current Activity Tolerance  moderate       Functional Status, IADL    Medications  independent    Meal Preparation  independent    Housekeeping  independent    Laundry  independent    Shopping  independent        Psychosocial    No documentation.       Abuse/Neglect    No documentation.       Legal    No documentation.       Substance Abuse    No documentation.       Patient Forms    No documentation.           Marissa Jones RN

## 2021-07-13 NOTE — PLAN OF CARE
Goal Outcome Evaluation:         Pt VSS on RA. Patient ambulated in room and sat up in chair for majority of the shift. Had heart cath preformed through left radial artery with no interventions needed. Patients heparin drip will not be restarted per MD Murry. No other issues at this time, will continue to monitor.

## 2021-07-13 NOTE — CONSULTS
[facility]   Consult Note    Referring Provider: Dr. Choi    Reason for Consultation: angina    Patient Care Team:  Meme Mccollum MD as PCP - General (Internal Medicine)     Problem List:  1. Angina  1. [facility] 2012 normal per patient report.   2. Hypertension  3. Dyslipidemia  4. Diabetes mellitus  1. Diagnosed in his 30's  5. Morbid obesity  6. Mild asthma  7. Sleep apnea on CPAP  8. Bipolar disorder  9. Diverticulosis  10. Surgeries:  1. Cholecystectomy  2. Right shoulder repair          Allergies   Allergen Reactions   • Zestoretic [Lisinopril-Hydrochlorothiazide] Cough           Current Facility-Administered Medications:   •  acetaminophen (TYLENOL) tablet 650 mg, 650 mg, Oral, Q4H PRN, 650 mg at 07/13/21 0856 **OR** acetaminophen (TYLENOL) 160 MG/5ML solution 650 mg, 650 mg, Oral, Q4H PRN **OR** acetaminophen (TYLENOL) suppository 650 mg, 650 mg, Rectal, Q4H PRN, Jenelle Choi DO  •  atorvastatin (LIPITOR) tablet 80 mg, 80 mg, Oral, Nightly, Jenelle Choi DO, 80 mg at 07/12/21 2249  •  buPROPion XL (WELLBUTRIN XL) 24 hr tablet 300 mg, 300 mg, Oral, Daily, Fani Mclean APRN, 300 mg at 07/13/21 0852  •  dextrose (D50W) 25 g/ 50mL Intravenous Solution 25 g, 25 g, Intravenous, Q15 Min PRN, Fani Mclean APRN  •  dextrose (GLUTOSE) oral gel 15 g, 15 g, Oral, Q15 Min PRN, Fani Mclean APRN  •  FLUoxetine (PROzac) capsule 20 mg, 20 mg, Oral, Daily, Fani Mclean APRN, 20 mg at 07/13/21 0852  •  glucagon (human recombinant) (GLUCAGEN DIAGNOSTIC) injection 1 mg, 1 mg, Subcutaneous, Q15 Min PRN, Fani Mclean APRN  •  heparin 04293 units/250 mL (100 units/mL) in 0.45 % NaCl infusion, 10 Units/kg/hr, Intravenous, Titrated, Sinan García IV, PharmD, Last Rate: 16.2 mL/hr at 07/13/21 0623, 10 Units/kg/hr at 07/13/21 0623  •  hydroCHLOROthiazide (HYDRODIURIL) tablet 25 mg, 25 mg, Oral, Daily, Fani Mclean APRN, 25 mg at 07/13/21 0852  •  insulin lispro (humaLOG)  injection 0-9 Units, 0-9 Units, Subcutaneous, 4x Daily With Meals & Nightly, Fani Mclean, MELVIN, 2 Units at 07/13/21 0852  •  lamoTRIgine (LaMICtal) tablet 200 mg, 200 mg, Oral, Daily, Jenelle Choi DO, 200 mg at 07/13/21 0851  •  Magnesium Sulfate 2 gram Bolus, followed by 8 gram infusion (total Mg dose 10 grams)- Mg less than or equal to 1mg/dL, 2 g, Intravenous, PRN **OR** Magnesium Sulfate 2 gram / 50mL Infusion (GIVE X 3 BAGS TO EQUAL 6GM TOTAL DOSE) - Mg 1.1 - 1.5 mg/dl, 2 g, Intravenous, PRN **OR** Magnesium Sulfate 4 gram infusion- Mg 1.6-1.9 mg/dL, 4 g, Intravenous, PRN, Kiley Meraz PA  •  nitroglycerin (NITROSTAT) ointment 0.5 inch, 0.5 inch, Topical, BID PRN, Jenelle Choi DO  •  Pharmacy to Dose Heparin, , Does not apply, Continuous PRN, Juliocesar De La Paz PA  •  sodium chloride 0.9 % flush 10 mL, 10 mL, Intravenous, PRN, Ramiro Guan MD  •  sodium chloride 0.9 % flush 10 mL, 10 mL, Intravenous, Q12H, Jenelle Choi DO, 10 mL at 07/12/21 2250  •  sodium chloride 0.9 % flush 10 mL, 10 mL, Intravenous, PRN, Jenelle Choi DO    heparin, 10 Units/kg/hr, Last Rate: 10 Units/kg/hr (07/13/21 0623)  Pharmacy to Dose Heparin,         Medications Prior to Admission   Medication Sig Dispense Refill Last Dose   • albuterol (PROAIR RESPICLICK) 108 (90 Base) MCG/ACT inhaler ProAir HFA 90 mcg/actuation aerosol inhaler   Every six hours      • atorvastatin (LIPITOR) 40 MG tablet atorvastatin 40 mg tablet   TAKE 1 TABLET BY MOUTH ONE TIME A DAY      • buPROPion XL (WELLBUTRIN XL) 300 MG 24 hr tablet    7/11/2021 at Unknown time   • FLUoxetine (PROzac) 20 MG capsule Prozac 20 mg capsule   Take 1 capsule every day by oral route.   7/11/2021 at Unknown time   • hydroCHLOROthiazide (HYDRODIURIL) 25 MG tablet hydrochlorothiazide 25 mg tablet   TAKE 1 TABLET BY MOUTH ONE TIME A DAY      • lamoTRIgine (LaMICtal) 200 MG tablet       • loratadine (CLARITIN) 10 MG tablet loratadine 10 mg tablet   Take 1  tablet every day by oral route as needed for 90 days.   Past Week at Unknown time   • losartan-hydrochlorothiazide (HYZAAR) 100-25 MG per tablet Take 1 tablet by mouth Daily. 200001 5    • metFORMIN (GLUCOPHAGE) 500 MG tablet       • NON FORMULARY Gave a different kind of cholesterol medication      • NOVOLOG FLEXPEN 100 UNIT/ML solution pen-injector sc pen    7/12/2021 at Unknown time   • ondansetron ODT (ZOFRAN-ODT) 8 MG disintegrating tablet Every 8 (Eight) Hours.      • potassium chloride (K-DUR) 10 MEQ CR tablet       • propranolol (INDERAL) 10 MG tablet Every 12 (Twelve) Hours.      • SITagliptin (JANUVIA) 25 MG tablet Take 25 mg by mouth Daily.   7/11/2021 at Unknown time   • TRESIBA FLEXTOUCH 100 UNIT/ML solution pen-injector injection 90 Units.  8 7/11/2021 at Unknown time   • VICTOZA 18 MG/3ML solution pen-injector    7/12/2021 at Unknown time   • aspirin 81 MG EC tablet Take 81 mg by mouth Daily.            Subjective .   History of present illness:    Patient is a 61-year-old  male who we are asked to see today for further evaluation of exertional angina.  Patient has no previous history of obstructive coronary disease.  He has multiple risk factors including hypertension, dyslipidemia, diabetes, morbid obesity, and family history of coronary disease.  Patient notes that he has been in his usual state of health up until the last week or so.  Has noted significant increase in shortness of breath.  Notes that simply walking from his mailbox to the house he became significantly short of breath with associated midsternal chest discomfort.  This has been alleviated with rest.  He has also been noticing significant shortness of breath walking up his stairs at home which is new within the last week.  No reported syncope or near syncope.  Does feel that his lower extremities are more swollen than normal.  He does take a significant amount of HCTZ.  Due to the rapid progression and easiness to bring on  his discomfort he presented to the hospital for further evaluation.  Patient and wife note that previously his diabetes has been uncontrolled until recently with hemoglobin A1c's as high as 12.  Has recently been more compliant with medications but in the past has been somewhat noncompliant.  Patient denies any dyspnea at rest.      Social History     Socioeconomic History   • Marital status:      Spouse name: Not on file   • Number of children: Not on file   • Years of education: Not on file   • Highest education level: Not on file   Tobacco Use   • Smoking status: Former Smoker     Packs/day: 1.00     Years: 8.00     Pack years: 8.00     Types: Cigarettes     Start date:      Quit date:      Years since quittin.5   • Smokeless tobacco: Never Used   Vaping Use   • Vaping Use: Never used   Substance and Sexual Activity   • Alcohol use: Never   • Drug use: Never   • Sexual activity: Defer     Family History   Problem Relation Age of Onset   • Cancer Mother    • Hypertension Mother    • Deep vein thrombosis Mother    • Diabetes Father    • Heart disease Father    • Hypertension Father    • COPD Father    • Heart failure Father          Review of Systems:  Review of Systems   Constitutional: Positive for malaise/fatigue and weight gain. Negative for fever.   HENT: Negative for nosebleeds.    Eyes: Negative for redness and visual disturbance.   Cardiovascular: Positive for chest pain, dyspnea on exertion and leg swelling. Negative for orthopnea, palpitations and paroxysmal nocturnal dyspnea.   Respiratory: Positive for cough (chronic) and shortness of breath. Negative for snoring, sputum production and wheezing.    Hematologic/Lymphatic: Negative for bleeding problem.   Skin: Negative for flushing, itching and rash.   Musculoskeletal: Positive for arthritis. Negative for falls, joint pain and muscle cramps.   Gastrointestinal: Negative for abdominal pain, diarrhea, heartburn, nausea and vomiting.  "  Genitourinary: Negative for hematuria.   Neurological: Positive for excessive daytime sleepiness. Negative for dizziness, headaches, tremors and weakness.   Psychiatric/Behavioral: Negative for substance abuse. The patient is not nervous/anxious.         Objective   Vitals:  /83 (BP Location: Left arm, Patient Position: Lying)   Pulse 67   Temp 96.9 °F (36.1 °C) (Oral)   Resp 22   Ht 177.8 cm (70\")   Wt (!) 169 kg (373 lb 4.8 oz)   SpO2 95%   BMI 53.56 kg/m²      Intake/Output Summary (Last 24 hours) at 7/13/2021 1008  Last data filed at 7/13/2021 0210  Gross per 24 hour   Intake --   Output 475 ml   Net -475 ml       Vitals reviewed.   Constitutional:       Appearance: Well-developed and not in distress.      Comments: Morbidly obese   Neck:      Vascular: No JVD.      Trachea: No tracheal deviation.   Pulmonary:      Effort: Pulmonary effort is normal.      Breath sounds: Normal breath sounds.   Cardiovascular:      Normal rate. Regular rhythm.      Comments: Bilateral radials 2+. Right damien's negative, left damien's positive  Pulses:     Intact distal pulses.   Edema:     Peripheral edema present.     Pretibial: bilateral trace edema of the pretibial area.     Ankle: bilateral trace edema of the ankle.  Abdominal:      General: Bowel sounds are normal.      Palpations: Abdomen is soft.      Tenderness: There is no abdominal tenderness.   Musculoskeletal:         General: No deformity. Skin:     General: Skin is warm and dry.   Neurological:      Mental Status: Alert and oriented to person, place, and time.              Results Review:  I reviewed the patient's new clinical results.  Results from last 7 days   Lab Units 07/13/21  0210   WBC 10*3/mm3 6.25   HEMOGLOBIN g/dL 13.4   HEMATOCRIT % 41.3   PLATELETS 10*3/mm3 214     Results from last 7 days   Lab Units 07/13/21  0210 07/12/21  1624   SODIUM mmol/L 134* 142   POTASSIUM mmol/L 3.7 3.9   CHLORIDE mmol/L 101 107   CO2 mmol/L 26.0 25.0   BUN " mg/dL 12 13   CREATININE mg/dL 1.00 0.98   CALCIUM mg/dL 8.5* 8.5*   BILIRUBIN mg/dL  --  0.4   ALK PHOS U/L  --  73   ALT (SGPT) U/L  --  29   AST (SGOT) U/L  --  22   GLUCOSE mg/dL 229* 147*     Results from last 7 days   Lab Units 07/13/21  0210   SODIUM mmol/L 134*   POTASSIUM mmol/L 3.7   CHLORIDE mmol/L 101   CO2 mmol/L 26.0   BUN mg/dL 12   CREATININE mg/dL 1.00   GLUCOSE mg/dL 229*   CALCIUM mg/dL 8.5*     Results from last 7 days   Lab Units 07/12/21 1958   INR  0.97     Lab Results   Lab Value Date/Time    TROPONINT <0.010 07/12/2021 2341    TROPONINT <0.010 07/12/2021 1958    TROPONINT <0.010 07/12/2021 1624     Results from last 7 days   Lab Units 07/13/21  0210   TSH uIU/mL 1.360     Results from last 7 days   Lab Units 07/13/21  0210   CHOLESTEROL mg/dL 153   TRIGLYCERIDES mg/dL 200*   HDL CHOL mg/dL 39*   LDL CHOL mg/dL 80     Results from last 7 days   Lab Units 07/12/21  1624   PROBNP pg/mL 508.6           Tele:  SR    EKG: SR no acute changes      Assessment/Plan     1. Rapidly progressive angina, functional class III  2. Hypertension  3. Dyslipidemia  4. Diabetes  5. Morbid obesity  6. Obstructive sleep apnea      Plan:    1. We will proceed to cardiac catheterization plus or minus catheter-based intervention today.  This was discussed with the patient.  He verbalizes understanding and wishes to proceed.  Left radial artery as there is no right ulnar artery by pulse oximetry  2. If cardiac catheterization negative would consider evaluation for possible PE.    MELVIN Pennington obtained past medical, family history, social history, review of systems and functioned as a scribe for the remainder of the dictation for Dr. Levy.     I Lois Levy MD personally performed the services described in this documentation as scribed by the above individual in my presence, and it is both accurate and complete.    Lois Levy MD, FACC    Dictated utilizing Dragon dictation

## 2021-07-14 VITALS
HEIGHT: 70 IN | WEIGHT: 315 LBS | HEART RATE: 65 BPM | OXYGEN SATURATION: 97 % | TEMPERATURE: 97.4 F | SYSTOLIC BLOOD PRESSURE: 138 MMHG | RESPIRATION RATE: 20 BRPM | DIASTOLIC BLOOD PRESSURE: 75 MMHG | BODY MASS INDEX: 45.1 KG/M2

## 2021-07-14 LAB
ANION GAP SERPL CALCULATED.3IONS-SCNC: 9 MMOL/L (ref 5–15)
BUN SERPL-MCNC: 10 MG/DL (ref 6–20)
BUN/CREAT SERPL: 11.2 (ref 7–25)
CALCIUM SPEC-SCNC: 8.5 MG/DL (ref 8.6–10.5)
CHLORIDE SERPL-SCNC: 103 MMOL/L (ref 98–107)
CO2 SERPL-SCNC: 26 MMOL/L (ref 22–29)
CREAT SERPL-MCNC: 0.89 MG/DL (ref 0.76–1.27)
GFR SERPL CREATININE-BSD FRML MDRD: 87 ML/MIN/1.73
GLUCOSE BLDC GLUCOMTR-MCNC: 213 MG/DL (ref 70–130)
GLUCOSE BLDC GLUCOMTR-MCNC: 310 MG/DL (ref 70–130)
GLUCOSE BLDC GLUCOMTR-MCNC: 351 MG/DL (ref 70–130)
GLUCOSE SERPL-MCNC: 219 MG/DL (ref 65–99)
MAGNESIUM SERPL-MCNC: 2.1 MG/DL (ref 1.6–2.6)
POTASSIUM SERPL-SCNC: 4.2 MMOL/L (ref 3.5–5.2)
SODIUM SERPL-SCNC: 138 MMOL/L (ref 136–145)

## 2021-07-14 PROCEDURE — 63710000001 INSULIN LISPRO (HUMAN) PER 5 UNITS: Performed by: NURSE PRACTITIONER

## 2021-07-14 PROCEDURE — 63710000001 INSULIN LISPRO (HUMAN) PER 5 UNITS: Performed by: INTERNAL MEDICINE

## 2021-07-14 PROCEDURE — 99213 OFFICE O/P EST LOW 20 MIN: CPT | Performed by: INTERNAL MEDICINE

## 2021-07-14 PROCEDURE — 82962 GLUCOSE BLOOD TEST: CPT

## 2021-07-14 PROCEDURE — 99217 PR OBSERVATION CARE DISCHARGE MANAGEMENT: CPT | Performed by: INTERNAL MEDICINE

## 2021-07-14 PROCEDURE — 83735 ASSAY OF MAGNESIUM: CPT | Performed by: FAMILY MEDICINE

## 2021-07-14 PROCEDURE — 80048 BASIC METABOLIC PNL TOTAL CA: CPT | Performed by: FAMILY MEDICINE

## 2021-07-14 PROCEDURE — G0378 HOSPITAL OBSERVATION PER HR: HCPCS

## 2021-07-14 PROCEDURE — 25010000002 FUROSEMIDE PER 20 MG: Performed by: NURSE PRACTITIONER

## 2021-07-14 RX ORDER — FUROSEMIDE 40 MG/1
40 TABLET ORAL DAILY PRN
Qty: 30 TABLET | Refills: 0 | Status: SHIPPED | OUTPATIENT
Start: 2021-07-14 | End: 2021-12-08

## 2021-07-14 RX ORDER — PROPRANOLOL HYDROCHLORIDE 10 MG/1
10 TABLET ORAL EVERY 12 HOURS SCHEDULED
Status: DISCONTINUED | OUTPATIENT
Start: 2021-07-14 | End: 2021-07-14

## 2021-07-14 RX ORDER — PROPRANOLOL HYDROCHLORIDE 20 MG/1
20 TABLET ORAL EVERY 12 HOURS SCHEDULED
Qty: 60 TABLET | Refills: 0 | Status: SHIPPED | OUTPATIENT
Start: 2021-07-14 | End: 2022-10-31

## 2021-07-14 RX ORDER — FUROSEMIDE 10 MG/ML
40 INJECTION INTRAMUSCULAR; INTRAVENOUS ONCE
Status: COMPLETED | OUTPATIENT
Start: 2021-07-14 | End: 2021-07-14

## 2021-07-14 RX ORDER — PROPRANOLOL HYDROCHLORIDE 20 MG/1
20 TABLET ORAL EVERY 12 HOURS SCHEDULED
Status: DISCONTINUED | OUTPATIENT
Start: 2021-07-14 | End: 2021-07-14 | Stop reason: HOSPADM

## 2021-07-14 RX ADMIN — SODIUM CHLORIDE, PRESERVATIVE FREE 10 ML: 5 INJECTION INTRAVENOUS at 08:05

## 2021-07-14 RX ADMIN — SODIUM CHLORIDE, PRESERVATIVE FREE 10 ML: 5 INJECTION INTRAVENOUS at 00:54

## 2021-07-14 RX ADMIN — ATORVASTATIN CALCIUM 80 MG: 40 TABLET, FILM COATED ORAL at 00:53

## 2021-07-14 RX ADMIN — FLUOXETINE HYDROCHLORIDE 20 MG: 20 CAPSULE ORAL at 08:07

## 2021-07-14 RX ADMIN — HYDROCHLOROTHIAZIDE 25 MG: 25 TABLET ORAL at 08:07

## 2021-07-14 RX ADMIN — INSULIN LISPRO 7 UNITS: 100 INJECTION, SOLUTION INTRAVENOUS; SUBCUTANEOUS at 00:53

## 2021-07-14 RX ADMIN — INSULIN LISPRO 8 UNITS: 100 INJECTION, SOLUTION INTRAVENOUS; SUBCUTANEOUS at 12:02

## 2021-07-14 RX ADMIN — LAMOTRIGINE 200 MG: 100 TABLET ORAL at 08:07

## 2021-07-14 RX ADMIN — BUPROPION HYDROCHLORIDE 300 MG: 150 TABLET, FILM COATED, EXTENDED RELEASE ORAL at 08:07

## 2021-07-14 RX ADMIN — FUROSEMIDE 40 MG: 10 INJECTION, SOLUTION INTRAMUSCULAR; INTRAVENOUS at 10:42

## 2021-07-14 RX ADMIN — PROPRANOLOL HYDROCHLORIDE 20 MG: 20 TABLET ORAL at 10:42

## 2021-07-14 RX ADMIN — INSULIN LISPRO 2 UNITS: 100 INJECTION, SOLUTION INTRAVENOUS; SUBCUTANEOUS at 08:06

## 2021-07-14 RX ADMIN — LOSARTAN POTASSIUM: 50 TABLET, FILM COATED ORAL at 10:41

## 2021-07-14 NOTE — PROGRESS NOTES
"[facility] Cardiology Daily Note       LOS: 0 days   Patient Care Team:  Meme Mccollum MD as PCP - General (Internal Medicine)    Chief Complaint:  SOA    Subjective     Subjective: no acute events overnight. No chest pain, PND, orthopnea.     Review of Systems:   As above.    Medications:  atorvastatin, 80 mg, Oral, Nightly  buPROPion XL, 300 mg, Oral, Daily  FLUoxetine, 20 mg, Oral, Daily  insulin lispro, 0-9 Units, Subcutaneous, 4x Daily With Meals & Nightly  lamoTRIgine, 200 mg, Oral, Daily  losartan-HCTZ (HYZAAR) 100-25 combo dose, , Oral, Daily  propranolol, 10 mg, Oral, Q12H  sodium chloride, 10 mL, Intravenous, Q12H        Objective     Vital Sign Min/Max for last 24 hours  Temp  Min: 96.7 °F (35.9 °C)  Max: 98.5 °F (36.9 °C)    BP  Min: 138/75  Max: 186/88   Pulse  Min: 65  Max: 80   Resp  Min: 18  Max: 22   SpO2  Min: 94 %  Max: 98 %   No data recorded   No data recorded      Intake/Output Summary (Last 24 hours) at 7/14/2021 0820  Last data filed at 7/14/2021 0730  Gross per 24 hour   Intake 747 ml   Output 343.33 ml   Net 403.67 ml        Flowsheet Rows      First Filed Value   Admission Height  177.8 cm (70\") Documented at 07/12/2021 1616   Admission Weight  (!) 162 kg (357 lb) Documented at 07/12/2021 1616          Physical Exam:    General: Alert and oriented.   Cardiovascular: Heart has a nondisplaced focal PMI. Regular rate and rhythm without murmur, gallop or rub.  Lungs: Clear without rales or wheezes. Equal expansion is noted.   Abdomen: Soft, nontender.  Extremities: Show trace edema. Left radial pulse 2+   Skin: warm and dry.     Results Review:    I reviewed the patient's new clinical results.  EKG:  Tele: NSR      Labs:    Results from last 7 days   Lab Units 07/14/21  0624 07/13/21  0210 07/12/21 1958 07/12/21  1624   SODIUM mmol/L 138 134* 137 142   POTASSIUM mmol/L 4.2 3.7 3.8 3.9   CHLORIDE mmol/L 103 101 101 107   CO2 mmol/L 26.0 26.0 25.0 25.0   BUN mg/dL 10 12 " 12 13   CREATININE mg/dL 0.89 1.00 0.95 0.98   CALCIUM mg/dL 8.5* 8.5* 8.8 8.5*   BILIRUBIN mg/dL  --   --   --  0.4   ALK PHOS U/L  --   --   --  73   ALT (SGPT) U/L  --   --   --  29   AST (SGOT) U/L  --   --   --  22   GLUCOSE mg/dL 219* 229* 120* 147*     Results from last 7 days   Lab Units 07/13/21  0210 07/12/21  1624   WBC 10*3/mm3 6.25 8.23   HEMOGLOBIN g/dL 13.4 13.7   HEMATOCRIT % 41.3 41.4   PLATELETS 10*3/mm3 214 239     Lab Results   Component Value Date    TROPONINT <0.010 07/12/2021    TROPONINT <0.010 07/12/2021    TROPONINT <0.010 07/12/2021     Lipid Panel    Lipid Panel 7/13/21   Total Cholesterol 153   Triglycerides 200 (A)   HDL Cholesterol 39 (A)   VLDL Cholesterol 34   LDL Cholesterol  80   LDL/HDL Ratio 1.90   (A) Abnormal value             Lab Results   Component Value Date    INR 0.97 07/12/2021    PROTIME 12.6 07/12/2021         Assessment   Assessment:    1. Dyspnea on exertion  - Troponin negative x 3  - LHC: Normal coronaries. LVEDP 22 mmHg.   - Echo, [facility]: Normal EF, mild MR   - D-dimer negative  -Pro-BNP WNL  - CTA chest-no PE, small bilateral bibasilar atelectasis/consolidation with small bilateral pleural effusions    2. HTN, poorly controlled  3. HLD  4. Diabetes  5. Obesity  6. GT    Plan:  - Continue Losartan/HCTZ for HTN  - Increase propranolol to 20 mg BID   - Furosemide 40 mg IV x 1. Home on Lasix PRN  - Low salt diet  - Discussed need for exercise and weight loss.       We will sign off. Patient is okay for d/c home today from cardiac standpoint. He does not need cardiology follow-up. Will need one week f/u with PCP.     Scribed for Lois Levy MD by JACKSON Lindsey, APRN. 7/14/2021  08:25 EDT    I Lois Levy MD personally performed the services described in this documentation as scribed by the above individual in my presence, and it is both accurate and complete.    Lois Levy MD, FACC

## 2021-07-14 NOTE — DISCHARGE SUMMARY
[facility] Medicine Services  DISCHARGE SUMMARY    Patient Name: Joselito Chowdary III  : 1960  MRN: [id number]    Date of Admission: 2021  7:37 PM  Date of Discharge:  2021  Primary Care Physician: Meme Mccollum MD    Consults     Date and Time Order Name Status Description    2021 10:31 PM Inpatient Cardiology Consult Completed           Hospital Course     Presenting Problem:   Crescendo angina (CMS/HCC) [I20.0]    Active Hospital Problems    Diagnosis  POA   • **Exertional dyspnea [R06.00]  Yes   • Dry cough [R05]  Yes   • Edema of both lower extremities [R60.0]  Yes   • Chest pain [R07.9]  Yes   • Bipolar 1 disorder (CMS/HCC) [F31.9]  Yes   • Anxiety [F41.9]  Yes   • Unstable angina (CMS/HCC) [I20.0]  Unknown   • Type 2 diabetes mellitus (CMS/HCC) [E11.9]  Yes   • Severe obstructive sleep apnea [G47.33]  Yes   • Essential hypertension [I10]  Yes   • Dyslipidemia [E78.5]  Yes      Resolved Hospital Problems   No resolved problems to display.          Hospital Course:  Joselito Chowdary III is a 61 y.o. male w/ a hx of HTN, HLD, T2DM, GT who presented to the ED w/ c/o exertional dyspnea and chest pain.    Exertional dyspnea with associated chest pressure  BLE edema   - patient underwent LHC on 21 which demonstrated normal coronary arteries  - CTA negative for PE, showed small bilateral bibasilar atelectasis with small bilateral pleural effusions  - ECHO showing normal LV function with mild MR  - suspect majority of patient's symptoms are related to deconditioning/obesity. Recommend aggressive lifestyle modifications, weight loss and physical activity. Low sodium diet. Blood pressure control   - lasix PRN at home for >2lb weight gain, LE edema or increased SOA     HTN  -continue losartan/HCTZ, increase propranolol to 20mg BID  -encourage compliance  -low salt diet    HLD  -high dose statin     T2DM  -hem a1c 6.8%, followed by endocrinology     Discharge  Follow Up Recommendations for outpatient labs/diagnostics:  - f/u with PCP in one week for blood pressure check    Day of Discharge     HPI:   Patient sitting up in bedside chair, feels well and is without complaint.    Review of Systems  Gen- No fevers, chills  CV- No chest pain, palpitations  Resp- No cough, dyspnea  GI- No N/V/D, abd pain      Vital Signs:   Temp:  [96.7 °F (35.9 °C)-98.5 °F (36.9 °C)] 96.9 °F (36.1 °C)  Heart Rate:  [65-80] 65  Resp:  [18-22] 19  BP: (138-186)/(75-96) 138/75     Physical Exam:  Constitutional: No acute distress, awake, alert, obese male  HENT: NCAT, mucous membranes moist  Respiratory: Clear to auscultation bilaterally, respiratory effort normal   Cardiovascular: RRR, no murmurs, rubs, or gallops  Gastrointestinal: Positive bowel sounds, soft, nontender, nondistended  Musculoskeletal: No bilateral ankle edema  Psychiatric: Appropriate affect, cooperative  Neurologic: Oriented x 3, strength symmetric in all extremities, Cranial Nerves grossly intact to confrontation, speech clear  Skin: No rashes    Pertinent  and/or Most Recent Results     LAB RESULTS:      Lab 07/13/21  1817 07/13/21  0929 07/13/21  0210 07/12/21  1958 07/12/21  1625 07/12/21  1624   WBC  --   --  6.25  --   --  8.23   HEMOGLOBIN  --   --  13.4  --   --  13.7   HEMATOCRIT  --   --  41.3  --   --  41.4   PLATELETS  --   --  214  --   --  239   NEUTROS ABS  --   --  3.38  --   --  5.17   IMMATURE GRANS (ABS)  --   --  0.03  --   --  0.03   LYMPHS ABS  --   --  2.09  --   --  2.09   MONOS ABS  --   --  0.43  --   --  0.56   EOS ABS  --   --  0.27  --   --  0.33   MCV  --   --  91.6  --   --  90.8   PROCALCITONIN  --   --   --  0.10  --   --    LACTATE  --   --   --   --  1.4  --    PROTIME  --   --   --  12.6  --   --    APTT  --   --   --  27.5*  --   --    HEPARIN ANTI-XA 0.45 0.24* 0.10* 0.10*  --   --    D DIMER QUANT  --   --   --  0.31  --   --          Lab 07/14/21  0624 07/13/21  0210 07/12/21  2348  07/12/21 1958 07/12/21  1624   SODIUM 138 134*  --  137 142   POTASSIUM 4.2 3.7  --  3.8 3.9   CHLORIDE 103 101  --  101 107   CO2 26.0 26.0  --  25.0 25.0   ANION GAP 9.0 7.0  --  11.0 10.0   BUN 10 12  --  12 13   CREATININE 0.89 1.00  --  0.95 0.98   GLUCOSE 219* 229*  --  120* 147*   CALCIUM 8.5* 8.5*  --  8.8 8.5*   MAGNESIUM 2.1 1.9 1.9 2.0  --    HEMOGLOBIN A1C  --   --   --   --  6.80*   TSH  --  1.360  --   --   --          Lab 07/12/21  1624   TOTAL PROTEIN 6.0   ALBUMIN 3.70   GLOBULIN 2.3   ALT (SGPT) 29   AST (SGOT) 22   BILIRUBIN 0.4   ALK PHOS 73   LIPASE 39         Lab 07/12/21 2341 07/12/21 1958 07/12/21  1624   PROBNP  --   --  508.6   TROPONIN T <0.010 <0.010 <0.010   PROTIME  --  12.6  --    INR  --  0.97  --          Lab 07/13/21  0210   CHOLESTEROL 153   LDL CHOL 80   HDL CHOL 39*   TRIGLYCERIDES 200*             Brief Urine Lab Results  (Last result in the past 365 days)      Color   Clarity   Blood   Leuk Est   Nitrite   Protein   CREAT   Urine HCG        07/13/21 0246 Yellow Clear Negative Negative Negative Negative             Microbiology Results (last 10 days)     Procedure Component Value - Date/Time    COVID PRE-OP / PRE-PROCEDURE SCREENING ORDER (NO ISOLATION) - Swab, Nasopharynx [[id number]]  (Normal) Collected: 07/12/21 2035    Lab Status: Final result Specimen: Swab from Nasopharynx Updated: 07/12/21 2110    Narrative:      The following orders were created for panel order COVID PRE-OP / PRE-PROCEDURE SCREENING ORDER (NO ISOLATION) - Swab, Nasopharynx.  Procedure                               Abnormality         Status                     ---------                               -----------         ------                     COVID-19 and FLU A/B PCR...[[id number]]  Normal              Final result                 Please view results for these tests on the individual orders.    COVID-19 and FLU A/B PCR - Swab, Nasopharynx [[id number]]  (Normal) Collected: 07/12/21 2035    Lab Status:  Final result Specimen: Swab from Nasopharynx Updated: 07/12/21 2110     COVID19 Not Detected     Influenza A PCR Not Detected     Influenza B PCR Not Detected    Narrative:      Fact sheet for providers: https://www.fda.gov/media/135077/download    Fact sheet for patients: https://www.fda.gov/media/539837/download    Test performed by PCR.          Adult Transthoracic Echo Complete W/ Cont if Necessary Per Protocol    Result Date: 7/13/2021  · Normal LV systolic function · Mild MR      CT Angiogram Chest With & Without Contrast    Result Date: 7/13/2021  CT ANGIOGRAM CHEST W WO CONTRAST INDICATION: Shortness of air, suspect PE TECHNIQUE: CT angiogram of the chest with IV contrast. 3-D reconstructions were obtained and reviewed.   Radiation dose reduction techniques included automated exposure control or exposure modulation based on body size. Count of known CT and cardiac nuc med studies performed in previous 12 months: 0. COMPARISON: None available. FINDINGS: No definite pulmonary embolism. Thoracic aorta appears unremarkable. Cardiac structures appear within normal limits. Upper abdominal structures appear within normal limits. There are small bibasilar atelectasis/consolidation and small pleural effusions. There is a small hiatal hernia. No acute osseous abnormality.     1. Negative for pulmonary embolus. 2. There is bibasilar atelectasis/consolidation with small bilateral pleural effusions Signer Name: Fani Sal MD  Signed: 7/13/2021 9:29 PM  Workstation Name: [id number]  [facility]    Cardiac Catheterization/Vascular Study    Result Date: 7/13/2021  FINAL     · Normal coronary arteries · Normal LV systolic function wall motion · Elevated LVEDP at 22 mmHg RECOMMENDATIONS: · Other sources for the patient's shortness of breath and chest discomfort on exertion should be considered. · Diuresis may be beneficial for the elevated LVEDP however control of the patient's systemic hypertension  may result in an normal LVEDP. Indications: Chest pain and dyspnea on exertion consistent with unstable angina Access: Left radial Procedures: · Left heart catheterization. · Left ventriculogram. · Selective coronary angiography. Procedure narrative: The patient was brought to the catheterization lab in a fasting condition.  Access site was prepped and draped in standard sterile fashion.  Lidocaine was injected and arterial access was obtained by percutaneous anterior wall puncture technique.  A 6 [place] arterial sheath was placed in the left radial artery using a modified Seldinger technique.  Selective coronary arteriography was performed using the Troy technique with a 6 K[place] AL-1 catheter to engage the RCA and a 6 [place] 3.5 curved Troy left catheter.  Nonionic contrast was used and was injected manually.  Left ventriculography was performed using 30 ML of contrast power injected at 10 ML per second.  Left heart pressure pull back revealed no gradient.  Contrast: 140 ml Hemodynamic Findings: LV pressure: 160/14/22 mmHg, on pull back no gradient was recorded across the aortic valve. Ao pressure: 155/75 mmHg Left ventriculography: Single-plane KRUEGER left to calligraphy disclosed normal left ventricular systolic function wall motion with an LVEF of 55 to 60%.  No significant mitral regurgitation is seen. Angiographic Findings: LMCA: Left main coronary artery gives rise to the LAD and circumflex vessels and is free of disease. Circumflex: Circumflex coronary is dominant for the posterior circulation gives rise to a large first obtuse marginal branch moderate to large second obtuse marginal branch large third obtuse marginal branch which serves as the PDA and a posterolateral branch.  The circumflex and its branches are free of disease. LAD: The LAD gives rise to a small first diagonal branch moderate second diagonal branch, 3 additional small diagonal branches and terminates as a small apical recurrent branch.   The LAD and its branches contain no disease. RCA: The right coronary is nondominant for the posterior circulation and gives rise to 3 RV marginal branches.  The right coronary and its branches contain no disease.     XR Chest 1 View    Result Date: 7/13/2021  EXAMINATION: XR CHEST 1 VW- 07/12/2021  INDICATION: Chest Pain triage protocol  COMPARISON: 10/28/2011  FINDINGS: Portable chest reveals heart to be borderline enlarged. Underlying chronic changes seen within the lung fields bilaterally. No focal parenchymal opacification present. No pleural effusion or pneumothorax. Bony structures are unremarkable. Pulmonary vascularity is within normal limits.        No acute cardiopulmonary disease.  D:  07/12/2021 E:  07/13/2021                 Results for orders placed during the hospital encounter of 07/12/21    Adult Transthoracic Echo Complete W/ Cont if Necessary Per Protocol    Interpretation Summary  · Normal LV systolic function  · Mild MR      Plan for Follow-up of Pending Labs/Results:     Discharge Details        Discharge Medications      New Medications      Instructions Start Date   furosemide 40 MG tablet  Commonly known as: Lasix   40 mg, Oral, Daily PRN         Changes to Medications      Instructions Start Date   propranolol 20 MG tablet  Commonly known as: INDERAL  What changed:   · medication strength  · how much to take  · how to take this   20 mg, Oral, Every 12 Hours Scheduled         Continue These Medications      Instructions Start Date   albuterol 108 (90 Base) MCG/ACT inhaler  Commonly known as: PROAIR RESPICLICK   ProAir HFA 90 mcg/actuation aerosol inhaler   Every six hours      aspirin 81 MG EC tablet   81 mg, Oral, Daily      atorvastatin 40 MG tablet  Commonly known as: LIPITOR   atorvastatin 40 mg tablet   TAKE 1 TABLET BY MOUTH ONE TIME A DAY      buPROPion  MG 24 hr tablet  Commonly known as: WELLBUTRIN XL   No dose, route, or frequency recorded.      lamoTRIgine 200 MG  tablet  Commonly known as: LaMICtal   No dose, route, or frequency recorded.      loratadine 10 MG tablet  Commonly known as: CLARITIN   loratadine 10 mg tablet   Take 1 tablet every day by oral route as needed for 90 days.      losartan-hydrochlorothiazide 100-25 MG per tablet  Commonly known as: HYZAAR   1 tablet, Oral, Daily, 200001      metFORMIN 500 MG tablet  Commonly known as: GLUCOPHAGE   No dose, route, or frequency recorded.      NON FORMULARY   Gave a different kind of cholesterol medication       NovoLOG FlexPen 100 UNIT/ML solution pen-injector sc pen  Generic drug: insulin aspart   No dose, route, or frequency recorded.      ondansetron ODT 8 MG disintegrating tablet  Commonly known as: ZOFRAN-ODT   Every 8 Hours Scheduled      potassium chloride 10 MEQ CR tablet   No dose, route, or frequency recorded.      PROzac 20 MG capsule  Generic drug: FLUoxetine   Prozac 20 mg capsule   Take 1 capsule every day by oral route.      SITagliptin 25 MG tablet  Commonly known as: JANUVIA   25 mg, Oral, Daily      Tresiba FlexTouch 100 UNIT/ML solution pen-injector injection  Generic drug: insulin degludec   90 Units.      Victoza 18 MG/3ML solution pen-injector injection  Generic drug: Liraglutide   No dose, route, or frequency recorded.         Stop These Medications    hydroCHLOROthiazide 25 MG tablet  Commonly known as: HYDRODIURIL            Allergies   Allergen Reactions   • Zestoretic [Lisinopril-Hydrochlorothiazide] Cough         Discharge Disposition:  Home or Self Care    Diet:  Hospital:  Diet Order   Procedures   • Diet Regular; Consistent Carbohydrate, Cardiac       Activity:  - as tolerated    Restrictions or Other Recommendations:  - none    CODE STATUS:    Code Status and Medical Interventions:   Ordered at: 07/13/21 5536     Level Of Support Discussed With:    Patient     Code Status:    CPR     Medical Interventions (Level of Support Prior to Arrest):    Full       No future  appointments.              Caprice Judd,   07/14/21      Time Spent on Discharge:  I spent  31 minutes on this discharge activity which included: face-to-face encounter with the patient, reviewing the data in the system, coordination of the care with the nursing staff as well as consultants, documentation, and entering orders.

## 2021-07-14 NOTE — PLAN OF CARE
Goal Outcome Evaluation:  Plan of Care Reviewed With: patient           Outcome Summary: Pt on room air. VSS. Pt had CT angio this shift. Left radial site is soft with no signs of hematoma. Blood sugar over 300 with 7 units insulin given. Pt rested well overnight. Will continue to monitor.

## 2021-07-14 NOTE — CASE MANAGEMENT/SOCIAL WORK
Continued Stay Note  [facility]     Patient Name: Joselito Chowdary III  MRN: [id number]  Today's Date: 7/14/2021    Admit Date: 7/12/2021    Discharge Plan     Row Name 07/14/21 1248       Plan    Final Discharge Disposition Code  01 - home or self-care    Row Name 07/14/21 1247       Plan    Final Discharge Disposition Code  01 - home or self-care        Discharge Codes    No documentation.       Expected Discharge Date and Time     Expected Discharge Date Expected Discharge Time    Jul 14, 2021             Marissa Jones RN

## 2021-09-08 ENCOUNTER — OFFICE VISIT (OUTPATIENT)
Dept: ORTHOPEDIC SURGERY | Facility: CLINIC | Age: 61
End: 2021-09-08

## 2021-09-08 VITALS — HEART RATE: 88 BPM | HEIGHT: 70 IN | OXYGEN SATURATION: 97 % | WEIGHT: 315 LBS | BODY MASS INDEX: 45.1 KG/M2

## 2021-09-08 DIAGNOSIS — M17.0 PRIMARY OSTEOARTHRITIS OF BOTH KNEES: Primary | ICD-10-CM

## 2021-09-08 DIAGNOSIS — E66.01 OBESITY, MORBID, BMI 40.0-49.9 (HCC): ICD-10-CM

## 2021-09-08 PROCEDURE — 20610 DRAIN/INJ JOINT/BURSA W/O US: CPT | Performed by: ORTHOPAEDIC SURGERY

## 2021-09-08 PROCEDURE — 99214 OFFICE O/P EST MOD 30 MIN: CPT | Performed by: ORTHOPAEDIC SURGERY

## 2021-09-08 RX ORDER — ROPIVACAINE HYDROCHLORIDE 5 MG/ML
4 INJECTION, SOLUTION EPIDURAL; INFILTRATION; PERINEURAL
Status: COMPLETED | OUTPATIENT
Start: 2021-09-08 | End: 2021-09-08

## 2021-09-08 RX ORDER — TRIAMCINOLONE ACETONIDE 40 MG/ML
40 INJECTION, SUSPENSION INTRA-ARTICULAR; INTRAMUSCULAR
Status: COMPLETED | OUTPATIENT
Start: 2021-09-08 | End: 2021-09-08

## 2021-09-08 RX ADMIN — TRIAMCINOLONE ACETONIDE 40 MG: 40 INJECTION, SUSPENSION INTRA-ARTICULAR; INTRAMUSCULAR at 10:56

## 2021-09-08 RX ADMIN — ROPIVACAINE HYDROCHLORIDE 4 ML: 5 INJECTION, SOLUTION EPIDURAL; INFILTRATION; PERINEURAL at 10:56

## 2021-09-08 NOTE — PROGRESS NOTES
Procedure   Large Joint Arthrocentesis: R knee  Date/Time: 9/8/2021 10:56 AM  Consent given by: patient  Site marked: site marked  Timeout: Immediately prior to procedure a time out was called to verify the correct patient, procedure, equipment, support staff and site/side marked as required   Supporting Documentation  Indications: pain   Procedure Details  Location: knee - R knee  Preparation: Patient was prepped and draped in the usual sterile fashion  Needle size: 23 G  Approach: anterolateral  Medications administered: 40 mg triamcinolone acetonide 40 MG/ML; 4 mL ropivacaine 0.5 %  Patient tolerance: patient tolerated the procedure well with no immediate complications

## 2021-09-08 NOTE — PROGRESS NOTES
[facility] [place] [facility] Note    Subjective     Chief Complaint   Patient presents with   • Right Knee - Pain   • Left Knee - Pain        HPI    Joselito Chowdary III is a 61 y.o. male who presents with new problem of bilateral knee pain, right worse than left. The patient indicates generalized pain throughout his knees. He rates the pain as 8 out of 10 at its worst. He has some swelling, stiffness, and aching pain. It hurts when he climbs stairs and walks. He has taken ibuprofen but has never had an injection in his knees before.    The patient is a diabetic and is on insulin. He reports a most recent hemoglobin A1c of 7.1, which was obtained a little less than 3 months ago. He was previously seen by Dr. Levy, who recommended that he lose a pound per week. The patient states weight loss has not been going well for him.    I previously treated the patient's mother, Marilee Chowdary. Unfortunately, she passed away in 01/2021, after hip fracture at age 90. 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    I have reviewed the following portions of the patient's history and agree with: History of Present Illness and Review of Systems    Patient Active Problem List   Diagnosis   • Severe obstructive sleep apnea   • Morbid obesity (CMS/HCC)   • Hypersomnia   • Type 2 diabetes mellitus (CMS/HCC)   • Essential hypertension   • Dyslipidemia   • Snoring   • Exertional dyspnea   • Dry cough   • Edema of both lower extremities   • Chest pain   • Bipolar 1 disorder (CMS/HCC)   • Anxiety   • Unstable angina (CMS/HCC)     Past Medical History:   Diagnosis Date   • Bipolar 1 disorder (CMS/HCC)    • Diabetes mellitus (CMS/HCC)    • Diverticulitis    • Hyperlipidemia    • Hypertension    • Mood disorder (CMS/HCC)    • Respiratory infection       Past Surgical History:   Procedure Laterality Date   • CARDIAC CATHETERIZATION  2012   • CARDIAC CATHETERIZATION N/A 7/13/2021    Procedure: Left Heart Cath;  Surgeon: Cam  Lois ROD MD;  Location:  [facility] INVASIVE LOCATION;  Service: Cardiology;  Laterality: N/A;   • CHOLECYSTECTOMY     • COLONOSCOPY     • GALLBLADDER SURGERY     • SHOULDER SURGERY Right     REPAIR       Family History   Problem Relation Age of Onset   • Cancer Mother    • Hypertension Mother    • Deep vein thrombosis Mother    • Diabetes Father    • Heart disease Father    • Hypertension Father    • COPD Father    • Heart failure Father      Social History     Socioeconomic History   • Marital status:      Spouse name: Not on file   • Number of children: Not on file   • Years of education: Not on file   • Highest education level: Not on file   Tobacco Use   • Smoking status: Former Smoker     Packs/day: 1.00     Years: 8.00     Pack years: 8.00     Types: Cigarettes     Start date:      Quit date:      Years since quittin.7   • Smokeless tobacco: Never Used   Vaping Use   • Vaping Use: Never used   Substance and Sexual Activity   • Alcohol use: Never   • Drug use: Never   • Sexual activity: Defer      Current Outpatient Medications on File Prior to Visit   Medication Sig Dispense Refill   • albuterol (PROAIR RESPICLICK) 108 (90 Base) MCG/ACT inhaler ProAir HFA 90 mcg/actuation aerosol inhaler   Every six hours     • aspirin 81 MG EC tablet Take 81 mg by mouth Daily.     • atorvastatin (LIPITOR) 40 MG tablet atorvastatin 40 mg tablet   TAKE 1 TABLET BY MOUTH ONE TIME A DAY     • buPROPion XL (WELLBUTRIN XL) 300 MG 24 hr tablet      • FLUoxetine (PROzac) 20 MG capsule Prozac 20 mg capsule   Take 1 capsule every day by oral route.     • furosemide (Lasix) 40 MG tablet Take 1 tablet by mouth Daily As Needed (>2lb weight gain, Lower extremity swelling or SOB). 30 tablet 0   • lamoTRIgine (LaMICtal) 200 MG tablet      • loratadine (CLARITIN) 10 MG tablet loratadine 10 mg tablet   Take 1 tablet every day by oral route as needed for 90 days.     • losartan-hydrochlorothiazide (HYZAAR) 100-25 MG per  tablet Take 1 tablet by mouth Daily. 200001  5   • metFORMIN (GLUCOPHAGE) 500 MG tablet      • NON FORMULARY Gave a different kind of cholesterol medication     • NOVOLOG FLEXPEN 100 UNIT/ML solution pen-injector sc pen      • ondansetron ODT (ZOFRAN-ODT) 8 MG disintegrating tablet Every 8 (Eight) Hours.     • potassium chloride (K-DUR) 10 MEQ CR tablet      • propranolol (INDERAL) 20 MG tablet Take 1 tablet by mouth Every 12 (Twelve) Hours. 60 tablet 0   • SITagliptin (JANUVIA) 25 MG tablet Take 25 mg by mouth Daily.     • TRESIBA FLEXTOUCH 100 UNIT/ML solution pen-injector injection 90 Units.  8   • VICTOZA 18 MG/3ML solution pen-injector        No current facility-administered medications on file prior to visit.      Allergies   Allergen Reactions   • Zestoretic [Lisinopril-Hydrochlorothiazide] Cough        Review of Systems   Constitutional: Negative for activity change, appetite change, chills, diaphoresis, fatigue, fever and unexpected weight change.   HENT: Negative for congestion, dental problem, drooling, ear discharge, ear pain, facial swelling, hearing loss, mouth sores, nosebleeds, postnasal drip, rhinorrhea, sinus pressure, sneezing, sore throat, tinnitus, trouble swallowing and voice change.    Eyes: Negative for photophobia, pain, discharge, redness, itching and visual disturbance.   Respiratory: Negative for apnea, cough, choking, chest tightness, shortness of breath, wheezing and stridor.    Cardiovascular: Negative for chest pain, palpitations and leg swelling.   Gastrointestinal: Negative for abdominal distention, abdominal pain, anal bleeding, blood in stool, constipation, diarrhea, nausea, rectal pain and vomiting.   Endocrine: Negative for cold intolerance, heat intolerance, polydipsia, polyphagia and polyuria.   Genitourinary: Negative for decreased urine volume, difficulty urinating, dysuria, enuresis, flank pain, frequency, genital sores, hematuria and urgency.   Musculoskeletal: Positive  "for arthralgias. Negative for back pain, gait problem, joint swelling, myalgias, neck pain and neck stiffness.   Skin: Negative for color change, pallor, rash and wound.   Allergic/Immunologic: Negative for environmental allergies, food allergies and immunocompromised state.   Neurological: Negative for dizziness, tremors, seizures, syncope, facial asymmetry, speech difficulty, weakness, light-headedness, numbness and headaches.   Hematological: Negative for adenopathy. Does not bruise/bleed easily.   Psychiatric/Behavioral: Negative for agitation, behavioral problems, confusion, decreased concentration, dysphoric mood, hallucinations, self-injury, sleep disturbance and suicidal ideas. The patient is not nervous/anxious and is not hyperactive.         Objective      Physical Exam  Pulse 88   Ht 177.8 cm (70\")   Wt (!) 158 kg (348 lb)   SpO2 97%   BMI 49.93 kg/m²     Body mass index is 49.93 kg/m².    General:   Mental Status:  Alert   Appearance: Cooperative, in no acute distress   Build and Nutrition: Obese by BMI male   Orientation: Alert and oriented to person, place and time   Posture: Normal   Gait: Mildly antalgic on both lower extremities    Integument  • Right knee: No skin lesions, rash, or ecchymosis.  • Left knee: No skin lesions, rash, or ecchymosis.    Neurologic  • Sensation:  • Right foot: Intact to light touch on the dorsal and plantar aspects  • Left foot: Intact to light touch on the dorsal and plantar aspects    Motor  • Right lower extremity: 5/5 quadriceps, hamstrings, ankle dorsiflexors, and ankle plantar flexors  • Left lower extremity: 5/5 quadriceps, hamstrings, ankle dorsiflexors, and ankle plantar dorsiflexors.    Vascular  • Right lower extremity: 2+ dorsalis pedis pulse. Prompt capillary refill.  • Left lower extremity: 2+ dorsalis pedis pulse. Prompt capillary refill.    Lower Extremities  • Right Knee:  • Tenderness: Mild medial and lateral joint line tenderness.  • Swelling: " None  • Effusion: 1+  • Crepitus: Positive  • Atrophy: None  • Range of motion:  • Extension: 5°  • Flexion: 115°  • Instability: No varus or valgus laxity. Negative anterior drawer.  • Deformities: Varus alignment    • Left Knee:  • Tenderness: No medial or lateral joint line tenderness.  • Swelling: None  • Effusion: None  • Crepitus: Positive  • Atrophy: None  • Range of Motion:  • Extension: 0°  • Flexion: 120°  • Instability: No varus or valgus laxity. Negative anterior drawer.  • Deformities: Varus alignment    Imaging/Studies      Imaging Results (Last 24 Hours)     Procedure Component Value Units Date/Time    XR Knee 4+ View Bilateral [[id number]] Resulted: 09/08/21 1041     Updated: 09/08/21 1042    Narrative:      Right Knee Radiographs  Indication: right knee pain  Views: Standing AP's and skiers of both knees, with lateral and sunrise   views of the right knee    Comparison: no prior studies available    Findings:   Bone-on-bone contact medial compartment, tricompartmental osteophytes,   varus alignment, no acute bony abnormalities.  No unusual bony features.    Left Knee Radiographs  Indication: left knee pain  Views: Standing AP's and skiers of both knees, with lateral and sunrise   views of the left knee    Comparison: no prior studies available    Findings:   Bone-on-bone contact medial compartment, tricompartmental osteophytes,   varus alignment, no acute bony abnormalities.  No unusual bony features.          Assessment and Plan     Diagnoses and all orders for this visit:    1. Primary osteoarthritis of both knees (Primary)  -     XR Knee 4+ View Bilateral  -     Large Joint Arthrocentesis: R knee  -     triamcinolone acetonide (KENALOG-40) injection 40 mg  -     ropivacaine (NAROPIN) 0.5 % injection 4 mL    2. Obesity, morbid, BMI 40.0-49.9 (CMS/HCC)        1. Primary osteoarthritis of both knees    2. Obesity, morbid, BMI 40.0-49.9 (CMS/HCC)        We discussed his bilateral knee x-rays, which  demonstrated bone-on-bone contact on the medial aspect of his bilateral knees. As his right knee is more bothersome, I offered him a cortisone injection for his right knee today, which he was agreeable to. We also discussed the possibility of a total knee arthroplasty in the future, but we would need his body mass index to be below 40 to proceed with this. We also discussed the possibility of viscosupplementation injections in the future. I encouraged him to continue working on weight loss, as this would be beneficial for his knee pain.    Procedure Note:  The potential benefits of performing a therapeutic right knee joint injection, as well as potential risks (including, but not limited to infection, swelling, pain, bleeding, bruising, nerve/blood vessel damage, skin color changes, transient elevation in blood glucose levels, and fat atrophy) were discussed with the patient. After informed consent was obtained, a timeout procedure was performed, and the skin on the right knee was prepped with chlorhexidine soap and alcohol, after which ethyl chloride was applied to the skin at the injection site. Via the anterolateral approach, 1 ml of Kenalog 40 mg/ml mixed with 4 ml 0.5% ropivacaine plain was injected into the knee joint. The patient tolerated the procedure well, experiencing 75% improvement a few minutes following the injection. There were no complications. A Band-Aid was applied to the injection site. Post-procedural instructions were given to the patient and/or their caregiver.    The patient will follow up in 3 months.    Return in about 3 months (around 12/8/2021).      Transcribed from ambient dictation for Nazario Mares MD by Rhonda Mcguire.  09/08/21   14:06 EDT    I have personally performed the services described in this document as transcribed by the above individual, and it is both accurate and complete.  Nazario Mares MD  9/8/2021  22:39 EDT

## 2021-12-08 ENCOUNTER — OFFICE VISIT (OUTPATIENT)
Dept: ORTHOPEDIC SURGERY | Facility: CLINIC | Age: 61
End: 2021-12-08

## 2021-12-08 VITALS
HEIGHT: 70 IN | DIASTOLIC BLOOD PRESSURE: 82 MMHG | BODY MASS INDEX: 45.1 KG/M2 | WEIGHT: 315 LBS | SYSTOLIC BLOOD PRESSURE: 130 MMHG

## 2021-12-08 DIAGNOSIS — M17.0 PRIMARY OSTEOARTHRITIS OF BOTH KNEES: Primary | ICD-10-CM

## 2021-12-08 DIAGNOSIS — E66.01 OBESITY, MORBID, BMI 40.0-49.9 (HCC): ICD-10-CM

## 2021-12-08 PROCEDURE — 99212 OFFICE O/P EST SF 10 MIN: CPT | Performed by: ORTHOPAEDIC SURGERY

## 2021-12-08 RX ORDER — BUMETANIDE 1 MG/1
TABLET ORAL
COMMUNITY

## 2021-12-08 RX ORDER — METFORMIN HYDROCHLORIDE 500 MG/1
TABLET, EXTENDED RELEASE ORAL
COMMUNITY
Start: 2021-11-07

## 2021-12-08 RX ORDER — LOSARTAN POTASSIUM 100 MG/1
TABLET ORAL
COMMUNITY
Start: 2021-11-15

## 2021-12-08 RX ORDER — HYDROCHLOROTHIAZIDE 25 MG/1
25 TABLET ORAL DAILY
COMMUNITY
Start: 2021-11-15 | End: 2022-11-25

## 2021-12-08 RX ORDER — EMPAGLIFLOZIN 25 MG/1
25 TABLET, FILM COATED ORAL EVERY MORNING
COMMUNITY
Start: 2021-11-07

## 2021-12-08 RX ORDER — INSULIN GLARGINE 100 [IU]/ML
INJECTION, SOLUTION SUBCUTANEOUS
COMMUNITY
Start: 2021-12-02

## 2021-12-08 NOTE — PROGRESS NOTES
[facility] [place] [facility]    Subjective     Chief Complaint   Patient presents with   • Follow-up     3 month follow up; Primary osteoarthritis of both knees -last cortisone injection to the right knee given on 9/8/21        HPI    It has been 3  month(s) since Mr. Chowdary's last visit. He returns to clinic today for follow-up of bilateral knee arthritis. The issue has been ongoing for 6 month(s). He rates his pain a 2/10 on the pain scale. Previous/current treatments: NSAIDS and steroid injection (last injection 09/8/21). Current symptoms: pain and stiffness. The pain is worse with walking and climbing stairs; lying down improve the pain. Overall, he is doing better.  The injection in his right knee on his last visit provided significant relief.  He is not having significant pain today, and is doing well.    I have reviewed the following portions of the patient's history and agree with: History of Present Illness and Review of Systems    Patient Active Problem List   Diagnosis   • Severe obstructive sleep apnea   • Morbid obesity (HCC)   • Hypersomnia   • Type 2 diabetes mellitus (HCC)   • Essential hypertension   • Dyslipidemia   • Snoring   • Exertional dyspnea   • Dry cough   • Edema of both lower extremities   • Chest pain   • Bipolar 1 disorder (HCC)   • Anxiety   • Unstable angina (HCC)     Past Medical History:   Diagnosis Date   • Bipolar 1 disorder (HCC)    • Diabetes mellitus (HCC)    • Diverticulitis    • Hyperlipidemia    • Hypertension    • Mood disorder (HCC)    • Respiratory infection       Past Surgical History:   Procedure Laterality Date   • CARDIAC CATHETERIZATION  2012   • CARDIAC CATHETERIZATION N/A 7/13/2021    Procedure: Left Heart Cath;  Surgeon: Lois Levy MD;  Location: [facility] INVASIVE LOCATION;  Service: Cardiology;  Laterality: N/A;   • CHOLECYSTECTOMY     • COLONOSCOPY     • GALLBLADDER SURGERY     • SHOULDER SURGERY Right     REPAIR       Family History    Problem Relation Age of Onset   • Cancer Mother    • Hypertension Mother    • Deep vein thrombosis Mother    • Diabetes Father    • Heart disease Father    • Hypertension Father    • COPD Father    • Heart failure Father      Social History     Socioeconomic History   • Marital status:    Tobacco Use   • Smoking status: Former Smoker     Packs/day: 1.00     Years: 8.00     Pack years: 8.00     Types: Cigarettes     Start date:      Quit date:      Years since quittin.9   • Smokeless tobacco: Never Used   Vaping Use   • Vaping Use: Never used   Substance and Sexual Activity   • Alcohol use: Never   • Drug use: Never   • Sexual activity: Defer      Current Outpatient Medications on File Prior to Visit   Medication Sig Dispense Refill   • albuterol (PROAIR RESPICLICK) 108 (90 Base) MCG/ACT inhaler ProAir HFA 90 mcg/actuation aerosol inhaler   Every six hours     • aspirin 81 MG EC tablet Take 81 mg by mouth Daily.     • bumetanide (BUMEX) 1 MG tablet bumetanide 1 mg tablet   Take 1 tablet twice a day for 3 days, then take 1 tablet daily     • buPROPion XL (WELLBUTRIN XL) 300 MG 24 hr tablet      • FLUoxetine (PROzac) 20 MG capsule Prozac 20 mg capsule   Take 1 capsule every day by oral route.     • hydroCHLOROthiazide (HYDRODIURIL) 25 MG tablet Take 25 mg by mouth Daily.     • Insulin Glargine (BASAGLAR KWIKPEN) 100 UNIT/ML injection pen      • Jardiance 25 MG tablet tablet Take 25 mg by mouth Every Morning.     • lamoTRIgine (LaMICtal) 200 MG tablet      • loratadine (CLARITIN) 10 MG tablet loratadine 10 mg tablet   Take 1 tablet every day by oral route as needed for 90 days.     • losartan (COZAAR) 100 MG tablet TAKE 1 TABLET BY MOUTH EVERY DAY WITH HYDROCHLOROTHIAZIDE 25 MG TABLET     • metFORMIN ER (GLUCOPHAGE-XR) 500 MG 24 hr tablet TAKE 2 TABLETS BY MOUTH IN THE MORNING AND 2 TABLETS AT NIGHT     • NOVOLOG FLEXPEN 100 UNIT/ML solution pen-injector sc pen      • ondansetron ODT (ZOFRAN-ODT) 8  MG disintegrating tablet As Needed.     • propranolol (INDERAL) 20 MG tablet Take 1 tablet by mouth Every 12 (Twelve) Hours. 60 tablet 0   • VICTOZA 18 MG/3ML solution pen-injector      • [DISCONTINUED] atorvastatin (LIPITOR) 40 MG tablet atorvastatin 40 mg tablet   TAKE 1 TABLET BY MOUTH ONE TIME A DAY     • [DISCONTINUED] furosemide (Lasix) 40 MG tablet Take 1 tablet by mouth Daily As Needed (>2lb weight gain, Lower extremity swelling or SOB). 30 tablet 0   • [DISCONTINUED] losartan-hydrochlorothiazide (HYZAAR) 100-25 MG per tablet Take 1 tablet by mouth Daily. 200001  5   • [DISCONTINUED] metFORMIN (GLUCOPHAGE) 500 MG tablet      • [DISCONTINUED] NON FORMULARY Gave a different kind of cholesterol medication     • [DISCONTINUED] potassium chloride (K-DUR) 10 MEQ CR tablet      • [DISCONTINUED] SITagliptin (JANUVIA) 25 MG tablet Take 25 mg by mouth Daily.     • [DISCONTINUED] TRESIBA FLEXTOUCH 100 UNIT/ML solution pen-injector injection 90 Units.  8     No current facility-administered medications on file prior to visit.      Allergies   Allergen Reactions   • Zestoretic [Lisinopril-Hydrochlorothiazide] Cough        Review of Systems   Constitutional: Negative for activity change, appetite change, chills, diaphoresis, fatigue, fever and unexpected weight change.   HENT: Negative for congestion, dental problem, drooling, ear discharge, ear pain, facial swelling, hearing loss, mouth sores, nosebleeds, postnasal drip, rhinorrhea, sinus pressure, sneezing, sore throat, tinnitus, trouble swallowing and voice change.    Eyes: Negative for photophobia, pain, discharge, redness, itching and visual disturbance.   Respiratory: Negative for apnea, cough, choking, chest tightness, shortness of breath, wheezing and stridor.    Cardiovascular: Negative for chest pain, palpitations and leg swelling.   Gastrointestinal: Negative for abdominal distention, abdominal pain, anal bleeding, blood in stool, constipation, diarrhea,  "nausea, rectal pain and vomiting.   Endocrine: Negative for cold intolerance, heat intolerance, polydipsia, polyphagia and polyuria.   Genitourinary: Negative for decreased urine volume, difficulty urinating, dysuria, enuresis, flank pain, frequency, genital sores, hematuria and urgency.   Musculoskeletal: Positive for arthralgias. Negative for back pain, gait problem, joint swelling, myalgias, neck pain and neck stiffness.   Skin: Negative for color change, pallor, rash and wound.   Allergic/Immunologic: Negative for environmental allergies, food allergies and immunocompromised state.   Neurological: Negative for dizziness, tremors, seizures, syncope, facial asymmetry, speech difficulty, weakness, light-headedness, numbness and headaches.   Hematological: Negative for adenopathy. Does not bruise/bleed easily.   Psychiatric/Behavioral: Negative for agitation, behavioral problems, confusion, decreased concentration, dysphoric mood, hallucinations, self-injury, sleep disturbance and suicidal ideas. The patient is not nervous/anxious and is not hyperactive.         Objective      Physical Exam  /82   Ht 177.8 cm (70\")   Wt (!) 157 kg (345 lb 12.8 oz)   BMI 49.62 kg/m²     Body mass index is 49.62 kg/m².    General:   Mental Status:  Alert   Appearance: Cooperative, in no acute distress   Build and Nutrition: Obese by BMI male   Orientation: Alert and oriented to person, place and time   Posture: Normal   Gait: Nonantalgic    Integument:   Right knee: no skin lesions, no rash, no ecchymosis   Left knee: no skin lesions, no rash, no ecchymosis    Lower Extremities:   Right Knee:    Tenderness:  None    Effusion:  1+    Swelling:  None    Crepitus:  Positive    Atrophy:  None    Range of motion:  Extension: 0°       Flexion: 120°  Instability:  No varus laxity, no valgus laxity, negative anterior drawer  Deformities:  Varus   Left Knee:    Tenderness:  None    Effusion:  None    Swelling: "  None    Crepitus: Positive    Atrophy:  None    Range of motion:  Extension: 0°       Flexion: 120°  Instability:  No varus laxity, no valgus laxity, negative anterior drawer  Deformities:  Varus      Imaging/Studies  Imaging Results (Last 24 Hours)     ** No results found for the last 24 hours. **        No new imaging today    Assessment and Plan     Diagnoses and all orders for this visit:    1. Primary osteoarthritis of both knees (Primary)    2. Obesity, morbid, BMI 40.0-49.9 (HCC)        1. Primary osteoarthritis of both knees    2. Obesity, morbid, BMI 40.0-49.9 (HCC)        I reviewed my findings with the patient.  His right knee responded well to the injection, and overall he is doing well.  I encouraged him with weight loss today, and discussed the multiplier effect of weight loss on his knees.  I will see him back in 6 months, but sooner for any problems.  Repeat injection could be considered in the future.    Return in about 6 months (around 6/8/2022).      Nazario Mares MD  12/08/21  09:38 EST

## 2022-06-08 ENCOUNTER — OFFICE VISIT (OUTPATIENT)
Dept: ORTHOPEDIC SURGERY | Facility: CLINIC | Age: 62
End: 2022-06-08

## 2022-06-08 VITALS
HEIGHT: 70 IN | SYSTOLIC BLOOD PRESSURE: 136 MMHG | WEIGHT: 315 LBS | DIASTOLIC BLOOD PRESSURE: 80 MMHG | BODY MASS INDEX: 45.1 KG/M2

## 2022-06-08 DIAGNOSIS — E66.01 OBESITY, MORBID, BMI 40.0-49.9: ICD-10-CM

## 2022-06-08 DIAGNOSIS — M17.0 PRIMARY OSTEOARTHRITIS OF BOTH KNEES: Primary | ICD-10-CM

## 2022-06-08 PROCEDURE — 20610 DRAIN/INJ JOINT/BURSA W/O US: CPT | Performed by: ORTHOPAEDIC SURGERY

## 2022-06-08 RX ORDER — ROPIVACAINE HYDROCHLORIDE 5 MG/ML
4 INJECTION, SOLUTION EPIDURAL; INFILTRATION; PERINEURAL
Status: COMPLETED | OUTPATIENT
Start: 2022-06-08 | End: 2022-06-08

## 2022-06-08 RX ORDER — PRAVASTATIN SODIUM 40 MG
40 TABLET ORAL DAILY
COMMUNITY
Start: 2022-04-22

## 2022-06-08 RX ORDER — TRIAMCINOLONE ACETONIDE 40 MG/ML
40 INJECTION, SUSPENSION INTRA-ARTICULAR; INTRAMUSCULAR
Status: COMPLETED | OUTPATIENT
Start: 2022-06-08 | End: 2022-06-08

## 2022-06-08 RX ADMIN — TRIAMCINOLONE ACETONIDE 40 MG: 40 INJECTION, SUSPENSION INTRA-ARTICULAR; INTRAMUSCULAR at 09:26

## 2022-06-08 RX ADMIN — ROPIVACAINE HYDROCHLORIDE 4 ML: 5 INJECTION, SOLUTION EPIDURAL; INFILTRATION; PERINEURAL at 09:26

## 2022-06-08 NOTE — PROGRESS NOTES
Procedure   - Large Joint Arthrocentesis: bilateral knee on 6/8/2022 9:26 AM  Indications: pain  Details: 22 G needle, anterolateral approach  Medications (Right): 4 mL ropivacaine 0.5 %; 40 mg triamcinolone acetonide 40 MG/ML  Medications (Left): 4 mL ropivacaine 0.5 %; 40 mg triamcinolone acetonide 40 MG/ML  Outcome: tolerated well, no immediate complications  Procedure, treatment alternatives, risks and benefits explained, specific risks discussed. Consent was given by the patient. Immediately prior to procedure a time out was called to verify the correct patient, procedure, equipment, support staff and site/side marked as required. Patient was prepped and draped in the usual sterile fashion.

## 2022-06-08 NOTE — PROGRESS NOTES
[facility] [place]    Subjective     Chief Complaint   Patient presents with   • Follow-up     6 month follow up;  Primary osteoarthritis of both knees-last cortisone injection given to the right knee on 9/8/21        HPI    It has been 6  month(s) since Mr. Chowdary's last visit. He returns to clinic today for follow-up of bilateral knee arthritis. The issue has been ongoing for 1 year(s). He rates his pain a 6/10 on the pain scale. Previous/current treatments: steroid injection (last injection 09/8/21). Current symptoms: same as prior visit. The pain is worse with walking and climbing stairs; resting and elevating the extremity improve the pain. Overall, he is doing better.  He would like injections today.  Previous injections provided relief up until just a couple of weeks ago.    I have reviewed the following portions of the patient's history and agree with: History of Present Illness and Review of Systems    Patient Active Problem List   Diagnosis   • Severe obstructive sleep apnea   • Morbid obesity (HCC)   • Hypersomnia   • Type 2 diabetes mellitus (HCC)   • Essential hypertension   • Dyslipidemia   • Snoring   • Exertional dyspnea   • Dry cough   • Edema of both lower extremities   • Chest pain   • Bipolar 1 disorder (HCC)   • Anxiety   • Unstable angina (HCC)     Past Medical History:   Diagnosis Date   • Bipolar 1 disorder (HCC)    • Diabetes mellitus (HCC)    • Diverticulitis    • Hyperlipidemia    • Hypertension    • Mood disorder (HCC)    • Respiratory infection       Past Surgical History:   Procedure Laterality Date   • CARDIAC CATHETERIZATION  2012   • CARDIAC CATHETERIZATION N/A 7/13/2021    Procedure: Left Heart Cath;  Surgeon: Lois Levy MD;  Location: [facility] INVASIVE LOCATION;  Service: Cardiology;  Laterality: N/A;   • CHOLECYSTECTOMY     • COLONOSCOPY     • GALLBLADDER SURGERY     • SHOULDER SURGERY Right     REPAIR       Family History   Problem Relation  Age of Onset   • Cancer Mother    • Hypertension Mother    • Deep vein thrombosis Mother    • Diabetes Father    • Heart disease Father    • Hypertension Father    • COPD Father    • Heart failure Father      Social History     Socioeconomic History   • Marital status:    Tobacco Use   • Smoking status: Former Smoker     Packs/day: 1.00     Years: 8.00     Pack years: 8.00     Types: Cigarettes     Start date:      Quit date:      Years since quittin.4   • Smokeless tobacco: Never Used   Vaping Use   • Vaping Use: Never used   Substance and Sexual Activity   • Alcohol use: Never   • Drug use: Never   • Sexual activity: Defer      Current Outpatient Medications on File Prior to Visit   Medication Sig Dispense Refill   • albuterol (PROAIR RESPICLICK) 108 (90 Base) MCG/ACT inhaler ProAir HFA 90 mcg/actuation aerosol inhaler   Every six hours     • aspirin 81 MG EC tablet Take 81 mg by mouth Daily.     • bumetanide (BUMEX) 1 MG tablet bumetanide 1 mg tablet   Take 1 tablet twice a day for 3 days, then take 1 tablet daily     • buPROPion XL (WELLBUTRIN XL) 300 MG 24 hr tablet      • FLUoxetine (PROzac) 20 MG capsule Prozac 20 mg capsule   Take 1 capsule every day by oral route.     • hydroCHLOROthiazide (HYDRODIURIL) 25 MG tablet Take 25 mg by mouth Daily.     • Insulin Glargine (BASAGLAR KWIKPEN) 100 UNIT/ML injection pen      • Jardiance 25 MG tablet tablet Take 25 mg by mouth Every Morning.     • lamoTRIgine (LaMICtal) 200 MG tablet      • loratadine (CLARITIN) 10 MG tablet loratadine 10 mg tablet   Take 1 tablet every day by oral route as needed for 90 days.     • losartan (COZAAR) 100 MG tablet TAKE 1 TABLET BY MOUTH EVERY DAY WITH HYDROCHLOROTHIAZIDE 25 MG TABLET     • metFORMIN ER (GLUCOPHAGE-XR) 500 MG 24 hr tablet TAKE 2 TABLETS BY MOUTH IN THE MORNING AND 2 TABLETS AT NIGHT     • NOVOLOG FLEXPEN 100 UNIT/ML solution pen-injector sc pen      • ondansetron ODT (ZOFRAN-ODT) 8 MG disintegrating  tablet As Needed.     • pravastatin (PRAVACHOL) 40 MG tablet Take 40 mg by mouth Daily.     • propranolol (INDERAL) 20 MG tablet Take 1 tablet by mouth Every 12 (Twelve) Hours. 60 tablet 0   • VICTOZA 18 MG/3ML solution pen-injector        No current facility-administered medications on file prior to visit.      Allergies   Allergen Reactions   • Zestoretic [Lisinopril-Hydrochlorothiazide] Cough        Review of Systems   Constitutional: Negative for activity change, appetite change, chills, diaphoresis, fatigue, fever and unexpected weight change.   HENT: Negative for congestion, dental problem, drooling, ear discharge, ear pain, facial swelling, hearing loss, mouth sores, nosebleeds, postnasal drip, rhinorrhea, sinus pressure, sneezing, sore throat, tinnitus, trouble swallowing and voice change.    Eyes: Negative for photophobia, pain, discharge, redness, itching and visual disturbance.   Respiratory: Negative for apnea, cough, choking, chest tightness, shortness of breath, wheezing and stridor.    Cardiovascular: Negative for chest pain, palpitations and leg swelling.   Gastrointestinal: Negative for abdominal distention, abdominal pain, anal bleeding, blood in stool, constipation, diarrhea, nausea, rectal pain and vomiting.   Endocrine: Negative for cold intolerance, heat intolerance, polydipsia, polyphagia and polyuria.   Genitourinary: Negative for decreased urine volume, difficulty urinating, dysuria, enuresis, flank pain, frequency, genital sores, hematuria and urgency.   Musculoskeletal: Positive for arthralgias. Negative for back pain, gait problem, joint swelling, myalgias, neck pain and neck stiffness.   Skin: Negative for color change, pallor, rash and wound.   Allergic/Immunologic: Negative for environmental allergies, food allergies and immunocompromised state.   Neurological: Negative for dizziness, tremors, seizures, syncope, facial asymmetry, speech difficulty, weakness, light-headedness, numbness  "and headaches.   Hematological: Negative for adenopathy. Does not bruise/bleed easily.   Psychiatric/Behavioral: Negative for agitation, behavioral problems, confusion, decreased concentration, dysphoric mood, hallucinations, self-injury, sleep disturbance and suicidal ideas. The patient is not nervous/anxious and is not hyperactive.         Objective      Physical Exam  /80   Ht 177.8 cm (70\")   Wt (!) 163 kg (359 lb)   BMI 51.51 kg/m²     Body mass index is 51.51 kg/m².    General:   Mental Status:  Alert   Appearance: Cooperative, in no acute distress   Build and Nutrition: Obese by BMI male   Orientation: Alert and oriented to person, place and time   Posture: Normal   Gait: Nonantalgic    Integument:              Right knee: no skin lesions, no rash, no ecchymosis              Left knee: no skin lesions, no rash, no ecchymosis     Lower Extremities:              Right Knee:                          Tenderness:     Mild medial and lateral joint line tenderness                          Effusion:           None                          Swelling:          None                          Crepitus:          Positive                          Atrophy:           None                          Range of motion:        Extension:       0°                                                              Flexion:           120°  Instability:        No varus laxity, no valgus laxity, negative anterior drawer  Deformities:     Varus              Left Knee:                          Tenderness:    Mild medial and lateral joint line tenderness                          Effusion:          None                          Swelling:          None                          Crepitus:          Positive                          Atrophy:           None                          Range of motion:        Extension:       0°                                                              Flexion:           120°  Instability:        No varus laxity, " no valgus laxity, negative anterior drawer  Deformities:     Varus  Imaging/Studies  Imaging Results (Last 24 Hours)     ** No results found for the last 24 hours. **        No new imaging today.    Assessment and Plan     Diagnoses and all orders for this visit:    1. Primary osteoarthritis of both knees (Primary)  -     - Large Joint Arthrocentesis: bilateral knee    2. Obesity, morbid, BMI 40.0-49.9 (HCC)        1. Primary osteoarthritis of both knees    2. Obesity, morbid, BMI 40.0-49.9 (HCC)        I reviewed my findings with the patient.  Last injections helped up until just couple of weeks ago.  He would like repeat injections today, and these were provided.  I will see him back in 4 months, sooner for any problems.  We also discussed weight management again today.  The mass index has gone up since his last.    Procedure Note:  The potential benefits of performing a therapeutic bilateral knee joint injections, as well as potential risks (including, but not limited to infection, swelling, pain, bleeding, bruising, nerve/blood vessel damage, skin color changes, transient elevation in blood glucose levels, and fat atrophy) were discussed with the patient.  After informed consent, timeout procedure was performed, and the skin on the right and left knees was prepped with chlorhexidine soap and alcohol, after which ethyl chloride was applied to the skin at the injection site. Via the anterolateral approach, 1ml of Kenalog 40mg/ml mixed with 4ml 0.5% ropivacaine plain was injected into the knee joints.  The patient tolerated the procedures well, experiencing 90% improvement in the right knee and 90% improvement in the left knee a few minutes following the injections. There were no complications.  Band-Aids were applied to the injection sites. Post-procedural instructions were given to the patient and/or their caregiver.      Return in about 4 months (around 10/8/2022).      Nazario Mares MD  06/08/22  09:45  EDT

## 2022-09-19 ENCOUNTER — TELEPHONE (OUTPATIENT)
Dept: ORTHOPEDIC SURGERY | Facility: CLINIC | Age: 62
End: 2022-09-19

## 2022-10-31 ENCOUNTER — OFFICE VISIT (OUTPATIENT)
Dept: ORTHOPEDIC SURGERY | Facility: CLINIC | Age: 62
End: 2022-10-31

## 2022-10-31 VITALS
HEIGHT: 70 IN | WEIGHT: 315 LBS | DIASTOLIC BLOOD PRESSURE: 70 MMHG | SYSTOLIC BLOOD PRESSURE: 140 MMHG | BODY MASS INDEX: 45.1 KG/M2

## 2022-10-31 DIAGNOSIS — M17.0 PRIMARY OSTEOARTHRITIS OF BOTH KNEES: Primary | ICD-10-CM

## 2022-10-31 DIAGNOSIS — E66.01 OBESITY, MORBID, BMI 40.0-49.9: ICD-10-CM

## 2022-10-31 PROCEDURE — 20610 DRAIN/INJ JOINT/BURSA W/O US: CPT | Performed by: ORTHOPAEDIC SURGERY

## 2022-10-31 RX ORDER — SEMAGLUTIDE 1.34 MG/ML
INJECTION, SOLUTION SUBCUTANEOUS
COMMUNITY
Start: 2022-09-20 | End: 2023-02-27

## 2022-10-31 RX ORDER — ROPIVACAINE HYDROCHLORIDE 5 MG/ML
4 INJECTION, SOLUTION EPIDURAL; INFILTRATION; PERINEURAL
Status: COMPLETED | OUTPATIENT
Start: 2022-10-31 | End: 2022-10-31

## 2022-10-31 RX ORDER — TRIAMCINOLONE ACETONIDE 40 MG/ML
40 INJECTION, SUSPENSION INTRA-ARTICULAR; INTRAMUSCULAR
Status: COMPLETED | OUTPATIENT
Start: 2022-10-31 | End: 2022-10-31

## 2022-10-31 RX ADMIN — TRIAMCINOLONE ACETONIDE 40 MG: 40 INJECTION, SUSPENSION INTRA-ARTICULAR; INTRAMUSCULAR at 09:42

## 2022-10-31 RX ADMIN — ROPIVACAINE HYDROCHLORIDE 4 ML: 5 INJECTION, SOLUTION EPIDURAL; INFILTRATION; PERINEURAL at 09:42

## 2022-10-31 NOTE — PROGRESS NOTES
Procedure   - Large Joint Arthrocentesis: bilateral knee on 10/31/2022 9:42 AM  Indications: pain  Details: 22 G needle, anterolateral approach  Medications (Right): 4 mL ropivacaine 0.5 %; 40 mg triamcinolone acetonide 40 MG/ML  Medications (Left): 4 mL ropivacaine 0.5 %; 40 mg triamcinolone acetonide 40 MG/ML  Outcome: tolerated well, no immediate complications  Procedure, treatment alternatives, risks and benefits explained, specific risks discussed. Consent was given by the patient. Immediately prior to procedure a time out was called to verify the correct patient, procedure, equipment, support staff and site/side marked as required. Patient was prepped and draped in the usual sterile fashion.

## 2022-10-31 NOTE — PROGRESS NOTES
[facility] [place] [facility] Note    Subjective     Chief Complaint   Patient presents with   • Follow-up     5 months-  Primary osteoarthritis of both knees        HPI    It has been 5  month(s) since Mr. Chowdary's last visit. He returns to clinic today for follow-up of bilateral knee arthritis. The issue has been ongoing for 1.5 year(s). He rates his pain a 4/10 on the pain scale. Previous/current treatments: steroid injection (last injection 06/08/2022). Current symptoms: same as prior visit. The pain is worse with climbing stairs; resting improve the pain. Overall, he is doing the same.  Previous injections helped for about 3 months.  He would like repeat injections today.    I have reviewed the following portions of the patient's history and agree with: History of Present Illness and Review of Systems    Patient Active Problem List   Diagnosis   • Severe obstructive sleep apnea   • Morbid obesity (HCC)   • Hypersomnia   • Type 2 diabetes mellitus (HCC)   • Essential hypertension   • Dyslipidemia   • Snoring   • Exertional dyspnea   • Dry cough   • Edema of both lower extremities   • Chest pain   • Bipolar 1 disorder (HCC)   • Anxiety   • Unstable angina (HCC)     Past Medical History:   Diagnosis Date   • Bipolar 1 disorder (HCC)    • Diabetes mellitus (HCC)    • Diverticulitis    • Hyperlipidemia    • Hypertension    • Mood disorder (HCC)    • Respiratory infection       Past Surgical History:   Procedure Laterality Date   • CARDIAC CATHETERIZATION  2012   • CARDIAC CATHETERIZATION N/A 7/13/2021    Procedure: Left Heart Cath;  Surgeon: Lois Levy MD;  Location: [facility] CATH INVASIVE LOCATION;  Service: Cardiology;  Laterality: N/A;   • CHOLECYSTECTOMY     • COLONOSCOPY     • GALLBLADDER SURGERY     • SHOULDER SURGERY Right     REPAIR       Family History   Problem Relation Age of Onset   • Cancer Mother    • Hypertension Mother    • Deep vein thrombosis Mother    • Diabetes Father    • Heart  disease Father    • Hypertension Father    • COPD Father    • Heart failure Father      Social History     Socioeconomic History   • Marital status:    Tobacco Use   • Smoking status: Former     Packs/day: 1.00     Years: 8.00     Pack years: 8.00     Types: Cigarettes     Start date:      Quit date:      Years since quittin.8   • Smokeless tobacco: Never   Vaping Use   • Vaping Use: Never used   Substance and Sexual Activity   • Alcohol use: Never   • Drug use: Never   • Sexual activity: Defer      Current Outpatient Medications on File Prior to Visit   Medication Sig Dispense Refill   • albuterol (PROAIR RESPICLICK) 108 (90 Base) MCG/ACT inhaler ProAir HFA 90 mcg/actuation aerosol inhaler   Every six hours     • aspirin 81 MG EC tablet Take 81 mg by mouth Daily.     • bumetanide (BUMEX) 1 MG tablet bumetanide 1 mg tablet   Take 1 tablet twice a day for 3 days, then take 1 tablet daily     • buPROPion XL (WELLBUTRIN XL) 300 MG 24 hr tablet      • FLUoxetine (PROzac) 20 MG capsule Prozac 20 mg capsule   Take 1 capsule every day by oral route.     • hydroCHLOROthiazide (HYDRODIURIL) 25 MG tablet Take 25 mg by mouth Daily.     • Insulin Glargine (BASAGLAR KWIKPEN) 100 UNIT/ML injection pen      • Jardiance 25 MG tablet tablet Take 25 mg by mouth Every Morning.     • lamoTRIgine (LaMICtal) 200 MG tablet      • loratadine (CLARITIN) 10 MG tablet loratadine 10 mg tablet   Take 1 tablet every day by oral route as needed for 90 days.     • losartan (COZAAR) 100 MG tablet TAKE 1 TABLET BY MOUTH EVERY DAY WITH HYDROCHLOROTHIAZIDE 25 MG TABLET     • metFORMIN ER (GLUCOPHAGE-XR) 500 MG 24 hr tablet TAKE 2 TABLETS BY MOUTH IN THE MORNING AND 2 TABLETS AT NIGHT     • NOVOLOG FLEXPEN 100 UNIT/ML solution pen-injector sc pen      • ondansetron ODT (ZOFRAN-ODT) 8 MG disintegrating tablet As Needed.     • Ozempic, 0.25 or 0.5 MG/DOSE, 2 MG/1.5ML solution pen-injector Inject  0.25mg once weekly for 4 weeks, then  increase to 0.5mg once weekly thereafter     • pravastatin (PRAVACHOL) 40 MG tablet Take 40 mg by mouth Daily.     • [DISCONTINUED] propranolol (INDERAL) 20 MG tablet Take 1 tablet by mouth Every 12 (Twelve) Hours. 60 tablet 0   • [DISCONTINUED] VICTOZA 18 MG/3ML solution pen-injector        No current facility-administered medications on file prior to visit.      Allergies   Allergen Reactions   • Zestoretic [Lisinopril-Hydrochlorothiazide] Cough        Review of Systems   Constitutional: Negative.  Negative for activity change, appetite change, chills, diaphoresis, fatigue, fever and unexpected weight change.   HENT: Negative.  Negative for congestion, dental problem, drooling, ear discharge, ear pain, facial swelling, hearing loss, mouth sores, nosebleeds, postnasal drip, rhinorrhea, sinus pressure, sneezing, sore throat, tinnitus, trouble swallowing and voice change.    Eyes: Negative.  Negative for photophobia, pain, discharge, redness, itching and visual disturbance.   Respiratory: Negative.  Negative for apnea, cough, choking, chest tightness, shortness of breath, wheezing and stridor.    Cardiovascular: Negative.  Negative for chest pain, palpitations and leg swelling.   Gastrointestinal: Negative.  Negative for abdominal distention, abdominal pain, anal bleeding, blood in stool, constipation, diarrhea, nausea, rectal pain and vomiting.   Endocrine: Negative.  Negative for cold intolerance, heat intolerance, polydipsia, polyphagia and polyuria.   Genitourinary: Negative.  Negative for decreased urine volume, difficulty urinating, dysuria, enuresis, flank pain, frequency, genital sores, hematuria and urgency.   Musculoskeletal: Positive for arthralgias. Negative for back pain, gait problem, joint swelling, myalgias, neck pain and neck stiffness.   Skin: Negative.  Negative for color change, pallor, rash and wound.   Allergic/Immunologic: Negative.  Negative for environmental allergies, food allergies and  "immunocompromised state.   Neurological: Negative.  Negative for dizziness, tremors, seizures, syncope, facial asymmetry, speech difficulty, weakness, light-headedness, numbness and headaches.   Hematological: Negative.  Negative for adenopathy. Does not bruise/bleed easily.   Psychiatric/Behavioral: Negative.  Negative for agitation, behavioral problems, confusion, decreased concentration, dysphoric mood, hallucinations, self-injury, sleep disturbance and suicidal ideas. The patient is not nervous/anxious and is not hyperactive.         Objective      Physical Exam  /70   Ht 177.8 cm (70\")   Wt (!) 155 kg (342 lb 6.4 oz)   BMI 49.13 kg/m²     Body mass index is 49.13 kg/m².    General:   Mental Status:  Alert   Appearance: Cooperative, in no acute distress   Build and Nutrition: Obese by BMI male   Orientation: Alert and oriented to person, place and time   Posture: Normal   Gait: Nonantalgic    Integument:              Right knee: no skin lesions, no rash, no ecchymosis              Left knee: no skin lesions, no rash, no ecchymosis     Lower Extremities:              Right Knee:                          Tenderness:     None                          Effusion:           None                          Swelling:          None                          Crepitus:          Positive                          Atrophy:           None                          Range of motion:        Extension:       0°                                                              Flexion:           120°  Instability:        No varus laxity, no valgus laxity, negative anterior drawer  Deformities:     Varus              Left Knee:                          Tenderness:    None                          Effusion:          None                          Swelling:          None                          Crepitus:          Positive                          Atrophy:           None                          Range of motion: "        Extension:       0°                                                              Flexion:           120°  Instability:        No varus laxity, no valgus laxity, negative anterior drawer  Deformities:     Varus    Imaging/Studies  Imaging Results (Last 24 Hours)     Procedure Component Value Units Date/Time    XR Knee 4+ View Bilateral [[id number]] Resulted: 10/31/22 0941     Updated: 10/31/22 0942    Narrative:      Right Knee Radiographs  Indication: right knee pain  Views: Standing AP's and skiers of both knees, with lateral and sunrise   views of the right knee    Comparison: 9/8/2021    Findings:   Bone-on-bone contact medial compartment, tricompartmental osteophytes, no   acute bony abnormalities.  No unusual bony features.  Stable compared to   previous imaging.    Left Knee Radiographs  Indication: left knee pain  Views: Standing AP's and skiers of both knees, with lateral and sunrise   views of the left knee    Comparison: 9/8/2021    Findings:   Bone-on-bone contact medial compartment, tricompartmental osteophytes, no   acute bony abnormalities.  No unusual bony features.  Stable compared to   previous imaging.        No new imaging today.    Assessment and Plan     Diagnoses and all orders for this visit:    1. Primary osteoarthritis of both knees (Primary)  -     XR Knee 4+ View Bilateral  -     - Large Joint Arthrocentesis: bilateral knee    2. Obesity, morbid, BMI 40.0-49.9 (HCC)        1. Primary osteoarthritis of both knees    2. Obesity, morbid, BMI 40.0-49.9 (HCC)        I reviewed my findings with the patient.  He would like injections for both of his knees today, and this was provided.  Previous injections have provided relief.  I will see him back in 4 months, but sooner for any problems    Return in about 4 months (around 2/28/2023).      Nazario Mares MD  10/31/22  09:50 EDT

## 2023-02-27 ENCOUNTER — OFFICE VISIT (OUTPATIENT)
Dept: ORTHOPEDIC SURGERY | Facility: CLINIC | Age: 63
End: 2023-02-27
Payer: COMMERCIAL

## 2023-02-27 VITALS
DIASTOLIC BLOOD PRESSURE: 82 MMHG | SYSTOLIC BLOOD PRESSURE: 126 MMHG | WEIGHT: 315 LBS | HEIGHT: 70 IN | BODY MASS INDEX: 45.1 KG/M2

## 2023-02-27 DIAGNOSIS — M17.0 PRIMARY OSTEOARTHRITIS OF BOTH KNEES: Primary | ICD-10-CM

## 2023-02-27 DIAGNOSIS — E66.01 OBESITY, MORBID, BMI 40.0-49.9: ICD-10-CM

## 2023-02-27 PROCEDURE — 20610 DRAIN/INJ JOINT/BURSA W/O US: CPT | Performed by: ORTHOPAEDIC SURGERY

## 2023-02-27 RX ORDER — SEMAGLUTIDE 1.34 MG/ML
1 INJECTION, SOLUTION SUBCUTANEOUS
COMMUNITY

## 2023-02-27 RX ORDER — CETIRIZINE HYDROCHLORIDE 10 MG/1
TABLET ORAL AS NEEDED
COMMUNITY

## 2023-02-27 RX ORDER — TRIAMCINOLONE ACETONIDE 40 MG/ML
40 INJECTION, SUSPENSION INTRA-ARTICULAR; INTRAMUSCULAR
Status: COMPLETED | OUTPATIENT
Start: 2023-02-27 | End: 2023-02-27

## 2023-02-27 RX ORDER — FLUTICASONE PROPIONATE 50 MCG
SPRAY, SUSPENSION (ML) NASAL AS NEEDED
COMMUNITY

## 2023-02-27 RX ORDER — ROPIVACAINE HYDROCHLORIDE 5 MG/ML
4 INJECTION, SOLUTION EPIDURAL; INFILTRATION; PERINEURAL
Status: COMPLETED | OUTPATIENT
Start: 2023-02-27 | End: 2023-02-27

## 2023-02-27 RX ADMIN — TRIAMCINOLONE ACETONIDE 40 MG: 40 INJECTION, SUSPENSION INTRA-ARTICULAR; INTRAMUSCULAR at 08:32

## 2023-02-27 RX ADMIN — ROPIVACAINE HYDROCHLORIDE 4 ML: 5 INJECTION, SOLUTION EPIDURAL; INFILTRATION; PERINEURAL at 08:32

## 2023-02-27 NOTE — PROGRESS NOTES
Procedure   - Large Joint Arthrocentesis: bilateral knee on 2/27/2023 8:32 AM  Indications: pain  Details: (23) needle, anterolateral approach  Medications (Right): 4 mL ropivacaine 0.5 %; 40 mg triamcinolone acetonide 40 MG/ML  Medications (Left): 4 mL ropivacaine 0.5 %; 40 mg triamcinolone acetonide 40 MG/ML  Outcome: tolerated well, no immediate complications  Procedure, treatment alternatives, risks and benefits explained, specific risks discussed. Consent was given by the patient. Immediately prior to procedure a time out was called to verify the correct patient, procedure, equipment, support staff and site/side marked as required. Patient was prepped and draped in the usual sterile fashion.

## 2023-02-27 NOTE — PROGRESS NOTES
[facility] [place] [facility] [place]    Subjective     Chief Complaint   Patient presents with   • Follow-up     4 month follow up; Primary osteoarthritis of both knees         HPI    It has been 4  month(s) since Mr. Chowdary's last visit. He returns to clinic today for follow-up of bilateral knee arthritis. The issue has been ongoing for 2 year(s). He rates his pain a 4/10 on the pain scale. Previous/current treatments: steroid injection (last injection 10/31/2022). Current symptoms: same as prior visit. The pain is worse with standing; resting improve the pain. Overall, he is doing better.  He would like repeat injections today.    I have reviewed the following portions of the patient's history and agree with: History of Present Illness and Review of Systems    Patient Active Problem List   Diagnosis   • Severe obstructive sleep apnea   • Morbid obesity (HCC)   • Hypersomnia   • Type 2 diabetes mellitus (HCC)   • Essential hypertension   • Dyslipidemia   • Snoring   • Exertional dyspnea   • Dry cough   • Edema of both lower extremities   • Chest pain   • Bipolar 1 disorder (HCC)   • Anxiety   • Unstable angina (HCC)     Past Medical History:   Diagnosis Date   • Bipolar 1 disorder (HCC)    • Diabetes mellitus (HCC)    • Diverticulitis    • Hyperlipidemia    • Hypertension    • Mood disorder (HCC)    • Respiratory infection       Past Surgical History:   Procedure Laterality Date   • CARDIAC CATHETERIZATION  2012   • CARDIAC CATHETERIZATION N/A 7/13/2021    Procedure: Left Heart Cath;  Surgeon: Lois Levy MD;  [facility];  Service: Cardiology;  Laterality: N/A;   • CHOLECYSTECTOMY     • COLONOSCOPY     • GALLBLADDER SURGERY     • SHOULDER SURGERY Right     REPAIR       Family History   Problem Relation Age of Onset   • Cancer Mother    • Hypertension Mother    • Deep vein thrombosis Mother    • Diabetes Father    • Heart disease Father    • Hypertension Father    • COPD  Father    • Heart failure Father      Social History     Socioeconomic History   • Marital status:    Tobacco Use   • Smoking status: Former     Packs/day: 1.00     Years: 8.00     Pack years: 8.00     Types: Cigarettes     Start date:      Quit date:      Years since quittin.1   • Smokeless tobacco: Never   Vaping Use   • Vaping Use: Never used   Substance and Sexual Activity   • Alcohol use: Never   • Drug use: Never   • Sexual activity: Defer      Current Outpatient Medications on File Prior to Visit   Medication Sig Dispense Refill   • albuterol (PROAIR RESPICLICK) 108 (90 Base) MCG/ACT inhaler Inhale 2 puffs Every 4 (Four) Hours As Needed for Wheezing or Shortness of Air. 1 each 0   • aspirin 81 MG EC tablet Take 81 mg by mouth Daily.     • benzonatate (TESSALON) 200 MG capsule Take 1 capsule by mouth 3 (Three) Times a Day As Needed for Cough. 21 capsule 0   • bumetanide (BUMEX) 1 MG tablet bumetanide 1 mg tablet   Take 1 tablet twice a day for 3 days, then take 1 tablet daily     • buPROPion XL (WELLBUTRIN XL) 300 MG 24 hr tablet      • cetirizine (zyrTEC) 10 MG tablet As Needed.     • FLUoxetine (PROzac) 20 MG capsule Prozac 20 mg capsule   Take 1 capsule every day by oral route.     • fluticasone (FLONASE) 50 MCG/ACT nasal spray As Needed.     • Insulin Glargine (BASAGLAR KWIKPEN) 100 UNIT/ML injection pen      • Jardiance 25 MG tablet tablet Take 25 mg by mouth Every Morning.     • lamoTRIgine (LaMICtal) 200 MG tablet      • losartan (COZAAR) 100 MG tablet TAKE 1 TABLET BY MOUTH EVERY DAY WITH HYDROCHLOROTHIAZIDE 25 MG TABLET     • metFORMIN ER (GLUCOPHAGE-XR) 500 MG 24 hr tablet TAKE 2 TABLETS BY MOUTH IN THE MORNING AND 2 TABLETS AT NIGHT     • NOVOLOG FLEXPEN 100 UNIT/ML solution pen-injector sc pen      • ondansetron ODT (ZOFRAN-ODT) 8 MG disintegrating tablet As Needed.     • pravastatin (PRAVACHOL) 40 MG tablet Take 40 mg by mouth Daily.     • Semaglutide, 1 MG/DOSE, (Ozempic, 1  MG/DOSE,) 4 MG/3ML solution pen-injector 1 mg.     • [DISCONTINUED] Ozempic, 0.25 or 0.5 MG/DOSE, 2 MG/1.5ML solution pen-injector Inject  0.25mg once weekly for 4 weeks, then increase to 0.5mg once weekly thereafter       No current facility-administered medications on file prior to visit.      Allergies   Allergen Reactions   • Zestoretic [Lisinopril-Hydrochlorothiazide] Cough        Review of Systems   Constitutional: Negative for activity change, appetite change, chills, diaphoresis, fatigue, fever and unexpected weight change.   HENT: Negative for congestion, dental problem, drooling, ear discharge, ear pain, facial swelling, hearing loss, mouth sores, nosebleeds, postnasal drip, rhinorrhea, sinus pressure, sneezing, sore throat, tinnitus, trouble swallowing and voice change.    Eyes: Negative for photophobia, pain, discharge, redness, itching and visual disturbance.   Respiratory: Negative for apnea, cough, choking, chest tightness, shortness of breath, wheezing and stridor.    Cardiovascular: Negative for chest pain, palpitations and leg swelling.   Gastrointestinal: Negative for abdominal distention, abdominal pain, anal bleeding, blood in stool, constipation, diarrhea, nausea, rectal pain and vomiting.   Endocrine: Negative for cold intolerance, heat intolerance, polydipsia, polyphagia and polyuria.   Genitourinary: Negative for decreased urine volume, difficulty urinating, dysuria, enuresis, flank pain, frequency, genital sores, hematuria and urgency.   Musculoskeletal: Positive for arthralgias. Negative for back pain, gait problem, joint swelling, myalgias, neck pain and neck stiffness.   Skin: Negative for color change, pallor, rash and wound.   Allergic/Immunologic: Negative for environmental allergies, food allergies and immunocompromised state.   Neurological: Negative for dizziness, tremors, seizures, syncope, facial asymmetry, speech difficulty, weakness, light-headedness, numbness and headaches.  "  Hematological: Negative for adenopathy. Does not bruise/bleed easily.   Psychiatric/Behavioral: Negative for agitation, behavioral problems, confusion, decreased concentration, dysphoric mood, hallucinations, self-injury, sleep disturbance and suicidal ideas. The patient is not nervous/anxious and is not hyperactive.         Objective      Physical Exam  /82   Ht 177.8 cm (70\")   Wt (!) 151 kg (333 lb 9.6 oz)   BMI 47.87 kg/m²     Body mass index is 47.87 kg/m².    General:   Mental Status:  Alert   Appearance: Cooperative, in no acute distress   Build and Nutrition: Obese by BMI male   Orientation: Alert and oriented to person, place and time   Posture: Normal   Gait: Nonantalgic    Integument:              Right knee: no skin lesions, no rash, no ecchymosis              Left knee: no skin lesions, no rash, no ecchymosis     Lower Extremities:              Right Knee:                          Tenderness:     None                          Effusion:           None                          Swelling:          None                          Crepitus:          Positive                          Atrophy:           None                          Range of motion:        Extension:       0°                                                              Flexion:           120°  Instability:        No varus laxity, no valgus laxity, negative anterior drawer  Deformities:     Varus              Left Knee:                          Tenderness:    None                          Effusion:          None                          Swelling:          None                          Crepitus:          Positive                          Atrophy:           None                          Range of motion:        Extension:       0°                                                              Flexion:           120°  Instability:        No varus laxity, no valgus laxity, negative anterior " drawer  Deformities:     Varus    Imaging/Studies  Imaging Results (Last 24 Hours)     ** No results found for the last 24 hours. **            Assessment and Plan     Diagnoses and all orders for this visit:    1. Primary osteoarthritis of both knees (Primary)  -     - Large Joint Arthrocentesis: bilateral knee    2. Obesity, morbid, BMI 40.0-49.9 (HCC)        1. Primary osteoarthritis of both knees    2. Obesity, morbid, BMI 40.0-49.9 (HCC)        I reviewed my findings with the patient.  He would like repeat injections today, and these were provided.  He will continue to work on weight loss.  I will see him back in 4 months, but sooner for any problems    Procedure Note:  The potential benefits of performing a therapeutic bilateral knee joint injections, as well as potential risks (including, but not limited to infection, swelling, pain, bleeding, bruising, nerve/blood vessel damage, skin color changes, transient elevation in blood glucose levels, and fat atrophy) were discussed with the patient.  After informed consent, timeout procedure was performed, and the skin on the right and left knees was prepped with chlorhexidine soap and alcohol, after which ethyl chloride was applied to the skin at the injection site. Via the anterolateral approach, 1ml of Kenalog 40mg/ml mixed with 4ml 0.5% ropivacaine plain was injected into the knee joints.  The patient tolerated the procedures well, experiencing 80% improvement in the right knee and 80% improvement in the left knee a few minutes following the injections. There were no complications.  Band-Aids were applied to the injection sites. Post-procedural instructions were given to the patient and/or their caregiver.      Return in about 4 months (around 6/27/2023) for Recheck with X-Rays.      Nazario Mares MD  02/27/23  08:49 EST

## 2023-06-15 ENCOUNTER — OFFICE VISIT (OUTPATIENT)
Dept: SLEEP MEDICINE | Facility: HOSPITAL | Age: 63
End: 2023-06-15
Payer: COMMERCIAL

## 2023-06-15 VITALS
DIASTOLIC BLOOD PRESSURE: 65 MMHG | BODY MASS INDEX: 44.1 KG/M2 | HEART RATE: 83 BPM | HEIGHT: 71 IN | WEIGHT: 315 LBS | SYSTOLIC BLOOD PRESSURE: 139 MMHG | OXYGEN SATURATION: 95 %

## 2023-06-15 DIAGNOSIS — G47.33 OSA (OBSTRUCTIVE SLEEP APNEA): Primary | ICD-10-CM

## 2023-06-15 NOTE — PROGRESS NOTES
Chief Complaint:   Chief Complaint   Patient presents with    Sleeping Problem       HPI:    Joselito Chowdary III is a 63 y.o. male here to reestablish care.  Patient does see Dr. Meme Mccollum as primary care provider.  Patient does have a history as below.    Past Medical History:   Diagnosis Date    Bipolar 1 disorder     Diabetes mellitus     Diverticulitis     Hyperlipidemia     Hypertension     Mood disorder     Respiratory infection      Before starting CPAP patient did have snoring, and witnessed apneas.  Patient states he is very well controlled with CPAP and is doing very well.  He thinks he has been on CPAP since approximately 2015.  Patient does deny memory loss, lack of concentration, acting out dreams, nasal fracture, hypnagogue hallucinations and sleep paralysis.  Patient did have a concussion as a young child.    Patient will sleep 11 PM to 7 AM he does work 3 night 12-hour shifts per week so we can sleep is varied.  And will take him approximately 30 minutes to go to sleep and he will get anywhere from 6 to 8 hours a night.  Patient states he does get up 1-2 times in the night to use the restroom.  Patient will usually take in 1 to 2-hour nap daily.  Patient has an [place] score of 9/24.    Social history  This is a very pleasant 63-year-old male reestablishing care.  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  Patient is a non-smoker and denies illicit substance use.  He will have an alcoholic beverage monthly or less.  He will drink 1 cup of regular coffee daily, 5 cups of regular tea, and 3 cups of regular cola.    .  Past Surgical History:   Procedure Laterality Date    CARDIAC CATHETERIZATION  2012    CARDIAC CATHETERIZATION N/A 7/13/2021    Procedure: Left Heart Cath;  Surgeon: Lois Levy MD;  Location: [facility] CATH INVASIVE LOCATION;  Service: Cardiology;  Laterality: N/A;    CHOLECYSTECTOMY      COLONOSCOPY      GALLBLADDER SURGERY      SHOULDER SURGERY Right     REPAIR       Family History   Problem Relation Age of Onset    Cancer Mother     Hypertension Mother     Deep vein thrombosis Mother     Diabetes Father     Heart disease Father     Hypertension Father     COPD Father     Heart failure Father          Current medications are:   Current Outpatient Medications:     albuterol (PROAIR RESPICLICK) 108 (90 Base) MCG/ACT inhaler, Inhale 2 puffs Every 4 (Four) Hours As Needed for Wheezing or Shortness of Air., Disp: 1 each, Rfl: 0    aspirin 81 MG EC tablet, Take 81 mg by mouth Daily., Disp: , Rfl:     benzonatate (TESSALON) 200 MG capsule, Take 1 capsule by mouth 3 (Three) Times a Day As Needed for Cough., Disp: 21 capsule, Rfl: 0    bumetanide (BUMEX) 1 MG tablet, bumetanide 1 mg tablet  Take 1 tablet twice a day for 3 days, then take 1 tablet daily, Disp: , Rfl:     buPROPion XL (WELLBUTRIN XL) 300 MG 24 hr tablet, , Disp: , Rfl:     cetirizine (zyrTEC) 10 MG tablet, As Needed., Disp: , Rfl:     FLUoxetine (PROzac) 20 MG capsule, Prozac 20 mg capsule  Take 1 capsule every day by oral route., Disp: , Rfl:     fluticasone (FLONASE) 50 MCG/ACT nasal spray, As Needed., Disp: , Rfl:     Insulin Glargine (BASAGLAR KWIKPEN) 100 UNIT/ML injection pen, , Disp: , Rfl:     Jardiance 25 MG tablet tablet, Take 25 mg by mouth Every Morning., Disp: , Rfl:     lamoTRIgine (LaMICtal) 200 MG tablet, , Disp: , Rfl:     losartan (COZAAR) 100 MG tablet, TAKE 1 TABLET BY MOUTH EVERY DAY WITH HYDROCHLOROTHIAZIDE 25 MG TABLET, Disp: , Rfl:     metFORMIN ER (GLUCOPHAGE-XR) 500 MG 24 hr tablet, TAKE 2 TABLETS BY MOUTH IN THE MORNING AND 2 TABLETS AT NIGHT, Disp: , Rfl:     NOVOLOG FLEXPEN 100 UNIT/ML solution pen-injector sc pen, , Disp: , Rfl:     ondansetron ODT (ZOFRAN-ODT) 8 MG disintegrating tablet, As Needed., Disp: , Rfl:     pravastatin (PRAVACHOL) 40 MG tablet, Take 40 mg by mouth Daily., Disp: , Rfl:     Semaglutide, 1 MG/DOSE, (Ozempic, 1 MG/DOSE,) 4 MG/3ML solution pen-injector, 1 mg., Disp: ,  Rfl: .      The patient's relevant past medical, surgical, family and social history were reviewed and updated in Epic as appropriate.       Review of Systems   Constitutional:  Positive for fatigue.   Eyes:  Positive for itching.   Respiratory:  Positive for apnea.    Cardiovascular:  Positive for leg swelling.   Gastrointestinal:  Positive for diarrhea and nausea.   Genitourinary:  Positive for frequency.        Decreased libido   Psychiatric/Behavioral:  Positive for sleep disturbance. The patient is nervous/anxious.    All other systems reviewed and are negative.      Objective:    Physical Exam  Constitutional:       Appearance: Normal appearance.   HENT:      Head: Normocephalic and atraumatic.      Mouth/Throat:      Comments: Mallampati 3 anatomy  Cardiovascular:      Rate and Rhythm: Normal rate and regular rhythm.   Pulmonary:      Effort: Pulmonary effort is normal.      Breath sounds: Normal breath sounds.   Skin:     General: Skin is warm and dry.   Neurological:      Mental Status: He is alert and oriented to person, place, and time.   Psychiatric:         Mood and Affect: Mood normal.         Behavior: Behavior normal.         Thought Content: Thought content normal.         Judgment: Judgment normal.       CPAP Report    Did not bring his chip today  The patient continues to use and benefit from CPAP therapy.    ASSESSMENT/PLAN    Diagnoses and all orders for this visit:    1. GT (obstructive sleep apnea) (Primary)  -     PAP Therapy        Counseled patient regarding multimodal approach with healthy nutrition, healthy sleep, regular physical activity, social activities, counseling, and medications. Encouraged to practice lateral sleep position. Avoid alcohol and sedatives close to bedtime.  We will get new order sent to [facility] in [place] as this is where he would like to go from  I will see him back in 3 months to reassess.      I have reviewed the results of my evaluation and impression and  discussed my recommendations in detail with the patient.      Signed by  Marissa Asher, APRN    Trena 15, 2023      CC: Meme Mccollum MD         Referring, Self

## 2023-09-08 ENCOUNTER — OFFICE VISIT (OUTPATIENT)
Dept: SLEEP MEDICINE | Facility: CLINIC | Age: 63
End: 2023-09-08
Payer: COMMERCIAL

## 2023-09-08 VITALS
HEIGHT: 70 IN | SYSTOLIC BLOOD PRESSURE: 130 MMHG | DIASTOLIC BLOOD PRESSURE: 79 MMHG | WEIGHT: 315 LBS | HEART RATE: 74 BPM | OXYGEN SATURATION: 97 % | BODY MASS INDEX: 45.1 KG/M2 | TEMPERATURE: 98 F

## 2023-09-08 DIAGNOSIS — G47.33 OSA (OBSTRUCTIVE SLEEP APNEA): Primary | ICD-10-CM

## 2023-09-08 PROCEDURE — 99213 OFFICE O/P EST LOW 20 MIN: CPT | Performed by: NURSE PRACTITIONER

## 2023-09-08 NOTE — PROGRESS NOTES
Chief Complaint:   Chief Complaint   Patient presents with    Follow-up       HPI:    Joselito Chowdary III is a 63 y.o. male here for follow-up of sleep apnea.  Patient was last seen 6/15/2023 and did need an order for a new machine.  Patient states he is doing very well.  Patient is sleeping 6 to 7 hours nightly and does feel rested upon awakening.  Patient goes to sleep quickly and does not get up x2.  Patient has an [place] score of 7/24.  Patient has no concerns or complaints and wishes to continue CPAP therapy.        Current medications are:   Current Outpatient Medications:     albuterol (PROAIR RESPICLICK) 108 (90 Base) MCG/ACT inhaler, Inhale 2 puffs Every 4 (Four) Hours As Needed for Wheezing or Shortness of Air., Disp: 1 each, Rfl: 0    aspirin 81 MG EC tablet, Take 1 tablet by mouth Daily., Disp: , Rfl:     benzonatate (TESSALON) 200 MG capsule, Take 1 capsule by mouth 3 (Three) Times a Day As Needed for Cough., Disp: 21 capsule, Rfl: 0    bumetanide (BUMEX) 1 MG tablet, bumetanide 1 mg tablet  Take 1 tablet twice a day for 3 days, then take 1 tablet daily, Disp: , Rfl:     buPROPion XL (WELLBUTRIN XL) 300 MG 24 hr tablet, , Disp: , Rfl:     cetirizine (zyrTEC) 10 MG tablet, As Needed., Disp: , Rfl:     FLUoxetine (PROzac) 20 MG capsule, Prozac 20 mg capsule  Take 1 capsule every day by oral route., Disp: , Rfl:     fluticasone (FLONASE) 50 MCG/ACT nasal spray, As Needed., Disp: , Rfl:     Insulin Glargine (BASAGLAR KWIKPEN SC), Inject 50 Units into the appropriate muscle as directed by prescriber Daily., Disp: , Rfl:     Jardiance 25 MG tablet tablet, Take 1 tablet by mouth Every Morning., Disp: , Rfl:     lamoTRIgine (LaMICtal) 200 MG tablet, , Disp: , Rfl:     losartan (COZAAR) 100 MG tablet, TAKE 1 TABLET BY MOUTH EVERY DAY WITH HYDROCHLOROTHIAZIDE 25 MG TABLET, Disp: , Rfl:     metFORMIN ER (GLUCOPHAGE-XR) 500 MG 24 hr tablet, TAKE 2 TABLETS BY MOUTH IN THE MORNING AND 2 TABLETS AT NIGHT, Disp: ,  Rfl:     NOVOLOG FLEXPEN 100 UNIT/ML solution pen-injector sc pen, , Disp: , Rfl:     ondansetron ODT (ZOFRAN-ODT) 8 MG disintegrating tablet, As Needed., Disp: , Rfl:     pravastatin (PRAVACHOL) 40 MG tablet, Take 1 tablet by mouth Daily., Disp: , Rfl:     Semaglutide, 1 MG/DOSE, (Ozempic, 1 MG/DOSE,) 4 MG/3ML solution pen-injector, 1 mg., Disp: , Rfl: .      The patient's relevant past medical, surgical, family and social history were reviewed and updated in Epic as appropriate.       Review of Systems   Eyes:  Positive for itching.   Respiratory:  Positive for apnea.    Gastrointestinal:  Positive for diarrhea.   Genitourinary:  Positive for frequency.   Psychiatric/Behavioral:  Positive for sleep disturbance. The patient is nervous/anxious.    All other systems reviewed and are negative.      Objective:    Physical Exam  Constitutional:       Appearance: Normal appearance.   HENT:      Head: Normocephalic and atraumatic.      Mouth/Throat:      Comments: Class 3 airway  Cardiovascular:      Rate and Rhythm: Normal rate and regular rhythm.   Pulmonary:      Effort: Pulmonary effort is normal.      Breath sounds: Normal breath sounds.   Skin:     General: Skin is warm and dry.   Neurological:      Mental Status: He is alert and oriented to person, place, and time.   Psychiatric:         Mood and Affect: Mood normal.         Behavior: Behavior normal.         Thought Content: Thought content normal.         Judgment: Judgment normal.       CPAP Report    63/90 is of use  Greater than 4-hour use 60%  Settings 14-20  AHI 4.1  95th percentile pressure 15.3  The patient continues to use and benefit from CPAP therapy.    ASSESSMENT/PLAN    Diagnoses and all orders for this visit:    1. GT (obstructive sleep apnea) (Primary)  -     PAP Therapy        Counseled patient regarding multimodal approach with healthy nutrition, healthy sleep, regular physical activity, social activities, counseling, and medications. Encouraged  to practice lateral sleep position. Avoid alcohol and sedatives close to bedtime.        I have reviewed the results of my evaluation and impression and discussed my recommendations in detail with the patient.      Signed by  MELVIN Valenzuela    September 8, 2023      CC: Meme Mccollum MD         No ref. provider found

## 2023-10-30 ENCOUNTER — OFFICE VISIT (OUTPATIENT)
Dept: ORTHOPEDIC SURGERY | Facility: CLINIC | Age: 63
End: 2023-10-30
Payer: COMMERCIAL

## 2023-10-30 VITALS
WEIGHT: 315 LBS | BODY MASS INDEX: 45.1 KG/M2 | HEIGHT: 70 IN | DIASTOLIC BLOOD PRESSURE: 82 MMHG | SYSTOLIC BLOOD PRESSURE: 124 MMHG

## 2023-10-30 DIAGNOSIS — E66.01 OBESITY, MORBID, BMI 40.0-49.9: ICD-10-CM

## 2023-10-30 DIAGNOSIS — M17.0 PRIMARY OSTEOARTHRITIS OF BOTH KNEES: Primary | ICD-10-CM

## 2023-10-30 PROCEDURE — 20610 DRAIN/INJ JOINT/BURSA W/O US: CPT | Performed by: ORTHOPAEDIC SURGERY

## 2023-10-30 RX ORDER — TIRZEPATIDE 5 MG/.5ML
INJECTION, SOLUTION SUBCUTANEOUS
COMMUNITY
Start: 2023-09-19

## 2023-10-30 RX ORDER — TRIAMCINOLONE ACETONIDE 40 MG/ML
40 INJECTION, SUSPENSION INTRA-ARTICULAR; INTRAMUSCULAR
Status: COMPLETED | OUTPATIENT
Start: 2023-10-30 | End: 2023-10-30

## 2023-10-30 RX ORDER — ROPIVACAINE HYDROCHLORIDE 5 MG/ML
4 INJECTION, SOLUTION EPIDURAL; INFILTRATION; PERINEURAL
Status: COMPLETED | OUTPATIENT
Start: 2023-10-30 | End: 2023-10-30

## 2023-10-30 RX ADMIN — ROPIVACAINE HYDROCHLORIDE 4 ML: 5 INJECTION, SOLUTION EPIDURAL; INFILTRATION; PERINEURAL at 09:38

## 2023-10-30 RX ADMIN — TRIAMCINOLONE ACETONIDE 40 MG: 40 INJECTION, SUSPENSION INTRA-ARTICULAR; INTRAMUSCULAR at 09:38

## 2023-10-30 NOTE — PROGRESS NOTES
Procedure   - Large Joint Arthrocentesis: bilateral knee on 10/30/2023 9:38 AM  Indications: pain  Details: 21 G needle, anterolateral approach  Medications (Right): 4 mL ropivacaine 0.5 %; 40 mg triamcinolone acetonide 40 MG/ML  Medications (Left): 4 mL ropivacaine 0.5 %; 40 mg triamcinolone acetonide 40 MG/ML  Outcome: tolerated well, no immediate complications  Procedure, treatment alternatives, risks and benefits explained, specific risks discussed. Consent was given by the patient. Immediately prior to procedure a time out was called to verify the correct patient, procedure, equipment, support staff and site/side marked as required. Patient was prepped and draped in the usual sterile fashion.

## 2023-10-30 NOTE — PROGRESS NOTES
[facility]    Subjective     Chief Complaint   Patient presents with    Follow-up     4 month follow-up: Primary osteoarthritis of both knees        HPI    It has been 4  month(s) since Mr. Chowdary's last visit. He returns to clinic today for follow-up of bilateral knee pain. The issue has been ongoing for 2 year(s). He rates his pain a 5/10 on the pain scale. Previous/current treatments: steroid injection (last injection 6/28/23). Current symptoms: same as prior visit. The pain is worse with climbing stairs; resting improve the pain. Overall, he is doing the same.  He would like a repeat injection today.  Previous injection helped up until just recently.    I have reviewed the following portions of the patient's history and agree with: History of Present Illness and Review of Systems    Patient Active Problem List   Diagnosis    Severe obstructive sleep apnea    Morbid obesity    Hypersomnia    Type 2 diabetes mellitus    Essential hypertension    Dyslipidemia    Snoring    Exertional dyspnea    Dry cough    Edema of both lower extremities    Chest pain    Bipolar 1 disorder    Anxiety    Unstable angina     Past Medical History:   Diagnosis Date    Acromioclavicular separation 04/2018    Same as above problem    Bipolar 1 disorder     Diabetes mellitus     Dislocation, shoulder 04/2018    Same    Diverticulitis     Hyperlipidemia     Hypertension     Mood disorder     Respiratory infection     Rotator cuff syndrome 04/2018    R Shoulder      Past Surgical History:   Procedure Laterality Date    CARDIAC CATHETERIZATION  2012    CARDIAC CATHETERIZATION N/A 07/13/2021    Procedure: Left Heart Cath;  Surgeon: Lois Levy MD;  Location: [facility] CATH INVASIVE LOCATION;  Service: Cardiology;  Laterality: N/A;    CHOLECYSTECTOMY      COLONOSCOPY      GALLBLADDER SURGERY      SHOULDER SURGERY Right     REPAIR     TRIGGER POINT INJECTION      First and subsequent visits      Family  History   Problem Relation Age of Onset    Cancer Mother     Hypertension Mother     Deep vein thrombosis Mother     Diabetes Father     Heart disease Father     Hypertension Father     COPD Father     Heart failure Father      Social History     Socioeconomic History    Marital status:    Tobacco Use    Smoking status: Former     Packs/day: 1.00     Years: 10.00     Additional pack years: 0.00     Total pack years: 10.00     Types: Cigarettes     Start date: 1978     Quit date: 1986     Years since quittin.8    Smokeless tobacco: Never   Vaping Use    Vaping Use: Never used   Substance and Sexual Activity    Alcohol use: Not Currently     Comment: Occasional drink    Drug use: Never    Sexual activity: Not Currently     Partners: Female     Birth control/protection: None      Current Outpatient Medications on File Prior to Visit   Medication Sig Dispense Refill    albuterol (PROAIR RESPICLICK) 108 (90 Base) MCG/ACT inhaler Inhale 2 puffs Every 4 (Four) Hours As Needed for Wheezing or Shortness of Air. 1 each 0    aspirin 81 MG EC tablet Take 1 tablet by mouth Daily.      benzonatate (TESSALON) 200 MG capsule Take 1 capsule by mouth 3 (Three) Times a Day As Needed for Cough. 21 capsule 0    bumetanide (BUMEX) 1 MG tablet bumetanide 1 mg tablet   Take 1 tablet twice a day for 3 days, then take 1 tablet daily      buPROPion XL (WELLBUTRIN XL) 300 MG 24 hr tablet       cetirizine (zyrTEC) 10 MG tablet As Needed.      FLUoxetine (PROzac) 20 MG capsule Prozac 20 mg capsule   Take 1 capsule every day by oral route.      fluticasone (FLONASE) 50 MCG/ACT nasal spray As Needed.      Insulin Glargine (BASAGLAR KWIKPEN SC) Inject 50 Units into the appropriate muscle as directed by prescriber Daily.      Jardiance 25 MG tablet tablet Take 1 tablet by mouth Every Morning.      lamoTRIgine (LaMICtal) 200 MG tablet       losartan (COZAAR) 100 MG tablet TAKE 1 TABLET BY MOUTH EVERY DAY WITH HYDROCHLOROTHIAZIDE  25 MG TABLET      metFORMIN ER (GLUCOPHAGE-XR) 500 MG 24 hr tablet TAKE 2 TABLETS BY MOUTH IN THE MORNING AND 2 TABLETS AT NIGHT      Mounjaro 5 MG/0.5ML solution pen-injector INJECT 5 MG (0.5ml) UNDER THE SKIN INTO THE APPROPRIATE AREA AS DIRECTED ONE TIME PER WEEK      NOVOLOG FLEXPEN 100 UNIT/ML solution pen-injector sc pen       ondansetron ODT (ZOFRAN-ODT) 8 MG disintegrating tablet As Needed.      pravastatin (PRAVACHOL) 40 MG tablet Take 1 tablet by mouth Daily.      [DISCONTINUED] Semaglutide, 1 MG/DOSE, (Ozempic, 1 MG/DOSE,) 4 MG/3ML solution pen-injector 1 mg.       No current facility-administered medications on file prior to visit.      Allergies   Allergen Reactions    Zestoretic [Lisinopril-Hydrochlorothiazide] Cough        Review of Systems   Constitutional:  Negative for activity change, appetite change, chills, diaphoresis, fatigue, fever and unexpected weight change.   HENT:  Negative for congestion, dental problem, drooling, ear discharge, ear pain, facial swelling, hearing loss, mouth sores, nosebleeds, postnasal drip, rhinorrhea, sinus pressure, sneezing, sore throat, tinnitus, trouble swallowing and voice change.    Eyes:  Negative for photophobia, pain, discharge, redness, itching and visual disturbance.   Respiratory:  Negative for apnea, cough, choking, chest tightness, shortness of breath, wheezing and stridor.    Cardiovascular:  Negative for chest pain, palpitations and leg swelling.   Gastrointestinal:  Negative for abdominal distention, abdominal pain, anal bleeding, blood in stool, constipation, diarrhea, nausea, rectal pain and vomiting.   Endocrine: Negative for cold intolerance, heat intolerance, polydipsia, polyphagia and polyuria.   Genitourinary:  Negative for decreased urine volume, difficulty urinating, dysuria, enuresis, flank pain, frequency, genital sores, hematuria and urgency.   Musculoskeletal:  Positive for arthralgias. Negative for back pain, gait problem, joint  "swelling, myalgias, neck pain and neck stiffness.   Skin:  Negative for color change, pallor, rash and wound.   Allergic/Immunologic: Negative for environmental allergies, food allergies and immunocompromised state.   Neurological:  Negative for dizziness, tremors, seizures, syncope, facial asymmetry, speech difficulty, weakness, light-headedness, numbness and headaches.   Hematological:  Negative for adenopathy. Does not bruise/bleed easily.   Psychiatric/Behavioral:  Negative for agitation, behavioral problems, confusion, decreased concentration, dysphoric mood, hallucinations, self-injury, sleep disturbance and suicidal ideas. The patient is not nervous/anxious and is not hyperactive.         Objective      Physical Exam  /82   Ht 177.8 cm (70\")   Wt (!) 148 kg (325 lb 6.4 oz)   BMI 46.69 kg/m²     Body mass index is 46.69 kg/m².    General:   Mental Status:  Alert   Appearance: Cooperative, in no acute distress   Build and Nutrition: Obese by BMI male   Orientation: Alert and oriented to person, place and time   Posture: Normal   Gait: Nonantalgic    Integument:              Right knee: no skin lesions, no rash, no ecchymosis              Left knee: no skin lesions, no rash, no ecchymosis     Lower Extremities:              Right Knee:                          Tenderness:     None                          Effusion:           None                          Swelling:          None                          Crepitus:          Positive                          Atrophy:           None                          Range of motion:        Extension:       0°                                                              Flexion:           120°  Instability:        No varus laxity, no valgus laxity, negative anterior drawer  Deformities:     Varus              Left Knee:                          Tenderness:    None                          Effusion:          None                          Swelling:          None         "                  Crepitus:          Positive                          Atrophy:           None                          Range of motion:        Extension:       0°                                                              Flexion:           120°  Instability:        No varus laxity, no valgus laxity, negative anterior drawer  Deformities:     Varus    Imaging/Studies  Imaging Results (Last 24 Hours)       ** No results found for the last 24 hours. **          No new imaging today.    Assessment and Plan     Diagnoses and all orders for this visit:    1. Primary osteoarthritis of both knees (Primary)  -     - Large Joint Arthrocentesis: bilateral knee    2. Obesity, morbid, BMI 40.0-49.9        1. Primary osteoarthritis of both knees    2. Obesity, morbid, BMI 40.0-49.9        I reviewed my findings with the patient.  We discussed treatment options for his knees again today, and he would like to proceed with injections.  We discussed weight loss again today.  I will see him back in 4 months, but sooner for any problems.    Procedure Note:  The potential benefits of performing a therapeutic bilateral knee joint injections, as well as potential risks (including, but not limited to infection, swelling, pain, bleeding, bruising, nerve/blood vessel damage, skin color changes, transient elevation in blood glucose levels, and fat atrophy) were discussed with the patient.  After informed consent, timeout procedure was performed, and the skin on the right and left knees was prepped with chlorhexidine soap and alcohol, after which ethyl chloride was applied to the skin at the injection site. Via the anterolateral approach, 1ml of Kenalog 40mg/ml mixed with 4ml 0.5% ropivacaine plain was injected into the knee joints.  The patient tolerated the procedures well, experiencing 75% improvement in the right knee and 75% improvement in the left knee a few minutes following the injections. There were no complications.  Band-Aids  were applied to the injection sites. Post-procedural instructions were given to the patient and/or their caregiver.      Return in about 4 months (around 2/29/2024).      Nazario Mares MD  10/30/23  09:55 EDT

## 2024-03-04 ENCOUNTER — OFFICE VISIT (OUTPATIENT)
Dept: ORTHOPEDIC SURGERY | Facility: CLINIC | Age: 64
End: 2024-03-04
Payer: COMMERCIAL

## 2024-03-04 VITALS
HEIGHT: 70 IN | WEIGHT: 315 LBS | DIASTOLIC BLOOD PRESSURE: 80 MMHG | BODY MASS INDEX: 45.1 KG/M2 | SYSTOLIC BLOOD PRESSURE: 136 MMHG

## 2024-03-04 DIAGNOSIS — E66.01 OBESITY, CLASS III, BMI 40-49.9 (MORBID OBESITY): ICD-10-CM

## 2024-03-04 DIAGNOSIS — M17.0 PRIMARY OSTEOARTHRITIS OF BOTH KNEES: Primary | ICD-10-CM

## 2024-03-04 PROCEDURE — 20610 DRAIN/INJ JOINT/BURSA W/O US: CPT | Performed by: ORTHOPAEDIC SURGERY

## 2024-03-04 RX ORDER — TRIAMCINOLONE ACETONIDE 40 MG/ML
40 INJECTION, SUSPENSION INTRA-ARTICULAR; INTRAMUSCULAR
Status: COMPLETED | OUTPATIENT
Start: 2024-03-04 | End: 2024-03-04

## 2024-03-04 RX ORDER — ROPIVACAINE HYDROCHLORIDE 5 MG/ML
4 INJECTION, SOLUTION EPIDURAL; INFILTRATION; PERINEURAL
Status: COMPLETED | OUTPATIENT
Start: 2024-03-04 | End: 2024-03-04

## 2024-03-04 RX ORDER — SEMAGLUTIDE 1.34 MG/ML
INJECTION, SOLUTION SUBCUTANEOUS
COMMUNITY
Start: 2023-06-20 | End: 2024-03-04

## 2024-03-04 RX ADMIN — ROPIVACAINE HYDROCHLORIDE 4 ML: 5 INJECTION, SOLUTION EPIDURAL; INFILTRATION; PERINEURAL at 08:50

## 2024-03-04 RX ADMIN — TRIAMCINOLONE ACETONIDE 40 MG: 40 INJECTION, SUSPENSION INTRA-ARTICULAR; INTRAMUSCULAR at 08:50

## 2024-03-04 NOTE — PROGRESS NOTES
[facility]    Subjective     Chief Complaint   Patient presents with    Follow-up     4 month follow-up: Primary osteoarthritis of both knees        HPI    It has been 4  month(s) since Mr. Chowdary's last visit. He returns to clinic today for follow-up of bilateral knee arthritis. The issue has been ongoing for 4 year(s). He rates his pain a 3/10 on the pain scale. Previous/current treatments: steroid injection (last injection 10/30/23). Current symptoms: pain and same as prior visit. The pain is worse with walking, standing, and climbing stairs; elevating the extremity improve the pain. Overall, he is doing the same.  He would like repeat injections today.  Previous injections provided relief for about 2 months.  He continues to work on weight loss.    I have reviewed the following portions of the patient's history and agree with: History of Present Illness and Review of Systems    Patient Active Problem List   Diagnosis    Severe obstructive sleep apnea    Morbid obesity    Hypersomnia    Type 2 diabetes mellitus    Essential hypertension    Dyslipidemia    Snoring    Exertional dyspnea    Dry cough    Edema of both lower extremities    Chest pain    Bipolar 1 disorder    Anxiety    Unstable angina     Past Medical History:   Diagnosis Date    Acromioclavicular separation 04/2018    Same as above problem    Bipolar 1 disorder     Diabetes mellitus     Dislocation, shoulder 04/2018    Same    Diverticulitis     Hyperlipidemia     Hypertension     Mood disorder     Respiratory infection     Rotator cuff syndrome 04/2018    R Shoulder      Past Surgical History:   Procedure Laterality Date    CARDIAC CATHETERIZATION  2012    CARDIAC CATHETERIZATION N/A 07/13/2021    Procedure: Left Heart Cath;  Surgeon: Lois Levy MD;  Location: [facility] CATH INVASIVE LOCATION;  Service: Cardiology;  Laterality: N/A;    CHOLECYSTECTOMY      COLONOSCOPY      GALLBLADDER SURGERY      SHOULDER  SURGERY Right     REPAIR     TRIGGER POINT INJECTION      First and subsequent visits      Family History   Problem Relation Age of Onset    Cancer Mother     Hypertension Mother     Deep vein thrombosis Mother     Diabetes Father     Heart disease Father     Hypertension Father     COPD Father     Heart failure Father      Social History     Socioeconomic History    Marital status:    Tobacco Use    Smoking status: Former     Current packs/day: 0.00     Average packs/day: 1 pack/day for 10.0 years (10.0 ttl pk-yrs)     Types: Cigarettes     Start date: 1978     Quit date: 1986     Years since quittin.1    Smokeless tobacco: Never   Vaping Use    Vaping status: Never Used   Substance and Sexual Activity    Alcohol use: Not Currently     Comment: Occasional drink    Drug use: Never    Sexual activity: Not Currently     Partners: Female     Birth control/protection: None      Current Outpatient Medications on File Prior to Visit   Medication Sig Dispense Refill    albuterol (PROAIR RESPICLICK) 108 (90 Base) MCG/ACT inhaler Inhale 2 puffs Every 4 (Four) Hours As Needed for Wheezing or Shortness of Air. 1 each 0    aspirin 81 MG EC tablet Take 1 tablet by mouth Daily.      benzonatate (TESSALON) 200 MG capsule Take 1 capsule by mouth 3 (Three) Times a Day As Needed for Cough. 21 capsule 0    bumetanide (BUMEX) 1 MG tablet bumetanide 1 mg tablet   Take 1 tablet twice a day for 3 days, then take 1 tablet daily      buPROPion XL (WELLBUTRIN XL) 300 MG 24 hr tablet       cetirizine (zyrTEC) 10 MG tablet As Needed.      FLUoxetine (PROzac) 20 MG capsule Prozac 20 mg capsule   Take 1 capsule every day by oral route.      fluticasone (FLONASE) 50 MCG/ACT nasal spray As Needed.      Insulin Glargine (BASAGLAR KWIKPEN SC) Inject 50 Units into the appropriate muscle as directed by prescriber Daily.      Jardiance 25 MG tablet tablet Take 1 tablet by mouth Every Morning.      lamoTRIgine (LaMICtal) 200 MG  tablet       losartan (COZAAR) 100 MG tablet TAKE 1 TABLET BY MOUTH EVERY DAY WITH HYDROCHLOROTHIAZIDE 25 MG TABLET      metFORMIN ER (GLUCOPHAGE-XR) 500 MG 24 hr tablet TAKE 2 TABLETS BY MOUTH IN THE MORNING AND 2 TABLETS AT NIGHT      Mounjaro 5 MG/0.5ML solution pen-injector INJECT 5 MG (0.5ml) UNDER THE SKIN INTO THE APPROPRIATE AREA AS DIRECTED ONE TIME PER WEEK      NOVOLOG FLEXPEN 100 UNIT/ML solution pen-injector sc pen       ondansetron ODT (ZOFRAN-ODT) 8 MG disintegrating tablet As Needed.      pravastatin (PRAVACHOL) 40 MG tablet Take 1 tablet by mouth Daily.      [DISCONTINUED] Semaglutide, 1 MG/DOSE, (Ozempic, 1 MG/DOSE,) 4 MG/3ML solution pen-injector Inject 1 mg every week by subcutaneous route for 84 days.       No current facility-administered medications on file prior to visit.      Allergies   Allergen Reactions    Atorvastatin Other (See Comments)     Muscle pain    Zestoretic [Lisinopril-Hydrochlorothiazide] Cough        Review of Systems   Constitutional:  Negative for activity change, appetite change, chills, diaphoresis, fatigue, fever and unexpected weight change.   HENT:  Negative for congestion, dental problem, drooling, ear discharge, ear pain, facial swelling, hearing loss, mouth sores, nosebleeds, postnasal drip, rhinorrhea, sinus pressure, sneezing, sore throat, tinnitus, trouble swallowing and voice change.    Eyes:  Negative for photophobia, pain, discharge, redness, itching and visual disturbance.   Respiratory:  Negative for apnea, cough, choking, chest tightness, shortness of breath, wheezing and stridor.    Cardiovascular:  Negative for chest pain, palpitations and leg swelling.   Gastrointestinal:  Negative for abdominal distention, abdominal pain, anal bleeding, blood in stool, constipation, diarrhea, nausea, rectal pain and vomiting.   Endocrine: Negative for cold intolerance, heat intolerance, polydipsia, polyphagia and polyuria.   Genitourinary:  Negative for decreased urine  "volume, difficulty urinating, dysuria, enuresis, flank pain, frequency, genital sores, hematuria and urgency.   Musculoskeletal:  Positive for arthralgias. Negative for back pain, gait problem, joint swelling, myalgias, neck pain and neck stiffness.   Skin:  Negative for color change, pallor, rash and wound.   Allergic/Immunologic: Negative for environmental allergies, food allergies and immunocompromised state.   Neurological:  Negative for dizziness, tremors, seizures, syncope, facial asymmetry, speech difficulty, weakness, light-headedness, numbness and headaches.   Hematological:  Negative for adenopathy. Does not bruise/bleed easily.   Psychiatric/Behavioral:  Negative for agitation, behavioral problems, confusion, decreased concentration, dysphoric mood, hallucinations, self-injury, sleep disturbance and suicidal ideas. The patient is not nervous/anxious and is not hyperactive.         Objective      Physical Exam  /80   Ht 177.8 cm (70\")   Wt (!) 144 kg (317 lb 3.2 oz)   BMI 45.51 kg/m²     Body mass index is 45.51 kg/m².    General:   Mental Status:  Alert   Appearance: Cooperative, in no acute distress   Build and Nutrition: Obese by BMI male   Orientation: Alert and oriented to person, place and time   Posture: Normal   Gait: Nonantalgic    Integument:              Right knee: no skin lesions, no rash, no ecchymosis              Left knee: no skin lesions, no rash, no ecchymosis     Lower Extremities:              Right Knee:                          Tenderness:     None                          Effusion:           None                          Swelling:          None                          Crepitus:          Positive                          Atrophy:           None                          Range of motion:        Extension:       0°                                                              Flexion:           120°  Instability:        No varus laxity, no valgus laxity, negative anterior " drawer  Deformities:     Varus              Left Knee:                          Tenderness:    None                          Effusion:          None                          Swelling:          None                          Crepitus:          Positive                          Atrophy:           None                          Range of motion:        Extension:       0°                                                              Flexion:           120°  Instability:        No varus laxity, no valgus laxity, negative anterior drawer  Deformities:     Varus    Imaging/Studies  Imaging Results (Last 24 Hours)       ** No results found for the last 24 hours. **          No new imaging today.    Assessment and Plan     Diagnoses and all orders for this visit:    1. Primary osteoarthritis of both knees (Primary)  -     - Large Joint Arthrocentesis: bilateral knee    2. Obesity, Class III, BMI 40-49.9 (morbid obesity)        1. Primary osteoarthritis of both knees    2. Obesity, Class III, BMI 40-49.9 (morbid obesity)        I reviewed my findings with the patient.  He would like repeat injections today, and these were provided.  I will see him back in 4 months, but sooner for any problems.  He continues to work on weight loss.    Procedure Note:  The potential benefits of performing a therapeutic bilateral knee joint injections, as well as potential risks (including, but not limited to infection, swelling, pain, bleeding, bruising, nerve/blood vessel damage, skin color changes, transient elevation in blood glucose levels, and fat atrophy) were discussed with the patient.  After informed consent, timeout procedure was performed, and the skin on the right and left knees was prepped with chlorhexidine soap and alcohol, after which ethyl chloride was applied to the skin at the injection site. Via the anterolateral approach, 1ml of Kenalog 40mg/ml mixed with 4ml 0.5% ropivacaine plain was injected into the knee joints.  The  patient tolerated the procedures well, experiencing 80% improvement in the right knee and 80% improvement in the left knee a few minutes following the injections. There were no complications.  Band-Aids were applied to the injection sites. Post-procedural instructions were given to the patient and/or their caregiver.      Return in about 4 months (around 7/4/2024).      Nazario Mares MD  03/04/24  09:03 EST    Dictated Utilizing Dragon Dictation

## 2024-03-04 NOTE — PROGRESS NOTES
Procedure   - Large Joint Arthrocentesis: bilateral knee on 3/4/2024 8:50 AM  Indications: pain  Details: 21 G needle, anterolateral approach  Medications (Right): 4 mL ropivacaine 0.5 %; 40 mg triamcinolone acetonide 40 MG/ML  Medications (Left): 4 mL ropivacaine 0.5 %; 40 mg triamcinolone acetonide 40 MG/ML  Outcome: tolerated well, no immediate complications  Procedure, treatment alternatives, risks and benefits explained, specific risks discussed. Consent was given by the patient. Immediately prior to procedure a time out was called to verify the correct patient, procedure, equipment, support staff and site/side marked as required. Patient was prepped and draped in the usual sterile fashion.

## 2024-07-08 ENCOUNTER — OFFICE VISIT (OUTPATIENT)
Dept: ORTHOPEDIC SURGERY | Facility: CLINIC | Age: 64
End: 2024-07-08
Payer: COMMERCIAL

## 2024-07-08 VITALS
WEIGHT: 306.6 LBS | BODY MASS INDEX: 43.89 KG/M2 | DIASTOLIC BLOOD PRESSURE: 78 MMHG | HEIGHT: 70 IN | SYSTOLIC BLOOD PRESSURE: 160 MMHG

## 2024-07-08 DIAGNOSIS — M17.0 PRIMARY OSTEOARTHRITIS OF BOTH KNEES: Primary | ICD-10-CM

## 2024-07-08 DIAGNOSIS — E66.01 CLASS 3 SEVERE OBESITY WITHOUT SERIOUS COMORBIDITY WITH BODY MASS INDEX (BMI) OF 40.0 TO 44.9 IN ADULT, UNSPECIFIED OBESITY TYPE: ICD-10-CM

## 2024-07-08 PROCEDURE — 20610 DRAIN/INJ JOINT/BURSA W/O US: CPT | Performed by: ORTHOPAEDIC SURGERY

## 2024-07-08 RX ORDER — ROPIVACAINE HYDROCHLORIDE 5 MG/ML
4 INJECTION, SOLUTION EPIDURAL; INFILTRATION; PERINEURAL
Status: COMPLETED | OUTPATIENT
Start: 2024-07-08 | End: 2024-07-08

## 2024-07-08 RX ORDER — LAMOTRIGINE 150 MG/1
TABLET ORAL
COMMUNITY
Start: 2024-06-27

## 2024-07-08 RX ORDER — INSULIN DEGLUDEC 200 U/ML
INJECTION, SOLUTION SUBCUTANEOUS
COMMUNITY
Start: 2024-05-18

## 2024-07-08 RX ORDER — AMMONIUM LACTATE 12 G/100G
LOTION TOPICAL
COMMUNITY
Start: 2024-07-02

## 2024-07-08 RX ORDER — SEMAGLUTIDE 2.68 MG/ML
INJECTION, SOLUTION SUBCUTANEOUS
COMMUNITY
Start: 2024-03-15

## 2024-07-08 RX ORDER — TRIAMCINOLONE ACETONIDE 40 MG/ML
40 INJECTION, SUSPENSION INTRA-ARTICULAR; INTRAMUSCULAR
Status: COMPLETED | OUTPATIENT
Start: 2024-07-08 | End: 2024-07-08

## 2024-07-08 RX ORDER — PEN NEEDLE, DIABETIC 31 GX5/16"
NEEDLE, DISPOSABLE MISCELLANEOUS
COMMUNITY
Start: 2024-04-26

## 2024-07-08 RX ADMIN — ROPIVACAINE HYDROCHLORIDE 4 ML: 5 INJECTION, SOLUTION EPIDURAL; INFILTRATION; PERINEURAL at 09:20

## 2024-07-08 RX ADMIN — TRIAMCINOLONE ACETONIDE 40 MG: 40 INJECTION, SUSPENSION INTRA-ARTICULAR; INTRAMUSCULAR at 09:20

## 2024-07-08 NOTE — PROGRESS NOTES
Procedure   - Large Joint Arthrocentesis: bilateral knee on 7/8/2024 9:20 AM  Indications: pain  Details: 21 G needle, anterolateral approach  Medications (Right): 40 mg triamcinolone acetonide 40 MG/ML; 4 mL ropivacaine 0.5 %  Medications (Left): 40 mg triamcinolone acetonide 40 MG/ML; 4 mL ropivacaine 0.5 %  Outcome: tolerated well, no immediate complications  Procedure, treatment alternatives, risks and benefits explained, specific risks discussed. Consent was given by the patient. Immediately prior to procedure a time out was called to verify the correct patient, procedure, equipment, support staff and site/side marked as required. Patient was prepped and draped in the usual sterile fashion.

## 2024-07-08 NOTE — PROGRESS NOTES
[facility] [place] [facility] Note    Subjective     Chief Complaint   Patient presents with    Follow-up     4 month follow up - Primary osteoarthritis of both knees        HPI    It has been 4  month(s) since Mr. Chowdary's last visit. He returns to clinic today for follow-up of bilateral knee arthritis. The issue has been ongoing for 3 year(s). He rates his pain a 4/10 on the pain scale. Previous/current treatments: steroid injection (last injection 3/4/24). Current symptoms: pain. The pain is worse with sitting, climbing stairs, and rising from seated position; resting improve the pain. Overall, he is doing the same.  He would like repeat injections for his knees today.  Previous injections have provided relief.  He continues to work on weight loss, and has made some modifications that have helped.    I have reviewed the following portions of the patient's history and agree with: History of Present Illness and Review of Systems    Patient Active Problem List   Diagnosis    Severe obstructive sleep apnea    Morbid obesity    Hypersomnia    Type 2 diabetes mellitus    Essential hypertension    Dyslipidemia    Snoring    Exertional dyspnea    Dry cough    Edema of both lower extremities    Chest pain    Bipolar 1 disorder    Anxiety    Unstable angina     Past Medical History:   Diagnosis Date    Acromioclavicular separation 04/2018    Same as above problem    Bipolar 1 disorder     Diabetes mellitus     Dislocation, shoulder 04/2018    Same    Diverticulitis     Hyperlipidemia     Hypertension     Mood disorder     Respiratory infection     Rotator cuff syndrome 04/2018    R Shoulder      Past Surgical History:   Procedure Laterality Date    CARDIAC CATHETERIZATION  2012    CARDIAC CATHETERIZATION N/A 07/13/2021    Procedure: Left Heart Cath;  Surgeon: Lois Levy MD;  Location: [facility] CATH INVASIVE LOCATION;  Service: Cardiology;  Laterality: N/A;    CHOLECYSTECTOMY      COLONOSCOPY       GALLBLADDER SURGERY      SHOULDER SURGERY Right     REPAIR     TRIGGER POINT INJECTION      First and subsequent visits      Family History   Problem Relation Age of Onset    Cancer Mother     Hypertension Mother     Deep vein thrombosis Mother     Diabetes Father     Heart disease Father     Hypertension Father     COPD Father     Heart failure Father      Social History     Socioeconomic History    Marital status:    Tobacco Use    Smoking status: Former     Current packs/day: 0.00     Average packs/day: 1 pack/day for 10.0 years (10.0 ttl pk-yrs)     Types: Cigarettes     Start date: 1978     Quit date: 1986     Years since quittin.5    Smokeless tobacco: Never   Vaping Use    Vaping status: Never Used   Substance and Sexual Activity    Alcohol use: Not Currently     Comment: Occasional drink    Drug use: Never    Sexual activity: Not Currently     Partners: Female     Birth control/protection: None      Current Outpatient Medications on File Prior to Visit   Medication Sig Dispense Refill    albuterol (PROAIR RESPICLICK) 108 (90 Base) MCG/ACT inhaler Inhale 2 puffs Every 4 (Four) Hours As Needed for Wheezing or Shortness of Air. 1 each 0    ammonium lactate (LAC-HYDRIN) 12 % lotion       aspirin 81 MG EC tablet Take 1 tablet by mouth Daily.      B-D ULTRAFINE III SHORT PEN 31G X 8 MM misc USE AS DIRECTED TO INJECT MEDICATION 4 TIMES DAILY      benzonatate (TESSALON) 200 MG capsule Take 1 capsule by mouth 3 (Three) Times a Day As Needed for Cough. 21 capsule 0    bumetanide (BUMEX) 1 MG tablet bumetanide 1 mg tablet   Take 1 tablet twice a day for 3 days, then take 1 tablet daily      buPROPion XL (WELLBUTRIN XL) 300 MG 24 hr tablet       cetirizine (zyrTEC) 10 MG tablet As Needed.      FLUoxetine (PROzac) 20 MG capsule Prozac 20 mg capsule   Take 1 capsule every day by oral route.      fluticasone (FLONASE) 50 MCG/ACT nasal spray As Needed.      Insulin Glargine (BASAGLAR KWIKPEN SC) Inject  50 Units into the appropriate muscle as directed by prescriber Daily.      Jardiance 25 MG tablet tablet Take 1 tablet by mouth Every Morning.      lamoTRIgine (LaMICtal) 150 MG tablet TAKE 2 TABLETS BY MOUTH EVERY DAY AS DIRECTED      losartan (COZAAR) 100 MG tablet TAKE 1 TABLET BY MOUTH EVERY DAY WITH HYDROCHLOROTHIAZIDE 25 MG TABLET      metFORMIN ER (GLUCOPHAGE-XR) 500 MG 24 hr tablet TAKE 2 TABLETS BY MOUTH IN THE MORNING AND 2 TABLETS AT NIGHT      Mounjaro 5 MG/0.5ML solution pen-injector INJECT 5 MG (0.5ml) UNDER THE SKIN INTO THE APPROPRIATE AREA AS DIRECTED ONE TIME PER WEEK      Mounjaro 7.5 MG/0.5ML solution pen-injector pen Inject 0.5 mL under the skin into the appropriate area as directed Every 7 (Seven) Days. 2 mL 2    NOVOLOG FLEXPEN 100 UNIT/ML solution pen-injector sc pen       ondansetron ODT (ZOFRAN-ODT) 8 MG disintegrating tablet As Needed.      Ozempic, 2 MG/DOSE, 8 MG/3ML solution pen-injector INJECT 2 MG (0.5ml) UNDER THE SKIN INTO THE APPROPRIATE AREA AS DIRECTED ONE TIME PER WEEK      pravastatin (PRAVACHOL) 40 MG tablet Take 1 tablet by mouth Daily.      Tirzepatide (Mounjaro) 7.5 MG/0.5ML solution pen-injector pen Inject 0.5 mL under the skin into the appropriate area as directed Every 7 (Seven) Days. 2 mL 2    Tresiba FlexTouch 200 UNIT/ML solution pen-injector pen injection INJECT 50 UNITS UNDER THE SKIN ON ONE SIDE OF ABDOMEN AND 50 UNITS ON OTHER SIDE OF ABDOMEN EVERY NIGHT, PLUS 2 UNITS PER INJECTION TO PRIME      [DISCONTINUED] lamoTRIgine (LaMICtal) 200 MG tablet        No current facility-administered medications on file prior to visit.      Allergies   Allergen Reactions    Atorvastatin Other (See Comments)     Muscle pain    Zestoretic [Lisinopril-Hydrochlorothiazide] Cough        Review of Systems   Constitutional:  Negative for activity change, appetite change, chills, diaphoresis, fatigue, fever and unexpected weight change.   HENT:  Negative for congestion, dental problem,  "drooling, ear discharge, ear pain, facial swelling, hearing loss, mouth sores, nosebleeds, postnasal drip, rhinorrhea, sinus pressure, sneezing, sore throat, tinnitus, trouble swallowing and voice change.    Eyes:  Negative for photophobia, pain, discharge, redness, itching and visual disturbance.   Respiratory:  Negative for apnea, cough, choking, chest tightness, shortness of breath, wheezing and stridor.    Cardiovascular:  Negative for chest pain, palpitations and leg swelling.   Gastrointestinal:  Negative for abdominal distention, abdominal pain, anal bleeding, blood in stool, constipation, diarrhea, nausea, rectal pain and vomiting.   Endocrine: Negative for cold intolerance, heat intolerance, polydipsia, polyphagia and polyuria.   Genitourinary:  Negative for decreased urine volume, difficulty urinating, dysuria, enuresis, flank pain, frequency, genital sores, hematuria and urgency.   Musculoskeletal:  Positive for arthralgias. Negative for back pain, gait problem, joint swelling, myalgias, neck pain and neck stiffness.   Skin:  Negative for color change, pallor, rash and wound.   Allergic/Immunologic: Negative for environmental allergies, food allergies and immunocompromised state.   Neurological:  Negative for dizziness, tremors, seizures, syncope, facial asymmetry, speech difficulty, weakness, light-headedness, numbness and headaches.   Hematological:  Negative for adenopathy. Does not bruise/bleed easily.   Psychiatric/Behavioral:  Negative for agitation, behavioral problems, confusion, decreased concentration, dysphoric mood, hallucinations, self-injury, sleep disturbance and suicidal ideas. The patient is not nervous/anxious and is not hyperactive.         Objective      Physical Exam  /78   Ht 177.8 cm (70\")   Wt (!) 139 kg (306 lb 9.6 oz)   BMI 43.99 kg/m²     Body mass index is 43.99 kg/m².  Class 3 Severe Obesity (BMI >=40). Obesity-related health conditions include the following: " obstructive sleep apnea, hypertension, osteoarthritis, and lower extremity venous stasis disease. Obesity is improving with lifestyle modifications. BMI is is above average; BMI management plan is completed. We discussed portion control and increasing exercise.      General:   Mental Status:  Alert   Appearance: Cooperative, in no acute distress   Build and Nutrition: Obese by BMI male   Orientation: Alert and oriented to person, place and time   Posture: Normal   Gait: Nonantalgic    Integument:              Right knee: no skin lesions, no rash, no ecchymosis              Left knee: no skin lesions, no rash, no ecchymosis     Lower Extremities:              Right Knee:                          Tenderness:     None                          Effusion:           None                          Swelling:          None                          Crepitus:          Positive                          Atrophy:           None                          Range of motion:        Extension:       0°                                                              Flexion:           120°  Instability:        No varus laxity, no valgus laxity, negative anterior drawer  Deformities:     Varus              Left Knee:                          Tenderness:    None                          Effusion:          None                          Swelling:          None                          Crepitus:          Positive                          Atrophy:           None                          Range of motion:        Extension:       0°                                                              Flexion:           120°  Instability:        No varus laxity, no valgus laxity, negative anterior drawer  Deformities:     Varus    Imaging/Studies  Imaging Results (Last 24 Hours)       ** No results found for the last 24 hours. **          No new imaging today.    Assessment and Plan     Diagnoses and all orders for this visit:    1. Primary osteoarthritis of  both knees (Primary)  -     - Large Joint Arthrocentesis: bilateral knee    2. Class 3 severe obesity without serious comorbidity with body mass index (BMI) of 40.0 to 44.9 in adult, unspecified obesity type        1. Primary osteoarthritis of both knees    2. Class 3 severe obesity without serious comorbidity with body mass index (BMI) of 40.0 to 44.9 in adult, unspecified obesity type        I reviewed my findings with the patient.  He would like repeat injections for both of his knees today, and these were provided.  I will see him back in 4 months, but sooner for any problems.  He will continue to work on weight loss.    Procedure Note:  The potential benefits of performing a therapeutic bilateral knee joint injections, as well as potential risks (including, but not limited to infection, swelling, pain, bleeding, bruising, nerve/blood vessel damage, skin color changes, transient elevation in blood glucose levels, and fat atrophy) were discussed with the patient.  After informed consent, timeout procedure was performed, and the skin on the right and left knees was prepped with chlorhexidine soap and alcohol, after which ethyl chloride was applied to the skin at the injection site. Via the anterolateral approach, 1ml of Kenalog 40mg/ml mixed with 4ml 0.5% ropivacaine plain was injected into the knee joints.  The patient tolerated the procedures well, experiencing 80% improvement in the right knee and 80% improvement in the left knee a few minutes following the injections. There were no complications.  Band-Aids were applied to the injection sites. Post-procedural instructions were given to the patient and/or their caregiver.      Return in about 4 months (around 11/8/2024).      Nazario Mares MD  07/08/24  09:35 EDT    Dictated Utilizing Dragon Dictation

## 2024-09-09 ENCOUNTER — OFFICE VISIT (OUTPATIENT)
Dept: SLEEP MEDICINE | Facility: CLINIC | Age: 64
End: 2024-09-09
Payer: COMMERCIAL

## 2024-09-09 VITALS
HEART RATE: 76 BPM | HEIGHT: 70 IN | OXYGEN SATURATION: 95 % | SYSTOLIC BLOOD PRESSURE: 142 MMHG | TEMPERATURE: 98.2 F | DIASTOLIC BLOOD PRESSURE: 60 MMHG | BODY MASS INDEX: 45.1 KG/M2 | WEIGHT: 315 LBS

## 2024-09-09 DIAGNOSIS — G47.33 OSA (OBSTRUCTIVE SLEEP APNEA): Primary | ICD-10-CM

## 2024-09-09 PROCEDURE — 99213 OFFICE O/P EST LOW 20 MIN: CPT | Performed by: NURSE PRACTITIONER

## 2024-09-09 NOTE — PROGRESS NOTES
Chief Complaint:   Chief Complaint   Patient presents with    Follow-up    Sleep Apnea       HPI:    Joselito Chowdary III is a 64 y.o. male here for follow-up of sleep apnea.  Patient was last seen 9/8/2023.  Patient continues to do well with CPAP therapy.  Patient is sleeping 6 hours nightly and feels rested upon awakening.  Patient goes to sleep quickly and does get up x 2.  Patient does well with fullface mask and standard tubing.  Patient has [place] score of 13/24.  Patient has no concerns or complaints and will continue therapy.        Current medications are:   Current Outpatient Medications:     albuterol (PROAIR RESPICLICK) 108 (90 Base) MCG/ACT inhaler, Inhale 2 puffs Every 4 (Four) Hours As Needed for Wheezing or Shortness of Air., Disp: 1 each, Rfl: 0    ammonium lactate (LAC-HYDRIN) 12 % lotion, , Disp: , Rfl:     aspirin 81 MG EC tablet, Take 1 tablet by mouth Daily., Disp: , Rfl:     B-D ULTRAFINE III SHORT PEN 31G X 8 MM misc, USE AS DIRECTED TO INJECT MEDICATION 4 TIMES DAILY, Disp: , Rfl:     benzonatate (TESSALON) 200 MG capsule, Take 1 capsule by mouth 3 (Three) Times a Day As Needed for Cough., Disp: 21 capsule, Rfl: 0    bumetanide (BUMEX) 1 MG tablet, bumetanide 1 mg tablet  Take 1 tablet twice a day for 3 days, then take 1 tablet daily, Disp: , Rfl:     buPROPion XL (WELLBUTRIN XL) 300 MG 24 hr tablet, , Disp: , Rfl:     cetirizine (zyrTEC) 10 MG tablet, As Needed., Disp: , Rfl:     FLUoxetine (PROzac) 20 MG capsule, Prozac 20 mg capsule  Take 1 capsule every day by oral route., Disp: , Rfl:     fluticasone (FLONASE) 50 MCG/ACT nasal spray, As Needed., Disp: , Rfl:     Insulin Glargine (BASAGLAR KWIKPEN SC), Inject 50 Units into the appropriate muscle as directed by prescriber Daily., Disp: , Rfl:     Jardiance 25 MG tablet tablet, Take 1 tablet by mouth Every Morning., Disp: , Rfl:     lamoTRIgine (LaMICtal) 150 MG tablet, TAKE 2 TABLETS BY MOUTH EVERY DAY AS DIRECTED, Disp: , Rfl:      losartan (COZAAR) 100 MG tablet, TAKE 1 TABLET BY MOUTH EVERY DAY WITH HYDROCHLOROTHIAZIDE 25 MG TABLET, Disp: , Rfl:     metFORMIN ER (GLUCOPHAGE-XR) 500 MG 24 hr tablet, TAKE 2 TABLETS BY MOUTH IN THE MORNING AND 2 TABLETS AT NIGHT, Disp: , Rfl:     Mounjaro 5 MG/0.5ML solution pen-injector, INJECT 5 MG (0.5ml) UNDER THE SKIN INTO THE APPROPRIATE AREA AS DIRECTED ONE TIME PER WEEK, Disp: , Rfl:     Mounjaro 7.5 MG/0.5ML solution pen-injector pen, Inject 0.5 mL under the skin into the appropriate area as directed Every 7 (Seven) Days., Disp: 2 mL, Rfl: 2    NOVOLOG FLEXPEN 100 UNIT/ML solution pen-injector sc pen, , Disp: , Rfl:     ondansetron ODT (ZOFRAN-ODT) 8 MG disintegrating tablet, As Needed., Disp: , Rfl:     Ozempic, 2 MG/DOSE, 8 MG/3ML solution pen-injector, INJECT 2 MG (0.5ml) UNDER THE SKIN INTO THE APPROPRIATE AREA AS DIRECTED ONE TIME PER WEEK, Disp: , Rfl:     pravastatin (PRAVACHOL) 40 MG tablet, Take 1 tablet by mouth Daily., Disp: , Rfl:     Tirzepatide (Mounjaro) 7.5 MG/0.5ML solution pen-injector pen, Inject 0.5 mL under the skin into the appropriate area as directed Every 7 (Seven) Days., Disp: 2 mL, Rfl: 2    Tresiba FlexTouch 200 UNIT/ML solution pen-injector pen injection, INJECT 50 UNITS UNDER THE SKIN ON ONE SIDE OF ABDOMEN AND 50 UNITS ON OTHER SIDE OF ABDOMEN EVERY NIGHT, PLUS 2 UNITS PER INJECTION TO PRIME, Disp: , Rfl: .      The patient's relevant past medical, surgical, family and social history were reviewed and updated in Epic as appropriate.       Review of Systems   HENT:  Positive for hearing loss.    Eyes:  Positive for visual disturbance.   Respiratory:  Positive for apnea and shortness of breath.    Cardiovascular:  Positive for leg swelling.   Genitourinary:  Positive for frequency.   Musculoskeletal:  Positive for arthralgias.   Allergic/Immunologic: Positive for environmental allergies.   Psychiatric/Behavioral:  Positive for dysphoric mood and sleep disturbance. The  patient is nervous/anxious.    All other systems reviewed and are negative.        Objective:    Physical Exam  Constitutional:       Appearance: Normal appearance.   HENT:      Head: Normocephalic and atraumatic.      Mouth/Throat:      Pharynx: Oropharynx is clear.      Comments: Mallampati 3 anatomy  Cardiovascular:      Rate and Rhythm: Normal rate and regular rhythm.   Pulmonary:      Effort: Pulmonary effort is normal.      Breath sounds: Normal breath sounds.   Skin:     General: Skin is warm and dry.   Neurological:      Mental Status: He is alert and oriented to person, place, and time.   Psychiatric:         Mood and Affect: Mood normal.         Behavior: Behavior normal.         Thought Content: Thought content normal.         Judgment: Judgment normal.         CPAP Report    87/90 days of use  Greater than 4-hour use 77%  Setting 14-20  95th percentile pressure 16.3  AHI of 6.6  The patient continues to use and benefit from CPAP therapy.    ASSESSMENT/PLAN    Diagnoses and all orders for this visit:    1. GT (obstructive sleep apnea) (Primary)  -     PAP Therapy        Counseled patient regarding multimodal approach with healthy nutrition, healthy sleep, regular physical activity, social activities, counseling, and medications. Encouraged to practice lateral sleep position. Avoid alcohol and sedatives close to bedtime.    Refill supplies x 1 year.  Return to clinic 1 year or sooner as symptoms warrant.      Signed by  Marissa Asher, MELVIN    September 9, 2024      CC: Meme Mccollum MD         No ref. provider found

## 2024-11-13 ENCOUNTER — OFFICE VISIT (OUTPATIENT)
Dept: ORTHOPEDIC SURGERY | Facility: CLINIC | Age: 64
End: 2024-11-13
Payer: COMMERCIAL

## 2024-11-13 VITALS
WEIGHT: 310.2 LBS | HEIGHT: 70 IN | BODY MASS INDEX: 44.41 KG/M2 | DIASTOLIC BLOOD PRESSURE: 66 MMHG | SYSTOLIC BLOOD PRESSURE: 134 MMHG

## 2024-11-13 DIAGNOSIS — M17.0 PRIMARY OSTEOARTHRITIS OF BOTH KNEES: Primary | ICD-10-CM

## 2024-11-13 PROCEDURE — 99212 OFFICE O/P EST SF 10 MIN: CPT | Performed by: ORTHOPAEDIC SURGERY

## 2024-11-13 NOTE — PROGRESS NOTES
[facility] Note    Subjective     Chief Complaint   Patient presents with    Follow-up     4 month follow up -- Primary osteoarthritis of both knees        HPI    It has been 4  month(s) since Mr. Chowdary's last visit. He returns to clinic today for follow-up of bilateral knee pain. The issue has been ongoing for 3 year(s). He rates his pain a 2/10 on the pain scale. Previous/current treatments: NSAIDS. Current symptoms: pain. The pain is worse with climbing stairs; elevating the extremity improve the pain. Overall, he is doing better.  Last injections helped out significantly.      I have reviewed the following portions of the patient's history and agree with: History of Present Illness and Review of Systems    Patient Active Problem List   Diagnosis    Severe obstructive sleep apnea    Morbid obesity    Hypersomnia    Type 2 diabetes mellitus    Essential hypertension    Dyslipidemia    Snoring    Exertional dyspnea    Dry cough    Edema of both lower extremities    Chest pain    Bipolar 1 disorder    Anxiety    Unstable angina     Past Medical History:   Diagnosis Date    Acromioclavicular separation 04/2018    Same as above problem    Bipolar 1 disorder     Diabetes mellitus     Dislocation, shoulder 04/2018    Same    Diverticulitis     Hyperlipidemia     Hypertension     Knee swelling     On first visit    Mood disorder     Respiratory infection     Rotator cuff syndrome 04/2018    R Shoulder      Past Surgical History:   Procedure Laterality Date    CARDIAC CATHETERIZATION  2012    CARDIAC CATHETERIZATION N/A 07/13/2021    Procedure: Left Heart Cath;  Surgeon: Lois Levy MD;  Location: [facility] CATH INVASIVE LOCATION;  Service: Cardiology;  Laterality: N/A;    CHOLECYSTECTOMY      COLONOSCOPY      GALLBLADDER SURGERY      SHOULDER SURGERY Right     REPAIR     TRIGGER POINT INJECTION      First and subsequent visits      Family History   Problem Relation Age of Onset    Cancer  Mother     Hypertension Mother     Deep vein thrombosis Mother     Diabetes Father     Heart disease Father     Hypertension Father     COPD Father     Heart failure Father      Social History     Socioeconomic History    Marital status:    Tobacco Use    Smoking status: Former     Current packs/day: 0.00     Average packs/day: 1 pack/day for 10.0 years (10.0 ttl pk-yrs)     Types: Cigarettes     Start date: 1978     Quit date: 1986     Years since quittin.8    Smokeless tobacco: Never   Vaping Use    Vaping status: Never Used   Substance and Sexual Activity    Alcohol use: Not Currently     Comment: Occasional drink    Drug use: Never    Sexual activity: Not Currently     Partners: Female     Birth control/protection: None      Current Outpatient Medications on File Prior to Visit   Medication Sig Dispense Refill    albuterol (PROAIR RESPICLICK) 108 (90 Base) MCG/ACT inhaler Inhale 2 puffs Every 4 (Four) Hours As Needed for Wheezing or Shortness of Air. 1 each 0    ammonium lactate (LAC-HYDRIN) 12 % lotion       ammonium lactate (LAC-HYDRIN) 12 % lotion Apply 1 Application topically to the appropriate area as directed 2 (Two) Times a Day. 400 g 1    aspirin 81 MG EC tablet Take 1 tablet by mouth Daily.      B-D ULTRAFINE III SHORT PEN 31G X 8 MM misc USE AS DIRECTED TO INJECT MEDICATION 4 TIMES DAILY      benzonatate (TESSALON) 200 MG capsule Take 1 capsule by mouth 3 (Three) Times a Day As Needed for Cough. 21 capsule 0    bumetanide (BUMEX) 1 MG tablet bumetanide 1 mg tablet   Take 1 tablet twice a day for 3 days, then take 1 tablet daily      buPROPion XL (WELLBUTRIN XL) 300 MG 24 hr tablet       buPROPion XL (Wellbutrin XL) 300 MG 24 hr tablet Take 1 tablet by mouth Daily. 30 tablet 5    cetirizine (zyrTEC) 10 MG tablet As Needed.      empagliflozin (Jardiance) 25 MG tablet tablet Take 1 tablet by mouth Every Morning. 90 tablet 0    empagliflozin (Jardiance) 25 MG tablet tablet Take 1 tablet  by mouth Every Morning. 90 tablet 0    fluconazole (DIFLUCAN) 150 MG tablet Take 1 tablet by mouth Daily. 3 tablet 0    FLUoxetine (PROzac) 20 MG capsule Prozac 20 mg capsule   Take 1 capsule every day by oral route.      FLUoxetine (PROzac) 20 MG capsule Take 1 capsule by mouth Daily. 30 capsule 5    fluticasone (FLONASE) 50 MCG/ACT nasal spray As Needed.      insulin aspart (NovoLOG FlexPen) 100 UNIT/ML solution pen-injector sc pen Inject up to 42 units under the skin into the appropriate area in the morning before breakfast, 20 units before lunch/snack, & up to 65 units before dinner. (Plus 2 units to prime needle 3 times daily) 120 mL 0    Insulin Degludec (Tresiba FlexTouch) 200 UNIT/ML solution pen-injector pen injection Inject 50 units on one side of abdomen and 50 units on other side of abdomen at night (Total of 100 units) plus 2 units per injection to prime needle (max daily dose of 104 units) 18 mL 4    Insulin Glargine (BASAGLAR KWIKPEN SC) Inject 50 Units into the appropriate muscle as directed by prescriber Daily.      Insulin Pen Needle (B-D ULTRAFINE III SHORT PEN) 31G X 8 MM misc Use as directed 4 times a day. 400 each 1    Insulin Pen Needle 31G X 8 MM misc Use as directed 4 (Four) Times a Day. 400 each 1    Jardiance 25 MG tablet tablet Take 1 tablet by mouth Every Morning.      lamoTRIgine (LaMICtal) 150 MG tablet TAKE 2 TABLETS BY MOUTH EVERY DAY AS DIRECTED      lamoTRIgine (LaMICtal) 150 MG tablet Take 2 tablets by mouth Daily. 60 tablet 5    losartan (COZAAR) 100 MG tablet TAKE 1 TABLET BY MOUTH EVERY DAY WITH HYDROCHLOROTHIAZIDE 25 MG TABLET      metFORMIN ER (GLUCOPHAGE-XR) 500 MG 24 hr tablet TAKE 2 TABLETS BY MOUTH IN THE MORNING AND 2 TABLETS AT NIGHT      metFORMIN ER (GLUCOPHAGE-XR) 500 MG 24 hr tablet Take 2 tablets by mouth Every Morning AND 2 tablets Every Night. 360 tablet 0    Mounjaro 7.5 MG/0.5ML solution pen-injector pen Inject 0.5 mL under the skin into the appropriate area as  directed Every 7 (Seven) Days. 2 mL 2    Mounjaro 7.5 MG/0.5ML solution pen-injector pen Inject 0.5 mL under the skin into the appropriate area as directed 1 (One) Time Per Week. 2 mL 3    NOVOLOG FLEXPEN 100 UNIT/ML solution pen-injector sc pen       ondansetron ODT (ZOFRAN-ODT) 8 MG disintegrating tablet As Needed.      Ozempic, 2 MG/DOSE, 8 MG/3ML solution pen-injector INJECT 2 MG (0.5ml) UNDER THE SKIN INTO THE APPROPRIATE AREA AS DIRECTED ONE TIME PER WEEK      pravastatin (PRAVACHOL) 40 MG tablet Take 1 tablet by mouth Daily.      pravastatin (PRAVACHOL) 40 MG tablet Take 1 tablet by mouth Daily. 90 tablet 1    Tirzepatide (Mounjaro) 7.5 MG/0.5ML solution pen-injector pen Inject 0.5 mL under the skin into the appropriate area as directed Every 7 (Seven) Days. 2 mL 2    Tresiba FlexTouch 200 UNIT/ML solution pen-injector pen injection INJECT 50 UNITS UNDER THE SKIN ON ONE SIDE OF ABDOMEN AND 50 UNITS ON OTHER SIDE OF ABDOMEN EVERY NIGHT, PLUS 2 UNITS PER INJECTION TO PRIME      Tresiba FlexTouch 200 UNIT/ML solution pen-injector pen injection Inject 50 units on one side of abdomen & 50 units on other side of abdomen at night (total 100 units) plus 2 units per injection to prime needle (max daily dose: 104 units) 18 mL 4    [DISCONTINUED] Mounjaro 5 MG/0.5ML solution pen-injector INJECT 5 MG (0.5ml) UNDER THE SKIN INTO THE APPROPRIATE AREA AS DIRECTED ONE TIME PER WEEK      [DISCONTINUED] nystatin (MYCOSTATIN) 289996 UNIT/GM cream Apply 1 Application topically to the appropriate area as directed 2 (Two) Times a Day. 30 g 1    [DISCONTINUED] Semaglutide, 2 MG/DOSE, (Ozempic, 2 MG/DOSE,) 8 MG/3ML solution pen-injector Inject 2 mg under the skin into the appropriate area as directed 1 (One) Time Per Week. 3 mL 0     No current facility-administered medications on file prior to visit.      Allergies   Allergen Reactions    Atorvastatin Other (See Comments)     Muscle pain    Zestoretic  [Lisinopril-Hydrochlorothiazide] Cough        Review of Systems   Constitutional:  Negative for activity change, appetite change, chills, diaphoresis, fatigue, fever and unexpected weight change.   HENT:  Negative for congestion, dental problem, drooling, ear discharge, ear pain, facial swelling, hearing loss, mouth sores, nosebleeds, postnasal drip, rhinorrhea, sinus pressure, sneezing, sore throat, tinnitus, trouble swallowing and voice change.    Eyes:  Negative for photophobia, pain, discharge, redness, itching and visual disturbance.   Respiratory:  Negative for apnea, cough, choking, chest tightness, shortness of breath, wheezing and stridor.    Cardiovascular:  Negative for chest pain, palpitations and leg swelling.   Gastrointestinal:  Negative for abdominal distention, abdominal pain, anal bleeding, blood in stool, constipation, diarrhea, nausea, rectal pain and vomiting.   Endocrine: Negative for cold intolerance, heat intolerance, polydipsia, polyphagia and polyuria.   Genitourinary:  Negative for decreased urine volume, difficulty urinating, dysuria, enuresis, flank pain, frequency, genital sores, hematuria and urgency.   Musculoskeletal:  Positive for arthralgias. Negative for back pain, gait problem, joint swelling, myalgias, neck pain and neck stiffness.   Skin:  Negative for color change, pallor, rash and wound.   Allergic/Immunologic: Negative for environmental allergies, food allergies and immunocompromised state.   Neurological:  Negative for dizziness, tremors, seizures, syncope, facial asymmetry, speech difficulty, weakness, light-headedness, numbness and headaches.   Hematological:  Negative for adenopathy. Does not bruise/bleed easily.   Psychiatric/Behavioral:  Negative for agitation, behavioral problems, confusion, decreased concentration, dysphoric mood, hallucinations, self-injury, sleep disturbance and suicidal ideas. The patient is not nervous/anxious and is not hyperactive.      "    Objective      Physical Exam  /66   Ht 177.8 cm (70\")   Wt (!) 141 kg (310 lb 3.2 oz)   BMI 44.51 kg/m²     Body mass index is 44.51 kg/m².         General:   Mental Status:  Alert   Appearance: Cooperative, in no acute distress   Build and Nutrition: Obese by BMI male   Orientation: Alert and oriented to person, place and time   Posture: Normal   Gait: Nonantalgic    Integument:              Right knee: no skin lesions, no rash, no ecchymosis              Left knee: no skin lesions, no rash, no ecchymosis     Lower Extremities:              Right Knee:                          Tenderness:     None                          Effusion:           None                          Swelling:          None                          Crepitus:          Positive                          Atrophy:           None                          Range of motion:        Extension:       0°                                                              Flexion:           120°  Instability:        No varus laxity, no valgus laxity, negative anterior drawer  Deformities:     Varus              Left Knee:                          Tenderness:    None                          Effusion:          None                          Swelling:          None                          Crepitus:          Positive                          Atrophy:           None                          Range of motion:        Extension:       0°                                                              Flexion:           120°  Instability:        No varus laxity, no valgus laxity, negative anterior drawer  Deformities:     Varus    Imaging/Studies  Imaging Results (Last 24 Hours)       Procedure Component Value Units Date/Time    XR Knee 4+ View Bilateral [[id number]] Resulted: 11/13/24 1003     Updated: 11/13/24 1003    Narrative:      Right Knee Radiographs  Indication: right knee pain  Views: Standing AP's and skiers of both knees, with lateral and sunrise "   views of the right knee    Comparison: 6/28/2023    Findings:    Bone-on-bone contact medial compartment, tricompartmental osteophytes,   varus alignment, no acute bony abnormalities.  No unusual bony features.    Mild worsening compared to the previous imaging.    Left Knee Radiographs  Indication: left knee pain  Views: Standing AP's and skiers of both knees, with lateral and sunrise   views of the left knee    Comparison: 6/28/2023    Findings:    Bone-on-bone contact medial compartment, tricompartmental osteophytes,   varus alignment, no acute bony abnormalities.  No unusual bony features.    Mild worsening compared to the previous imaging.                Assessment and Plan     Diagnoses and all orders for this visit:    1. Primary osteoarthritis of both knees (Primary)  -     XR Knee 4+ View Bilateral        1. Primary osteoarthritis of both knees          I reviewed my findings with the patient.  Both of his knees are doing well today, and the last set of injections helped out and he is still having good relief currently.  We decided to follow-up in 6 months, but I will be happy to see him back sooner for any problems.    Return in about 6 months (around 5/13/2025).      Nazario Mares MD  11/13/24  10:07 EST    Dictated Utilizing Dragon Dictation

## 2025-05-08 ENCOUNTER — HOSPITAL ENCOUNTER (EMERGENCY)
Facility: HOSPITAL | Age: 65
Discharge: HOME | End: 2025-05-08
Attending: FAMILY MEDICINE
Payer: COMMERCIAL

## 2025-05-08 ENCOUNTER — APPOINTMENT (OUTPATIENT)
Facility: HOSPITAL | Age: 65
End: 2025-05-08
Payer: COMMERCIAL

## 2025-05-08 VITALS
WEIGHT: 295 LBS | OXYGEN SATURATION: 98 % | BODY MASS INDEX: 42.23 KG/M2 | TEMPERATURE: 98.4 F | HEIGHT: 70 IN | RESPIRATION RATE: 18 BRPM | HEART RATE: 81 BPM | SYSTOLIC BLOOD PRESSURE: 127 MMHG | DIASTOLIC BLOOD PRESSURE: 60 MMHG

## 2025-05-08 DIAGNOSIS — K29.00 ACUTE GASTRITIS WITHOUT HEMORRHAGE, UNSPECIFIED GASTRITIS TYPE: ICD-10-CM

## 2025-05-08 DIAGNOSIS — K52.9 GASTROENTERITIS: Primary | ICD-10-CM

## 2025-05-08 DIAGNOSIS — K20.90 ESOPHAGITIS: ICD-10-CM

## 2025-05-08 LAB
ALBUMIN SERPL-MCNC: 4.3 G/DL (ref 3.5–5.2)
ALBUMIN/GLOB SERPL: 1.4 G/DL
ALP SERPL-CCNC: 88 U/L (ref 39–117)
ALT SERPL W P-5'-P-CCNC: 50 U/L (ref 1–41)
ANION GAP SERPL CALCULATED.3IONS-SCNC: 13.8 MMOL/L (ref 5–15)
AST SERPL-CCNC: 71 U/L (ref 1–40)
BASOPHILS # BLD AUTO: 0.02 10*3/MM3 (ref 0–0.2)
BASOPHILS NFR BLD AUTO: 0.2 % (ref 0–1.5)
BILIRUB SERPL-MCNC: 0.6 MG/DL (ref 0–1.2)
BILIRUB UR QL STRIP: NEGATIVE
BUN SERPL-MCNC: 18 MG/DL (ref 8–23)
BUN/CREAT SERPL: 17.8 (ref 7–25)
CALCIUM SPEC-SCNC: 8.8 MG/DL (ref 8.6–10.5)
CHLORIDE SERPL-SCNC: 99 MMOL/L (ref 98–107)
CLARITY UR: CLEAR
CO2 SERPL-SCNC: 22.2 MMOL/L (ref 22–29)
COLOR UR: YELLOW
CREAT SERPL-MCNC: 1.01 MG/DL (ref 0.76–1.27)
CRP SERPL-MCNC: 1.88 MG/DL (ref 0–0.5)
D-LACTATE SERPL-SCNC: 1.7 MMOL/L (ref 0.5–2)
DEPRECATED RDW RBC AUTO: 41.8 FL (ref 37–54)
EGFRCR SERPLBLD CKD-EPI 2021: 83.6 ML/MIN/1.73
EOSINOPHIL # BLD AUTO: 0.23 10*3/MM3 (ref 0–0.4)
EOSINOPHIL NFR BLD AUTO: 2.5 % (ref 0.3–6.2)
ERYTHROCYTE [DISTWIDTH] IN BLOOD BY AUTOMATED COUNT: 13.1 % (ref 12.3–15.4)
GEN 5 1HR TROPONIN T REFLEX: 15 NG/L
GLOBULIN UR ELPH-MCNC: 3 GM/DL
GLUCOSE SERPL-MCNC: 239 MG/DL (ref 65–99)
GLUCOSE UR STRIP-MCNC: ABNORMAL MG/DL
HCT VFR BLD AUTO: 51.5 % (ref 37.5–51)
HGB BLD-MCNC: 17.5 G/DL (ref 13–17.7)
HGB UR QL STRIP.AUTO: NEGATIVE
HOLD SPECIMEN: NORMAL
IMM GRANULOCYTES # BLD AUTO: 0.01 10*3/MM3 (ref 0–0.05)
IMM GRANULOCYTES NFR BLD AUTO: 0.1 % (ref 0–0.5)
KETONES UR QL STRIP: NEGATIVE
LEUKOCYTE ESTERASE UR QL STRIP.AUTO: NEGATIVE
LIPASE SERPL-CCNC: 25 U/L (ref 13–60)
LYMPHOCYTES # BLD AUTO: 1.06 10*3/MM3 (ref 0.7–3.1)
LYMPHOCYTES NFR BLD AUTO: 11.7 % (ref 19.6–45.3)
MCH RBC QN AUTO: 29 PG (ref 26.6–33)
MCHC RBC AUTO-ENTMCNC: 34 G/DL (ref 31.5–35.7)
MCV RBC AUTO: 85.4 FL (ref 79–97)
MONOCYTES # BLD AUTO: 0.76 10*3/MM3 (ref 0.1–0.9)
MONOCYTES NFR BLD AUTO: 8.4 % (ref 5–12)
NEUTROPHILS NFR BLD AUTO: 6.97 10*3/MM3 (ref 1.7–7)
NEUTROPHILS NFR BLD AUTO: 77.1 % (ref 42.7–76)
NITRITE UR QL STRIP: NEGATIVE
NT-PROBNP SERPL-MCNC: 65.5 PG/ML (ref 0–900)
PH UR STRIP.AUTO: 6 [PH] (ref 5–8)
PLATELET # BLD AUTO: 245 10*3/MM3 (ref 140–450)
PMV BLD AUTO: 9.4 FL (ref 6–12)
POTASSIUM SERPL-SCNC: 3.4 MMOL/L (ref 3.5–5.2)
PROT SERPL-MCNC: 7.3 G/DL (ref 6–8.5)
PROT UR QL STRIP: NEGATIVE
RBC # BLD AUTO: 6.03 10*6/MM3 (ref 4.14–5.8)
SODIUM SERPL-SCNC: 135 MMOL/L (ref 136–145)
SP GR UR STRIP: 1.02 (ref 1–1.03)
TROPONIN T NUMERIC DELTA: -1 NG/L
TROPONIN T SERPL HS-MCNC: 16 NG/L
UROBILINOGEN UR QL STRIP: ABNORMAL
WBC NRBC COR # BLD AUTO: 9.05 10*3/MM3 (ref 3.4–10.8)
WHOLE BLOOD HOLD COAG: NORMAL
WHOLE BLOOD HOLD SPECIMEN: NORMAL

## 2025-05-08 PROCEDURE — 93005 ELECTROCARDIOGRAM TRACING: CPT | Performed by: FAMILY MEDICINE

## 2025-05-08 PROCEDURE — 25810000003 LACTATED RINGERS SOLUTION: Performed by: FAMILY MEDICINE

## 2025-05-08 PROCEDURE — 83690 ASSAY OF LIPASE: CPT | Performed by: FAMILY MEDICINE

## 2025-05-08 PROCEDURE — 83605 ASSAY OF LACTIC ACID: CPT | Performed by: FAMILY MEDICINE

## 2025-05-08 PROCEDURE — 96374 THER/PROPH/DIAG INJ IV PUSH: CPT

## 2025-05-08 PROCEDURE — 25010000002 MORPHINE PER 10 MG: Performed by: FAMILY MEDICINE

## 2025-05-08 PROCEDURE — 85025 COMPLETE CBC W/AUTO DIFF WBC: CPT | Performed by: FAMILY MEDICINE

## 2025-05-08 PROCEDURE — 84484 ASSAY OF TROPONIN QUANT: CPT | Performed by: FAMILY MEDICINE

## 2025-05-08 PROCEDURE — 83880 ASSAY OF NATRIURETIC PEPTIDE: CPT | Performed by: FAMILY MEDICINE

## 2025-05-08 PROCEDURE — 99285 EMERGENCY DEPT VISIT HI MDM: CPT | Performed by: FAMILY MEDICINE

## 2025-05-08 PROCEDURE — 74177 CT ABD & PELVIS W/CONTRAST: CPT

## 2025-05-08 PROCEDURE — 25010000002 ONDANSETRON PER 1 MG: Performed by: FAMILY MEDICINE

## 2025-05-08 PROCEDURE — 36415 COLL VENOUS BLD VENIPUNCTURE: CPT

## 2025-05-08 PROCEDURE — 25510000001 IOPAMIDOL 61 % SOLUTION: Performed by: FAMILY MEDICINE

## 2025-05-08 PROCEDURE — 80053 COMPREHEN METABOLIC PANEL: CPT | Performed by: FAMILY MEDICINE

## 2025-05-08 PROCEDURE — 96375 TX/PRO/DX INJ NEW DRUG ADDON: CPT

## 2025-05-08 PROCEDURE — 86140 C-REACTIVE PROTEIN: CPT | Performed by: FAMILY MEDICINE

## 2025-05-08 PROCEDURE — 81003 URINALYSIS AUTO W/O SCOPE: CPT | Performed by: FAMILY MEDICINE

## 2025-05-08 RX ORDER — PANTOPRAZOLE SODIUM 40 MG/1
40 TABLET, DELAYED RELEASE ORAL ONCE
Status: COMPLETED | OUTPATIENT
Start: 2025-05-08 | End: 2025-05-08

## 2025-05-08 RX ORDER — ALUMINA, MAGNESIA, AND SIMETHICONE 2400; 2400; 240 MG/30ML; MG/30ML; MG/30ML
15 SUSPENSION ORAL ONCE
Status: COMPLETED | OUTPATIENT
Start: 2025-05-08 | End: 2025-05-08

## 2025-05-08 RX ORDER — IOPAMIDOL 612 MG/ML
100 INJECTION, SOLUTION INTRAVASCULAR
Status: COMPLETED | OUTPATIENT
Start: 2025-05-08 | End: 2025-05-08

## 2025-05-08 RX ORDER — ONDANSETRON 2 MG/ML
4 INJECTION INTRAMUSCULAR; INTRAVENOUS ONCE
Status: COMPLETED | OUTPATIENT
Start: 2025-05-08 | End: 2025-05-08

## 2025-05-08 RX ORDER — SODIUM CHLORIDE 9 MG/ML
10 INJECTION, SOLUTION INTRAMUSCULAR; INTRAVENOUS; SUBCUTANEOUS AS NEEDED
Status: DISCONTINUED | OUTPATIENT
Start: 2025-05-08 | End: 2025-05-08 | Stop reason: HOSPADM

## 2025-05-08 RX ORDER — SUCRALFATE 1 G/1
1 TABLET ORAL ONCE
Status: DISCONTINUED | OUTPATIENT
Start: 2025-05-08 | End: 2025-05-08

## 2025-05-08 RX ORDER — SUCRALFATE 1 G/1
1 TABLET ORAL 4 TIMES DAILY
Qty: 120 TABLET | Refills: 0 | Status: SHIPPED | OUTPATIENT
Start: 2025-05-08 | End: 2025-05-16 | Stop reason: HOSPADM

## 2025-05-08 RX ORDER — PANTOPRAZOLE SODIUM 40 MG/1
40 TABLET, DELAYED RELEASE ORAL DAILY
Qty: 30 TABLET | Refills: 0 | Status: SHIPPED | OUTPATIENT
Start: 2025-05-08

## 2025-05-08 RX ADMIN — IOPAMIDOL 85 ML: 612 INJECTION, SOLUTION INTRAVENOUS at 10:04

## 2025-05-08 RX ADMIN — ONDANSETRON 4 MG: 2 INJECTION INTRAMUSCULAR; INTRAVENOUS at 09:06

## 2025-05-08 RX ADMIN — SODIUM CHLORIDE, POTASSIUM CHLORIDE, SODIUM LACTATE AND CALCIUM CHLORIDE 1000 ML: 600; 310; 30; 20 INJECTION, SOLUTION INTRAVENOUS at 10:16

## 2025-05-08 RX ADMIN — SODIUM CHLORIDE, POTASSIUM CHLORIDE, SODIUM LACTATE AND CALCIUM CHLORIDE 1000 ML: 600; 310; 30; 20 INJECTION, SOLUTION INTRAVENOUS at 09:04

## 2025-05-08 RX ADMIN — PANTOPRAZOLE SODIUM 40 MG: 40 TABLET, DELAYED RELEASE ORAL at 11:11

## 2025-05-08 RX ADMIN — MORPHINE SULFATE 4 MG: 4 INJECTION, SOLUTION INTRAMUSCULAR; INTRAVENOUS at 09:07

## 2025-05-08 RX ADMIN — ALUMINUM HYDROXIDE, MAGNESIUM HYDROXIDE, AND DIMETHICONE 15 ML: 400; 400; 40 SUSPENSION ORAL at 11:12

## 2025-05-08 NOTE — Clinical Note
[facility] EMERGENCY DEPARTMENT [place]  [address]  [facility] [address]  Phone: [phone]  Fax: [phone]    Joselito Chowdary was seen and treated in our emergency department on 5/8/2025.  He may return to work on 05/10/2025.         Thank you for choosing [facility].    Odalis Fan, DO      
[facility] EMERGENCY DEPARTMENT [place]  [address]  [facility] [address]  Phone: [phone]  Fax: [phone]    Joselito Chowdary was seen and treated in our emergency department on 5/8/2025.  He may return to work on 05/10/2025.         Thank you for choosing [facility].    Odalis Fan, DO      
[facility] EMERGENCY DEPARTMENT [place]  [address]  [facility] [address]  Phone: [phone]  Fax: [phone]    Joselito Chowdary was seen and treated in our emergency department on 5/8/2025.  He may return to work on 05/10/2025.         Thank you for choosing [facility].    Odalis Fan, DO      
[facility] EMERGENCY DEPARTMENT [place]  [address]  [facility] [address]  Phone: [phone]  Fax: [phone]    Joselito Chowdary was seen and treated in our emergency department on 5/8/2025.  He may return to work on 05/10/2025.         Thank you for choosing [facility].    Odalis Fan, DO      
[employer]
[facility] EMERGENCY DEPARTMENT [place]  [address]  [facility] [address]  Phone: [phone]  Fax: [phone]    Joselito Chowdary was seen and treated in our emergency department on 5/8/2025.  He may return to work on 05/10/2025.         Thank you for choosing [facility].    Odalis Fan, DO      
[facility] EMERGENCY DEPARTMENT [place]  [address]  [facility] [address]  Phone: [phone]  Fax: [phone]    Joselito Chowdary was seen and treated in our emergency department on 5/8/2025.  He may return to work on 05/10/2025.         Thank you for choosing [facility].    Odalis Fan, DO      
No

## 2025-05-08 NOTE — FSED PROVIDER NOTE
Subjective   History of Present Illness  Patient is a 65-year-old male presents emergency department complaining of epigastric pain that started last night.  The patient states that on Friday he had old chanelltzayda Thakuro that was not refrigerated appropriately and feels that he may have contracted food poisoning from this.  From Friday until a couple days ago he was having nausea, vomiting and diarrhea.  Nausea vomiting diarrhea have improved but now the patient has developed severe epigastric abdominal pain.  This prompted him to come to the ED for evaluation.  The patient has not had any fever or chills and last bowel movement was yesterday which he describes as soft.  He has not noted any blood in his stool or his vomitus.  Last episode of vomiting was several days ago.  The patient denies any chest pain or shortness of breath.  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  He has no additional symptoms at this time.      Review of Systems   Constitutional:  Positive for appetite change. Negative for activity change, chills, diaphoresis, fatigue and fever.   HENT:  Negative for congestion, rhinorrhea, sinus pressure, sinus pain, sneezing and sore throat.    Eyes:  Negative for discharge and itching.   Respiratory:  Negative for apnea, cough, choking, chest tightness, shortness of breath and wheezing.    Cardiovascular:  Negative for chest pain, palpitations and leg swelling.   Gastrointestinal:  Positive for abdominal pain, diarrhea and nausea. Negative for abdominal distention, constipation and vomiting.   Endocrine: Negative for cold intolerance and heat intolerance.   Genitourinary:  Negative for difficulty urinating, dysuria, flank pain and hematuria.   Musculoskeletal:  Negative for arthralgias and back pain.   Neurological:  Negative for dizziness and light-headedness.   Psychiatric/Behavioral:  Negative for agitation and confusion.        Past Medical History:   Diagnosis Date     Acromioclavicular separation 2018    Same as above problem    Bipolar 1 disorder     Diabetes mellitus     Dislocation, shoulder 2018    Same    Diverticulitis     Hyperlipidemia     Hypertension     Knee swelling     On first visit    Mood disorder     Respiratory infection     Rotator cuff syndrome 2018    R Shoulder       Allergies   Allergen Reactions    Atorvastatin Other (See Comments)     Muscle pain    Zestoretic [Lisinopril-Hydrochlorothiazide] Cough       Past Surgical History:   Procedure Laterality Date    CARDIAC CATHETERIZATION      CARDIAC CATHETERIZATION N/A 2021    Procedure: Left Heart Cath;  Surgeon: Lois Levy MD;  Location: [facility] CATH INVASIVE LOCATION;  Service: Cardiology;  Laterality: N/A;    CHOLECYSTECTOMY      COLONOSCOPY      GALLBLADDER SURGERY      SHOULDER SURGERY Right     REPAIR     TRIGGER POINT INJECTION      First and subsequent visits       Family History   Problem Relation Age of Onset    Cancer Mother     Hypertension Mother     Deep vein thrombosis Mother     Diabetes Father     Heart disease Father     Hypertension Father     COPD Father     Heart failure Father        Social History     Socioeconomic History    Marital status:    Tobacco Use    Smoking status: Former     Current packs/day: 0.00     Average packs/day: 1 pack/day for 10.0 years (10.0 ttl pk-yrs)     Types: Cigarettes     Start date: 1978     Quit date: 1986     Years since quittin.3    Smokeless tobacco: Never   Vaping Use    Vaping status: Never Used   Substance and Sexual Activity    Alcohol use: Not Currently     Comment: Occasional drink    Drug use: Never    Sexual activity: Not Currently     Partners: Female     Birth control/protection: None           Objective   Physical Exam  Vitals and nursing note reviewed.   Constitutional:       General: He is not in acute distress.     Appearance: He is well-developed and normal weight. He is not ill-appearing,  toxic-appearing or diaphoretic.   HENT:      Head: Normocephalic and atraumatic.      Mouth/Throat:      Mouth: Mucous membranes are moist.      Pharynx: Oropharynx is clear. No pharyngeal swelling or oropharyngeal exudate.   Eyes:      General: No scleral icterus.     Extraocular Movements: Extraocular movements intact.   Cardiovascular:      Rate and Rhythm: Normal rate and regular rhythm.      Heart sounds: Normal heart sounds. No murmur heard.     No friction rub. No gallop.   Pulmonary:      Effort: Pulmonary effort is normal. No respiratory distress.      Breath sounds: Normal breath sounds. No stridor. No wheezing, rhonchi or rales.   Chest:      Chest wall: No tenderness.   Abdominal:      General: Abdomen is flat. Bowel sounds are normal. There is no distension or abdominal bruit. There are no signs of injury.      Palpations: Abdomen is soft. There is no shifting dullness, fluid wave, hepatomegaly, splenomegaly, mass or pulsatile mass.      Tenderness: There is generalized abdominal tenderness and tenderness in the epigastric area and periumbilical area. There is no right CVA tenderness, left CVA tenderness, guarding or rebound. Negative signs include Ayala's sign and Rovsing's sign.   Skin:     General: Skin is warm and dry.      Capillary Refill: Capillary refill takes less than 2 seconds.      Coloration: Skin is not cyanotic, jaundiced, mottled or pale.   Neurological:      General: No focal deficit present.      Mental Status: He is alert.      Cranial Nerves: No cranial nerve deficit.      Motor: No weakness.         Procedures           ED Course  ED Course as of 05/08/25 1225   u May 08, 2025   0908 2021 cath findings:   Angiographic Findings:      LMCA: Left main coronary artery gives rise to the LAD and circumflex vessels and is free of disease.     Circumflex: Circumflex coronary is dominant for the posterior circulation gives rise to a large first obtuse marginal branch moderate to large  second obtuse marginal branch large third obtuse marginal branch which serves as the PDA and a posterolateral branch.  The circumflex and its branches are free of disease.     LAD: The LAD gives rise to a small first diagonal branch moderate second diagonal branch, 3 additional small diagonal branches and terminates as a small apical recurrent branch.  The LAD and its branches contain no disease.     RCA: The right coronary is nondominant for the posterior circulation and gives rise to 3 RV marginal branches.  The right coronary and its branches contain no disease.   [EG]   0908 CBC & Differential(!) [EG]   0908 Comprehensive Metabolic Panel(!) [EG]   0909 Potassium(!): 3.4 [EG]   0909 ALT (SGPT)(!): 50 [EG]   0909 AST (SGOT)(!): 71 [EG]   0909 Lactate: 1.7 [EG]   0909 Lipase: 25 [EG]   0918 EKG is normal sinus rhythm with a rate of 89 bpm ID interval is 146 ms QRS duration is 86 ms and QTc is 474 ms some flattened T waves in lateral leads however no acute abnormalities [EG]      ED Course User Index  [EG] Odalis Fan, DO                                           Medical Decision Making  Patient is a 65-year-old male presents emergency department complaining of epigastric pain that started last night.  The patient states that on Friday he had old fettuccine [place] that was not refrigerated appropriately and feels that he may have contracted food poisoning from this.  From Friday until a couple days ago he was having nausea, vomiting and diarrhea.  Nausea vomiting diarrhea have improved but now the patient has developed severe epigastric abdominal pain.  This prompted him to come to the ED for evaluation.  The patient has not had any fever or chills and last bowel movement was yesterday which he describes as soft.  He has not noted any blood in his stool or his vomitus.  Last episode of vomiting was several days ago.  The patient denies any chest pain or shortness of breath.  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  He has no additional symptoms at this time.    Patient 's workup was negative for infectious process.  White count was normal.  Hemoglobin hematocrit was normal as well.  There is a mild elevation of liver enzymes.  CT imaging showed possible liver cirrhosis and I counseled the patient on this finding advised that he stop any alcohol use as this could make his liver condition worse.  He showed some inflammation around his duodenum consistent with recent gastroenteritis.  The pain he described was consistent with gastritis/gastroenteritis so I did prescribe him Protonix, Carafate.  Due to current symptoms I offered him medications here and the patient agreed to try a GI cocktail, Protonix prior to discharge.  He was discharged home in stable condition with a work note for him to return on Saturday.  Cardiac workup was also negative    Problems Addressed:  Acute gastritis without hemorrhage, unspecified gastritis type: complicated acute illness or injury  Esophagitis: complicated acute illness or injury  Gastroenteritis: complicated acute illness or injury    Amount and/or Complexity of Data Reviewed  Labs: ordered. Decision-making details documented in ED Course.  Radiology: ordered.  ECG/medicine tests: ordered.    Risk  OTC drugs.  Prescription drug management.        Final diagnoses:   Gastroenteritis   Acute gastritis without hemorrhage, unspecified gastritis type   Esophagitis       ED Disposition  ED Disposition       ED Disposition   Discharge    Condition   Stable    Comment   --               Melissa Guerra, APRN  [address] [address] [phone]    Schedule an appointment as soon as possible for a visit in 2 days      [facility] EMERGENCY DEPARTMENT [place]  [address]  [facility] [address] [phone]  Go to   If symptoms worsen         Medication List        New Prescriptions      pantoprazole 40 MG EC  tablet  Commonly known as: PROTONIX  Take 1 tablet by mouth Daily.     sucralfate 1 g tablet  Commonly known as: CARAFATE  Take 1 tablet by mouth 4 (Four) Times a Day.               Where to Get Your Medications        These medications were sent to [facility] - [place] [address]      Hours: Monday to Friday 7 AM to 5:30 PM, Saturday & Sunday 8 AM to 4:30 PM Phone: [phone]   pantoprazole 40 MG EC tablet  sucralfate 1 g tablet

## 2025-05-09 LAB
QT INTERVAL: 390 MS
QTC INTERVAL: 474 MS

## 2025-05-10 ENCOUNTER — HOSPITAL ENCOUNTER (OUTPATIENT)
Facility: HOSPITAL | Age: 65
Setting detail: OBSERVATION
Discharge: HOME | End: 2025-05-11
Attending: EMERGENCY MEDICINE | Admitting: EMERGENCY MEDICINE
Payer: COMMERCIAL

## 2025-05-10 ENCOUNTER — APPOINTMENT (OUTPATIENT)
Facility: HOSPITAL | Age: 65
End: 2025-05-10
Payer: COMMERCIAL

## 2025-05-10 DIAGNOSIS — R19.7 DIARRHEA, UNSPECIFIED TYPE: ICD-10-CM

## 2025-05-10 DIAGNOSIS — R11.2 NAUSEA AND VOMITING, UNSPECIFIED VOMITING TYPE: Primary | ICD-10-CM

## 2025-05-10 PROBLEM — K56.7 ILEUS: Status: ACTIVE | Noted: 2025-05-10

## 2025-05-10 LAB
ALBUMIN SERPL-MCNC: 3 G/DL (ref 3.5–5.2)
ALBUMIN/GLOB SERPL: 1.3 G/DL
ALP SERPL-CCNC: 56 U/L (ref 39–117)
ALT SERPL W P-5'-P-CCNC: 42 U/L (ref 1–41)
ANION GAP SERPL CALCULATED.3IONS-SCNC: 12.4 MMOL/L (ref 5–15)
AST SERPL-CCNC: 35 U/L (ref 1–40)
BASOPHILS # BLD AUTO: 0.01 10*3/MM3 (ref 0–0.2)
BASOPHILS NFR BLD AUTO: 0.2 % (ref 0–1.5)
BILIRUB SERPL-MCNC: 1 MG/DL (ref 0–1.2)
BILIRUB UR QL STRIP: NEGATIVE
BUN SERPL-MCNC: 12 MG/DL (ref 8–23)
BUN/CREAT SERPL: 11.4 (ref 7–25)
CALCIUM SPEC-SCNC: 7.3 MG/DL (ref 8.6–10.5)
CHLORIDE SERPL-SCNC: 97 MMOL/L (ref 98–107)
CLARITY UR: CLEAR
CO2 SERPL-SCNC: 22.6 MMOL/L (ref 22–29)
COLOR UR: YELLOW
CREAT SERPL-MCNC: 1.05 MG/DL (ref 0.76–1.27)
D-LACTATE SERPL-SCNC: 2 MMOL/L (ref 0.5–2)
DEPRECATED RDW RBC AUTO: 41.2 FL (ref 37–54)
EGFRCR SERPLBLD CKD-EPI 2021: 79.8 ML/MIN/1.73
EOSINOPHIL # BLD AUTO: 0.02 10*3/MM3 (ref 0–0.4)
EOSINOPHIL NFR BLD AUTO: 0.5 % (ref 0.3–6.2)
ERYTHROCYTE [DISTWIDTH] IN BLOOD BY AUTOMATED COUNT: 13.2 % (ref 12.3–15.4)
GLOBULIN UR ELPH-MCNC: 2.3 GM/DL
GLUCOSE BLDC GLUCOMTR-MCNC: 243 MG/DL (ref 70–130)
GLUCOSE BLDC GLUCOMTR-MCNC: 252 MG/DL (ref 70–130)
GLUCOSE SERPL-MCNC: 342 MG/DL (ref 65–99)
GLUCOSE UR STRIP-MCNC: ABNORMAL MG/DL
HCT VFR BLD AUTO: 47.8 % (ref 37.5–51)
HGB BLD-MCNC: 16.4 G/DL (ref 13–17.7)
HGB UR QL STRIP.AUTO: NEGATIVE
HOLD SPECIMEN: NORMAL
IMM GRANULOCYTES # BLD AUTO: 0.02 10*3/MM3 (ref 0–0.05)
IMM GRANULOCYTES NFR BLD AUTO: 0.5 % (ref 0–0.5)
KETONES UR QL STRIP: ABNORMAL
LEUKOCYTE ESTERASE UR QL STRIP.AUTO: NEGATIVE
LIPASE SERPL-CCNC: 12 U/L (ref 13–60)
LYMPHOCYTES # BLD AUTO: 0.6 10*3/MM3 (ref 0.7–3.1)
LYMPHOCYTES NFR BLD AUTO: 14.1 % (ref 19.6–45.3)
MAGNESIUM SERPL-MCNC: 1.8 MG/DL (ref 1.6–2.4)
MCH RBC QN AUTO: 29.2 PG (ref 26.6–33)
MCHC RBC AUTO-ENTMCNC: 34.3 G/DL (ref 31.5–35.7)
MCV RBC AUTO: 85.2 FL (ref 79–97)
MONOCYTES # BLD AUTO: 0.81 10*3/MM3 (ref 0.1–0.9)
MONOCYTES NFR BLD AUTO: 19 % (ref 5–12)
NEUTROPHILS NFR BLD AUTO: 2.81 10*3/MM3 (ref 1.7–7)
NEUTROPHILS NFR BLD AUTO: 65.7 % (ref 42.7–76)
NITRITE UR QL STRIP: NEGATIVE
PH UR STRIP.AUTO: 6 [PH] (ref 5–8)
PLATELET # BLD AUTO: 248 10*3/MM3 (ref 140–450)
PMV BLD AUTO: 9.4 FL (ref 6–12)
POTASSIUM SERPL-SCNC: 3.6 MMOL/L (ref 3.5–5.2)
PROT SERPL-MCNC: 5.3 G/DL (ref 6–8.5)
PROT UR QL STRIP: ABNORMAL
RBC # BLD AUTO: 5.61 10*6/MM3 (ref 4.14–5.8)
SODIUM SERPL-SCNC: 132 MMOL/L (ref 136–145)
SP GR UR STRIP: 1.01 (ref 1–1.03)
UROBILINOGEN UR QL STRIP: ABNORMAL
WBC NRBC COR # BLD AUTO: 4.27 10*3/MM3 (ref 3.4–10.8)
WHOLE BLOOD HOLD COAG: NORMAL
WHOLE BLOOD HOLD SPECIMEN: NORMAL

## 2025-05-10 PROCEDURE — 81003 URINALYSIS AUTO W/O SCOPE: CPT | Performed by: EMERGENCY MEDICINE

## 2025-05-10 PROCEDURE — 25010000002 ONDANSETRON PER 1 MG: Performed by: NURSE PRACTITIONER

## 2025-05-10 PROCEDURE — 83605 ASSAY OF LACTIC ACID: CPT | Performed by: EMERGENCY MEDICINE

## 2025-05-10 PROCEDURE — 63710000001 INSULIN REGULAR HUMAN PER 5 UNITS: Performed by: NURSE PRACTITIONER

## 2025-05-10 PROCEDURE — 85025 COMPLETE CBC W/AUTO DIFF WBC: CPT | Performed by: EMERGENCY MEDICINE

## 2025-05-10 PROCEDURE — 99285 EMERGENCY DEPT VISIT HI MDM: CPT | Performed by: EMERGENCY MEDICINE

## 2025-05-10 PROCEDURE — G0378 HOSPITAL OBSERVATION PER HR: HCPCS

## 2025-05-10 PROCEDURE — 96374 THER/PROPH/DIAG INJ IV PUSH: CPT

## 2025-05-10 PROCEDURE — 96376 TX/PRO/DX INJ SAME DRUG ADON: CPT

## 2025-05-10 PROCEDURE — 87040 BLOOD CULTURE FOR BACTERIA: CPT | Performed by: EMERGENCY MEDICINE

## 2025-05-10 PROCEDURE — 25510000001 IOPAMIDOL PER 1 ML: Performed by: EMERGENCY MEDICINE

## 2025-05-10 PROCEDURE — 80053 COMPREHEN METABOLIC PANEL: CPT | Performed by: EMERGENCY MEDICINE

## 2025-05-10 PROCEDURE — 82948 REAGENT STRIP/BLOOD GLUCOSE: CPT

## 2025-05-10 PROCEDURE — 96375 TX/PRO/DX INJ NEW DRUG ADDON: CPT

## 2025-05-10 PROCEDURE — 25010000002 MORPHINE PER 10 MG: Performed by: EMERGENCY MEDICINE

## 2025-05-10 PROCEDURE — 74174 CTA ABD&PLVS W/CONTRAST: CPT

## 2025-05-10 PROCEDURE — 25810000003 SODIUM CHLORIDE 0.9 % SOLUTION: Performed by: EMERGENCY MEDICINE

## 2025-05-10 PROCEDURE — 25010000002 ONDANSETRON PER 1 MG: Performed by: EMERGENCY MEDICINE

## 2025-05-10 PROCEDURE — 83735 ASSAY OF MAGNESIUM: CPT | Performed by: NURSE PRACTITIONER

## 2025-05-10 PROCEDURE — 83690 ASSAY OF LIPASE: CPT | Performed by: EMERGENCY MEDICINE

## 2025-05-10 RX ORDER — HYDRALAZINE HYDROCHLORIDE 20 MG/ML
10 INJECTION INTRAMUSCULAR; INTRAVENOUS EVERY 6 HOURS PRN
Status: DISCONTINUED | OUTPATIENT
Start: 2025-05-10 | End: 2025-05-11 | Stop reason: HOSPADM

## 2025-05-10 RX ORDER — MORPHINE SULFATE 2 MG/ML
2 INJECTION, SOLUTION INTRAMUSCULAR; INTRAVENOUS EVERY 4 HOURS PRN
Status: DISCONTINUED | OUTPATIENT
Start: 2025-05-10 | End: 2025-05-11 | Stop reason: HOSPADM

## 2025-05-10 RX ORDER — HYDROCHLOROTHIAZIDE 25 MG/1
12.5 TABLET ORAL DAILY
Status: DISCONTINUED | OUTPATIENT
Start: 2025-05-11 | End: 2025-05-10

## 2025-05-10 RX ORDER — LOSARTAN POTASSIUM 50 MG/1
100 TABLET ORAL
Status: DISCONTINUED | OUTPATIENT
Start: 2025-05-10 | End: 2025-05-10

## 2025-05-10 RX ORDER — PANTOPRAZOLE SODIUM 40 MG/1
40 TABLET, DELAYED RELEASE ORAL
Status: DISCONTINUED | OUTPATIENT
Start: 2025-05-11 | End: 2025-05-11

## 2025-05-10 RX ORDER — LOSARTAN POTASSIUM 50 MG/1
100 TABLET ORAL
Status: DISCONTINUED | OUTPATIENT
Start: 2025-05-10 | End: 2025-05-11 | Stop reason: HOSPADM

## 2025-05-10 RX ORDER — IBUPROFEN 600 MG/1
1 TABLET ORAL
Status: DISCONTINUED | OUTPATIENT
Start: 2025-05-10 | End: 2025-05-11 | Stop reason: HOSPADM

## 2025-05-10 RX ORDER — DEXTROSE MONOHYDRATE 25 G/50ML
25 INJECTION, SOLUTION INTRAVENOUS
Status: DISCONTINUED | OUTPATIENT
Start: 2025-05-10 | End: 2025-05-11 | Stop reason: HOSPADM

## 2025-05-10 RX ORDER — BUPROPION HYDROCHLORIDE 150 MG/1
300 TABLET ORAL DAILY
Status: DISCONTINUED | OUTPATIENT
Start: 2025-05-11 | End: 2025-05-11 | Stop reason: HOSPADM

## 2025-05-10 RX ORDER — IOPAMIDOL 755 MG/ML
100 INJECTION, SOLUTION INTRAVASCULAR
Status: COMPLETED | OUTPATIENT
Start: 2025-05-10 | End: 2025-05-10

## 2025-05-10 RX ORDER — SODIUM CHLORIDE 9 MG/ML
100 INJECTION, SOLUTION INTRAVENOUS CONTINUOUS
Status: ACTIVE | OUTPATIENT
Start: 2025-05-10 | End: 2025-05-11

## 2025-05-10 RX ORDER — NICOTINE POLACRILEX 4 MG
15 LOZENGE BUCCAL
Status: DISCONTINUED | OUTPATIENT
Start: 2025-05-10 | End: 2025-05-11 | Stop reason: HOSPADM

## 2025-05-10 RX ORDER — SODIUM CHLORIDE 0.9 % (FLUSH) 0.9 %
10 SYRINGE (ML) INJECTION AS NEEDED
Status: DISCONTINUED | OUTPATIENT
Start: 2025-05-10 | End: 2025-05-11 | Stop reason: HOSPADM

## 2025-05-10 RX ORDER — SODIUM CHLORIDE 9 MG/ML
40 INJECTION, SOLUTION INTRAVENOUS AS NEEDED
Status: DISCONTINUED | OUTPATIENT
Start: 2025-05-10 | End: 2025-05-11 | Stop reason: HOSPADM

## 2025-05-10 RX ORDER — ONDANSETRON 2 MG/ML
4 INJECTION INTRAMUSCULAR; INTRAVENOUS EVERY 6 HOURS PRN
Status: DISCONTINUED | OUTPATIENT
Start: 2025-05-10 | End: 2025-05-11 | Stop reason: HOSPADM

## 2025-05-10 RX ORDER — ONDANSETRON 2 MG/ML
4 INJECTION INTRAMUSCULAR; INTRAVENOUS ONCE
Status: COMPLETED | OUTPATIENT
Start: 2025-05-10 | End: 2025-05-10

## 2025-05-10 RX ORDER — ATORVASTATIN CALCIUM 40 MG/1
20 TABLET, FILM COATED ORAL NIGHTLY
Status: DISCONTINUED | OUTPATIENT
Start: 2025-05-10 | End: 2025-05-11 | Stop reason: HOSPADM

## 2025-05-10 RX ORDER — ACETAMINOPHEN 325 MG/1
650 TABLET ORAL EVERY 4 HOURS PRN
Status: DISCONTINUED | OUTPATIENT
Start: 2025-05-10 | End: 2025-05-11 | Stop reason: HOSPADM

## 2025-05-10 RX ORDER — HYDROCHLOROTHIAZIDE 25 MG/1
25 TABLET ORAL DAILY
Status: DISCONTINUED | OUTPATIENT
Start: 2025-05-11 | End: 2025-05-11 | Stop reason: HOSPADM

## 2025-05-10 RX ORDER — SODIUM CHLORIDE 0.9 % (FLUSH) 0.9 %
10 SYRINGE (ML) INJECTION EVERY 12 HOURS SCHEDULED
Status: DISCONTINUED | OUTPATIENT
Start: 2025-05-10 | End: 2025-05-11 | Stop reason: HOSPADM

## 2025-05-10 RX ORDER — SODIUM CHLORIDE 9 MG/ML
100 INJECTION, SOLUTION INTRAVENOUS CONTINUOUS
Status: DISCONTINUED | OUTPATIENT
Start: 2025-05-10 | End: 2025-05-10

## 2025-05-10 RX ORDER — SCOPOLAMINE 1 MG/3D
1 PATCH, EXTENDED RELEASE TRANSDERMAL
Status: DISCONTINUED | OUTPATIENT
Start: 2025-05-10 | End: 2025-05-11 | Stop reason: HOSPADM

## 2025-05-10 RX ADMIN — SODIUM CHLORIDE 100 ML/HR: 9 INJECTION, SOLUTION INTRAVENOUS at 16:27

## 2025-05-10 RX ADMIN — ACETAMINOPHEN 650 MG: 325 TABLET ORAL at 20:10

## 2025-05-10 RX ADMIN — LOSARTAN POTASSIUM 100 MG: 50 TABLET, FILM COATED ORAL at 16:25

## 2025-05-10 RX ADMIN — LAMOTRIGINE 150 MG: 100 TABLET ORAL at 20:06

## 2025-05-10 RX ADMIN — ONDANSETRON 4 MG: 2 INJECTION INTRAMUSCULAR; INTRAVENOUS at 16:25

## 2025-05-10 RX ADMIN — MORPHINE SULFATE 2 MG: 2 INJECTION, SOLUTION INTRAMUSCULAR; INTRAVENOUS at 16:25

## 2025-05-10 RX ADMIN — IOPAMIDOL 97 ML: 755 INJECTION, SOLUTION INTRAVENOUS at 14:41

## 2025-05-10 RX ADMIN — MORPHINE SULFATE 4 MG: 4 INJECTION, SOLUTION INTRAMUSCULAR; INTRAVENOUS at 14:28

## 2025-05-10 RX ADMIN — MORPHINE SULFATE 2 MG: 2 INJECTION, SOLUTION INTRAMUSCULAR; INTRAVENOUS at 20:06

## 2025-05-10 RX ADMIN — SODIUM CHLORIDE 1000 ML: 9 INJECTION, SOLUTION INTRAVENOUS at 12:46

## 2025-05-10 RX ADMIN — ONDANSETRON 4 MG: 2 INJECTION INTRAMUSCULAR; INTRAVENOUS at 14:29

## 2025-05-10 RX ADMIN — SCOPOLAMINE 1 PATCH: 1.5 PATCH, EXTENDED RELEASE TRANSDERMAL at 16:25

## 2025-05-10 RX ADMIN — HUMAN INSULIN 4 UNITS: 100 INJECTION, SOLUTION SUBCUTANEOUS at 17:54

## 2025-05-10 RX ADMIN — ATORVASTATIN CALCIUM 20 MG: 40 TABLET, FILM COATED ORAL at 20:06

## 2025-05-10 NOTE — H&P
[facility]   HISTORY AND PHYSICAL    Patient Name: Joselito Chowdary III  : 1960  MRN: [id number]  Primary Care Physician:  Melissa Guerra APRN  Date of admission: 5/10/2025    Subjective   Subjective     Chief Complaint:     Abdominal Pain  Associated symptoms: nausea        Review of Systems   Constitutional:  Positive for activity change and fatigue.   HENT: Negative.     Respiratory: Negative.     Cardiovascular: Negative.    Gastrointestinal:  Positive for abdominal pain and nausea.   Endocrine: Negative.    Genitourinary: Negative.    Musculoskeletal: Negative.    Skin: Negative.    Neurological: Negative.    Psychiatric/Behavioral: Negative.          Personal History     Past Medical History:   Diagnosis Date    Acromioclavicular separation 2018    Same as above problem    Bipolar 1 disorder     Diabetes mellitus     Dislocation, shoulder 2018    Same    Diverticulitis     Hyperlipidemia     Hypertension     Knee swelling     On first visit    Mood disorder     Respiratory infection     Rotator cuff syndrome 2018    R Shoulder       Past Surgical History:   Procedure Laterality Date    CARDIAC CATHETERIZATION      CARDIAC CATHETERIZATION N/A 2021    Procedure: Left Heart Cath;  Surgeon: Lois Levy MD;  Location: [facility] CATH INVASIVE LOCATION;  Service: Cardiology;  Laterality: N/A;    CHOLECYSTECTOMY      COLONOSCOPY      GALLBLADDER SURGERY      SHOULDER SURGERY Right     REPAIR     TRIGGER POINT INJECTION      First and subsequent visits       Family History: family history includes COPD in his father; Cancer in his mother; Deep vein thrombosis in his mother; Diabetes in his father; Heart disease in his father; Heart failure in his father; Hypertension in his father and mother. Otherwise pertinent FHx was reviewed and not pertinent to current issue.    Social History:  reports that he quit smoking about 39 years ago. His smoking use included cigarettes. He  started smoking about 47 years ago. He has a 10 pack-year smoking history. He has never used smokeless tobacco. He reports that he does not currently use alcohol. He reports that he does not use drugs.    Home Medications:  FLUoxetine, Insulin Degludec, Insulin Glargine, Insulin Pen Needle, Pen Needles, Tirzepatide, albuterol, ammonium lactate, aspirin, buPROPion XL, bumetanide, empagliflozin, glucose blood, insulin aspart, lamoTRIgine, losartan, losartan-hydrochlorothiazide, metFORMIN ER, ondansetron ODT, pantoprazole, pravastatin, and sucralfate    Allergies:  Allergies   Allergen Reactions    Atorvastatin Other (See Comments)     Muscle pain    Zestoretic [Lisinopril-Hydrochlorothiazide] Cough       Objective    Objective     Vitals:   Temp:  [99.8 °F (37.7 °C)-100.8 °F (38.2 °C)] 99.8 °F (37.7 °C)  Heart Rate:  [] 94  Resp:  [22] 22  BP: (138-147)/(52-77) 142/52    Physical Exam  Vitals and nursing note reviewed. Exam conducted with a chaperone present.   Constitutional:       Appearance: Normal appearance. He is obese.   HENT:      Head: Normocephalic and atraumatic.   Cardiovascular:      Rate and Rhythm: Normal rate and regular rhythm.   Pulmonary:      Effort: Pulmonary effort is normal.      Breath sounds: Normal breath sounds.   Abdominal:      General: Bowel sounds are normal.      Palpations: Abdomen is soft.      Tenderness: There is abdominal tenderness.   Musculoskeletal:         General: Normal range of motion.   Skin:     General: Skin is warm and dry.   Neurological:      General: No focal deficit present.      Mental Status: He is alert and oriented to person, place, and time.   Psychiatric:         Mood and Affect: Mood normal.         Behavior: Behavior normal.         Result Review    Result Review:  I have personally reviewed the results from the time of this admission to 5/10/2025 16:00 EDT and agree with these findings:  [x]  Laboratory list / accordion  []  Microbiology  [x]   Radiology  [x]  EKG/Telemetry   []  Cardiology/Vascular   []  Pathology  [x]  Old records  []  Other:        Assessment & Plan   Assessment / Plan     Brief Patient Summary:  Joselito Chowdary III is a 65 y.o. male who presents to the emergency department for abdominal pain was seen here several days ago for similar abdominal pain has not gotten any better he continues to have nausea and vomiting which somewhat improved though not resolved as well as diarrhea. Vomit and stool are both nonbloody. He denies any alleviating or exacerbating factors. Says the pain is sharp in nature located on the middle of his abdomen has not radiated or moved. He denies any known fever. Patient to be admitted to CDU for continued observation and medical management for intractable nausea and vomiting secondary to ileus.    Active Hospital Problems:  Active Hospital Problems    Diagnosis     **Ileus      Plan:     #Ileus  #Intractable nausea/vomiting  -CTA abdomen pelvis: findings suggestive of ileus. This has worsened/progressed compared to prior CT  -PRN IV antiemetics and analgesia  -NS @ 100 cc/hr  -NPO x sips    #HTN  -Continue home Hyzaar  -PRN IV Hydralazine for SBP > 160  -Cardiac monitoring    #HLD  -Continue home Atorvastatin    #T2DM  -FSBG q6 hours, SSI  -Hypoglycemia protocol  -Hold home Metformin, Jardiance    #GERD  -Continue home Protonix    #Depression with anxiety  #Bipolar disorder  -Continue home Wellbutrin, Prozac  -Continue home Lamictal    #Morbidly obese  -BMI 42.33  -May complicate all aspects of care    #PPX  -SCDs  -Protonix    CODE STATUS:    Code Status (Patient has no pulse and is not breathing): CPR (Attempt to Resuscitate)  Medical Interventions (Patient has pulse or is breathing): Full Support  Level Of Support Discussed With: Patient    Admission Status:  I believe this patient meets observation status.    Miki Garrido, APRN

## 2025-05-10 NOTE — FSED PROVIDER NOTE
Subjective   History of Present Illness  Patient presents to the emergency department for abdominal pain was seen here several days ago for similar abdominal pain has not gotten any better he continues to have nausea and vomiting which somewhat improved though not resolved as well as diarrhea.  Vomit and stool are both nonbloody.  He denies any alleviating or exacerbating factors.  Says the pain is sharp in nature located on the middle of his abdomen has not radiated or moved.  He denies any known fever.    History provided by:  Patient   used: No        Review of Systems   Gastrointestinal:  Positive for abdominal pain, diarrhea, nausea and vomiting.   All other systems reviewed and are negative.      Past Medical History:   Diagnosis Date    Acromioclavicular separation 04/2018    Same as above problem    Bipolar 1 disorder     Diabetes mellitus     Dislocation, shoulder 04/2018    Same    Diverticulitis     Hyperlipidemia     Hypertension     Knee swelling     On first visit    Mood disorder     Respiratory infection     Rotator cuff syndrome 04/2018    R Shoulder       Allergies   Allergen Reactions    Atorvastatin Other (See Comments)     Muscle pain    Zestoretic [Lisinopril-Hydrochlorothiazide] Cough       Past Surgical History:   Procedure Laterality Date    CARDIAC CATHETERIZATION  2012    CARDIAC CATHETERIZATION N/A 07/13/2021    Procedure: Left Heart Cath;  Surgeon: Lois Levy MD;  Location: [facility] CATH INVASIVE LOCATION;  Service: Cardiology;  Laterality: N/A;    CHOLECYSTECTOMY      COLONOSCOPY      GALLBLADDER SURGERY      SHOULDER SURGERY Right     REPAIR     TRIGGER POINT INJECTION      First and subsequent visits       Family History   Problem Relation Age of Onset    Cancer Mother     Hypertension Mother     Deep vein thrombosis Mother     Diabetes Father     Heart disease Father     Hypertension Father     COPD Father     Heart failure Father        Social History      Socioeconomic History    Marital status:    Tobacco Use    Smoking status: Former     Current packs/day: 0.00     Average packs/day: 1 pack/day for 10.0 years (10.0 ttl pk-yrs)     Types: Cigarettes     Start date: 1978     Quit date: 1986     Years since quittin.3    Smokeless tobacco: Never   Vaping Use    Vaping status: Never Used   Substance and Sexual Activity    Alcohol use: Not Currently     Comment: Occasional drink    Drug use: Never    Sexual activity: Not Currently     Partners: Female     Birth control/protection: None           Objective   Physical Exam  Vitals and nursing note reviewed.   Constitutional:       Appearance: He is well-developed. He is obese. He is not toxic-appearing.   HENT:      Head: Normocephalic and atraumatic.      Mouth/Throat:      Mouth: Mucous membranes are moist.      Pharynx: Oropharynx is clear.   Eyes:      Extraocular Movements: Extraocular movements intact.      Pupils: Pupils are equal, round, and reactive to light.   Cardiovascular:      Rate and Rhythm: Regular rhythm. Tachycardia present.      Heart sounds: Normal heart sounds.   Pulmonary:      Effort: No respiratory distress.      Breath sounds: Normal breath sounds. No wheezing or rales.   Chest:      Chest wall: No tenderness.   Abdominal:      General: There is no distension. There are no signs of injury.      Palpations: Abdomen is soft.      Tenderness: There is no abdominal tenderness. There is no right CVA tenderness, left CVA tenderness, guarding or rebound. Negative signs include Ayala's sign and McBurney's sign.      Hernia: No hernia is present.   Skin:     General: Skin is warm and dry.      Capillary Refill: Capillary refill takes less than 2 seconds.      Coloration: Skin is not cyanotic.   Neurological:      General: No focal deficit present.      Mental Status: He is alert and oriented to person, place, and time.      Cranial Nerves: No cranial nerve deficit.   Psychiatric:          Mood and Affect: Mood normal.         Behavior: Behavior normal.         Procedures           ED Course                                           Medical Decision Making  Hemodynamically stable and afebrile.  Patient has no peritoneal signs but is uncomfortable.  Laboratory evaluation shows no significant changes from prior and his sodium is normal when corrected for his sugar.  CT scan of the abdomen shows signs of an ileus but no obvious obstruction.  Patient says that his symptoms have not been well-controlled at home we will start him on maintenance fluids and admit to CDU for symptomatic    Problems Addressed:  Diarrhea, unspecified type: complicated acute illness or injury  Nausea and vomiting, unspecified vomiting type: complicated acute illness or injury    Amount and/or Complexity of Data Reviewed  Labs: ordered. Decision-making details documented in ED Course.  Radiology: ordered. Decision-making details documented in ED Course.    Risk  Prescription drug management.  Decision regarding hospitalization.        Final diagnoses:   Nausea and vomiting, unspecified vomiting type   Diarrhea, unspecified type       ED Disposition  ED Disposition       ED Disposition   Decision to Admit    Condition   --    Comment   --               No follow-up provider specified.       Medication List      No changes were made to your prescriptions during this visit.

## 2025-05-10 NOTE — PLAN OF CARE
Goal Outcome Evaluation:         VSS, RA, ambulatory, medicated for nausea and abdominal pain. Will continue current POC

## 2025-05-10 NOTE — ED NOTES
Joselito Chowdary III    Nursing Report ED to Floor:  Mental status: AAOx4  Ambulatory status: By self  Oxygen Therapy:  RA  Cardiac Rhythm: Sinus   Admitted from: ER  Safety Concerns:  None  Precautions: None  Social Issues: None  ED Room #:  07    ED Nurse Phone Extension - [phone].      HPI:   Chief Complaint   Patient presents with    Abdominal Pain       Past Medical History:  Past Medical History:   Diagnosis Date    Acromioclavicular separation 04/2018    Same as above problem    Bipolar 1 disorder     Diabetes mellitus     Dislocation, shoulder 04/2018    Same    Diverticulitis     Hyperlipidemia     Hypertension     Knee swelling     On first visit    Mood disorder     Respiratory infection     Rotator cuff syndrome 04/2018    R Shoulder        Past Surgical History:  Past Surgical History:   Procedure Laterality Date    CARDIAC CATHETERIZATION  2012    CARDIAC CATHETERIZATION N/A 07/13/2021    Procedure: Left Heart Cath;  Surgeon: Lois Levy MD;  Location: [facility] CATH INVASIVE LOCATION;  Service: Cardiology;  Laterality: N/A;    CHOLECYSTECTOMY      COLONOSCOPY      GALLBLADDER SURGERY      SHOULDER SURGERY Right     REPAIR     TRIGGER POINT INJECTION      First and subsequent visits        Admitting Doctor:   Matty Llamas MD    Consulting Provider(s):  Consults       No orders found from 4/11/2025 to 5/11/2025.             Admitting Diagnosis:   The primary encounter diagnosis was Nausea and vomiting, unspecified vomiting type. A diagnosis of Diarrhea, unspecified type was also pertinent to this visit.    Most Recent Vitals:   Vitals:    05/10/25 1329 05/10/25 1400 05/10/25 1430 05/10/25 1500   BP: 138/67 145/65 142/58 142/52   BP Location:       Patient Position:       Pulse: 96 96 106 94   Resp:       Temp:       TempSrc:       SpO2: 99% 99% 100% 97%   Weight:       Height:           Active LDAs/IV Access:   Lines, Drains & Airways       Active LDAs       Name Placement date Placement time  Site Days    Peripheral IV 05/10/25 1244 20 G Anterior;Distal;Right;Upper Arm 05/10/25  1244  Arm  less than 1                    Labs (abnormal labs have a star):   Labs Reviewed   COMPREHENSIVE METABOLIC PANEL - Abnormal; Notable for the following components:       Result Value    Glucose 342 (*)     Sodium 132 (*)     Chloride 97 (*)     Calcium 7.3 (*)     Total Protein 5.3 (*)     Albumin 3.0 (*)     ALT (SGPT) 42 (*)     All other components within normal limits    Narrative:     GFR Categories in Chronic Kidney Disease (CKD)              GFR Category          GFR (mL/min/1.73)    Interpretation  G1                    90 or greater        Normal or high (1)  G2                    60-89                Mild decrease (1)  G3a                   45-59                Mild to moderate decrease  G3b                   30-44                Moderate to severe decrease  G4                    15-29                Severe decrease  G5                    14 or less           Kidney failure    (1)In the absence of evidence of kidney disease, neither GFR category G1 or G2 fulfill the criteria for CKD.    eGFR calculation 2021 CKD-EPI creatinine equation, which does not include race as a factor   CBC WITH AUTO DIFFERENTIAL - Abnormal; Notable for the following components:    Lymphocyte % 14.1 (*)     Monocyte % 19.0 (*)     Lymphocytes, Absolute 0.60 (*)     All other components within normal limits   URINALYSIS W/ MICROSCOPIC IF INDICATED (NO CULTURE) - Abnormal; Notable for the following components:    Glucose, UA >=1000 mg/dL (3+) (*)     Ketones, UA 15 mg/dL (1+) (*)     Protein, UA Trace (*)     All other components within normal limits    Narrative:     Urine microscopic not indicated.   LIPASE - Abnormal; Notable for the following components:    Lipase 12 (*)     All other components within normal limits   LACTIC ACID, PLASMA - Normal   BLOOD CULTURE   BLOOD CULTURE   RAINBOW DRAW    Narrative:     The following orders  were created for panel order [place] Draw.  Procedure                               Abnormality         Status                     ---------                               -----------         ------                     Green Top (Gel)[[id number]]                                  Final result               Lavender Top[[id number]]                                     Final result               Gold Top - SST[[id number]]                                   Final result               Leon Top[[id number]]                                         Final result               Light Blue Top[[id number]]                                   Final result                 Please view results for these tests on the individual orders.   MAGNESIUM   CBC AND DIFFERENTIAL    Narrative:     The following orders were created for panel order CBC & Differential.  Procedure                               Abnormality         Status                     ---------                               -----------         ------                     CBC Auto Differential[[id number]]        Abnormal            Final result                 Please view results for these tests on the individual orders.   GREEN TOP   LAVENDER TOP   GOLD TOP - SST   GRAY TOP   LIGHT BLUE TOP       Meds Given in ED:   Medications   sodium chloride 0.9 % flush 10 mL (has no administration in time range)   sodium chloride 0.9 % infusion (has no administration in time range)   sodium chloride 0.9 % flush 10 mL (has no administration in time range)   sodium chloride 0.9 % flush 10 mL (has no administration in time range)   sodium chloride 0.9 % infusion 40 mL (has no administration in time range)   Potassium Replacement - Follow Nurse / BPA Driven Protocol (has no administration in time range)   Magnesium Standard Dose Replacement - Follow Nurse / BPA Driven Protocol (has no administration in time range)   Phosphorus Replacement - Follow Nurse / BPA Driven Protocol (has no administration in  time range)   Calcium Replacement - Follow Nurse / BPA Driven Protocol (has no administration in time range)   acetaminophen (TYLENOL) tablet 650 mg (has no administration in time range)   ondansetron (ZOFRAN) injection 4 mg (has no administration in time range)   scopolamine patch 1 mg/72 hr (has no administration in time range)   sodium chloride 0.9 % bolus 1,000 mL (0 mL Intravenous Stopped 5/10/25 1359)   morphine injection 4 mg (4 mg Intravenous Given 5/10/25 1428)   ondansetron (ZOFRAN) injection 4 mg (4 mg Intravenous Given 5/10/25 1429)   iopamidol (ISOVUE-370) 76 % injection 100 mL (97 mL Intravenous Given 5/10/25 1441)     sodium chloride, 100 mL/hr         Last NIH score:                                                          Dysphagia screening results:        [place] Coma Scale:  No data recorded     CIWA:        Restraint Type:            Isolation Status:  No active isolations

## 2025-05-11 ENCOUNTER — APPOINTMENT (OUTPATIENT)
Dept: CT IMAGING | Facility: HOSPITAL | Age: 65
DRG: 388 | End: 2025-05-11
Payer: COMMERCIAL

## 2025-05-11 ENCOUNTER — HOSPITAL ENCOUNTER (INPATIENT)
Facility: HOSPITAL | Age: 65
LOS: 4 days | Discharge: HOME | DRG: 388 | End: 2025-05-16
Attending: EMERGENCY MEDICINE | Admitting: INTERNAL MEDICINE
Payer: COMMERCIAL

## 2025-05-11 ENCOUNTER — APPOINTMENT (OUTPATIENT)
Dept: GENERAL RADIOLOGY | Facility: HOSPITAL | Age: 65
DRG: 388 | End: 2025-05-11
Payer: COMMERCIAL

## 2025-05-11 VITALS
SYSTOLIC BLOOD PRESSURE: 126 MMHG | BODY MASS INDEX: 42.23 KG/M2 | RESPIRATION RATE: 22 BRPM | OXYGEN SATURATION: 97 % | HEART RATE: 116 BPM | DIASTOLIC BLOOD PRESSURE: 91 MMHG | WEIGHT: 295 LBS | HEIGHT: 70 IN | TEMPERATURE: 97.7 F

## 2025-05-11 DIAGNOSIS — E87.29 INCREASED ANION GAP METABOLIC ACIDOSIS: ICD-10-CM

## 2025-05-11 DIAGNOSIS — N28.9 ACUTE RENAL INSUFFICIENCY: ICD-10-CM

## 2025-05-11 DIAGNOSIS — R11.2 INTRACTABLE NAUSEA AND VOMITING: Primary | ICD-10-CM

## 2025-05-11 DIAGNOSIS — J18.9 PNEUMONIA OF LEFT LOWER LOBE DUE TO INFECTIOUS ORGANISM: ICD-10-CM

## 2025-05-11 DIAGNOSIS — K56.7 ILEUS: ICD-10-CM

## 2025-05-11 LAB
ALBUMIN SERPL-MCNC: 2.9 G/DL (ref 3.5–5.2)
ALBUMIN SERPL-MCNC: 3 G/DL (ref 3.5–5.2)
ALBUMIN/GLOB SERPL: 1.1 G/DL
ALBUMIN/GLOB SERPL: 1.3 G/DL
ALP SERPL-CCNC: 54 U/L (ref 39–117)
ALP SERPL-CCNC: 56 U/L (ref 39–117)
ALT SERPL W P-5'-P-CCNC: 26 U/L (ref 1–41)
ALT SERPL W P-5'-P-CCNC: 33 U/L (ref 1–41)
ANION GAP SERPL CALCULATED.3IONS-SCNC: 14 MMOL/L (ref 5–15)
ANION GAP SERPL CALCULATED.3IONS-SCNC: 19 MMOL/L (ref 5–15)
ANISOCYTOSIS BLD QL: ABNORMAL
ANISOCYTOSIS BLD QL: NORMAL
ARTERIAL PATENCY WRIST A: ABNORMAL
AST SERPL-CCNC: 11 U/L (ref 1–40)
AST SERPL-CCNC: 15 U/L (ref 1–40)
ATMOSPHERIC PRESS: ABNORMAL MM[HG]
B PARAPERT DNA SPEC QL NAA+PROBE: NOT DETECTED
B PERT DNA SPEC QL NAA+PROBE: NOT DETECTED
BACTERIA UR QL AUTO: ABNORMAL /HPF
BASE EXCESS BLDA CALC-SCNC: -4.6 MMOL/L (ref 0–2)
BASOPHILS # BLD MANUAL: 0 10*3/MM3 (ref 0–0.2)
BASOPHILS NFR BLD MANUAL: 0 % (ref 0–1.5)
BDY SITE: ABNORMAL
BILIRUB SERPL-MCNC: 1 MG/DL (ref 0–1.2)
BILIRUB SERPL-MCNC: 1 MG/DL (ref 0–1.2)
BILIRUB UR QL STRIP: ABNORMAL
BODY TEMPERATURE: 37
BUN SERPL-MCNC: 16 MG/DL (ref 8–23)
BUN SERPL-MCNC: 24 MG/DL (ref 8–23)
BUN/CREAT SERPL: 15.7 (ref 7–25)
BUN/CREAT SERPL: 18.5 (ref 7–25)
C PNEUM DNA NPH QL NAA+NON-PROBE: NOT DETECTED
CALCIUM SPEC-SCNC: 7.6 MG/DL (ref 8.6–10.5)
CALCIUM SPEC-SCNC: 7.7 MG/DL (ref 8.6–10.5)
CHLORIDE SERPL-SCNC: 93 MMOL/L (ref 98–107)
CHLORIDE SERPL-SCNC: 97 MMOL/L (ref 98–107)
CK SERPL-CCNC: 111 U/L (ref 20–200)
CLARITY UR: CLEAR
CO2 BLDA-SCNC: 20.1 MMOL/L (ref 22–33)
CO2 SERPL-SCNC: 18 MMOL/L (ref 22–29)
CO2 SERPL-SCNC: 21 MMOL/L (ref 22–29)
COHGB MFR BLD: 1.3 % (ref 0–2)
COLOR UR: ABNORMAL
CREAT SERPL-MCNC: 1.02 MG/DL (ref 0.76–1.27)
CREAT SERPL-MCNC: 1.3 MG/DL (ref 0.76–1.27)
D-LACTATE SERPL-SCNC: 2 MMOL/L (ref 0.5–2)
DEPRECATED RDW RBC AUTO: 41.6 FL (ref 37–54)
DEPRECATED RDW RBC AUTO: 42 FL (ref 37–54)
EGFRCR SERPLBLD CKD-EPI 2021: 61.7 ML/MIN/1.73
EGFRCR SERPLBLD CKD-EPI 2021: 82.6 ML/MIN/1.73
ELLIPTOCYTES BLD QL SMEAR: ABNORMAL
ELLIPTOCYTES BLD QL SMEAR: NORMAL
EOSINOPHIL # BLD MANUAL: 0 10*3/MM3 (ref 0–0.4)
EOSINOPHIL # BLD MANUAL: 0.05 10*3/MM3 (ref 0–0.4)
EOSINOPHIL NFR BLD MANUAL: 0 % (ref 0.3–6.2)
EOSINOPHIL NFR BLD MANUAL: 1.1 % (ref 0.3–6.2)
EPAP: 0
ERYTHROCYTE [DISTWIDTH] IN BLOOD BY AUTOMATED COUNT: 13.2 % (ref 12.3–15.4)
ERYTHROCYTE [DISTWIDTH] IN BLOOD BY AUTOMATED COUNT: 13.4 % (ref 12.3–15.4)
FLUAV SUBTYP SPEC NAA+PROBE: NOT DETECTED
FLUBV RNA ISLT QL NAA+PROBE: NOT DETECTED
GEN 5 1HR TROPONIN T REFLEX: 17 NG/L
GLOBULIN UR ELPH-MCNC: 2.4 GM/DL
GLOBULIN UR ELPH-MCNC: 2.6 GM/DL
GLUCOSE BLDC GLUCOMTR-MCNC: 237 MG/DL (ref 70–130)
GLUCOSE SERPL-MCNC: 270 MG/DL (ref 65–99)
GLUCOSE SERPL-MCNC: 375 MG/DL (ref 65–99)
GLUCOSE UR STRIP-MCNC: ABNORMAL MG/DL
GRAN CASTS URNS QL MICRO: ABNORMAL /LPF
HADV DNA SPEC NAA+PROBE: NOT DETECTED
HBA1C MFR BLD: 6.69 % (ref 4.8–5.6)
HCO3 BLDA-SCNC: 19.1 MMOL/L (ref 20–26)
HCOV 229E RNA SPEC QL NAA+PROBE: NOT DETECTED
HCOV HKU1 RNA SPEC QL NAA+PROBE: NOT DETECTED
HCOV NL63 RNA SPEC QL NAA+PROBE: NOT DETECTED
HCOV OC43 RNA SPEC QL NAA+PROBE: NOT DETECTED
HCT VFR BLD AUTO: 41.9 % (ref 37.5–51)
HCT VFR BLD AUTO: 42.8 % (ref 37.5–51)
HCT VFR BLD CALC: 44.4 % (ref 38–51)
HGB BLD-MCNC: 14.5 G/DL (ref 13–17.7)
HGB BLD-MCNC: 14.9 G/DL (ref 13–17.7)
HGB BLDA-MCNC: 14.5 G/DL (ref 13.5–17.5)
HGB UR QL STRIP.AUTO: NEGATIVE
HMPV RNA NPH QL NAA+NON-PROBE: NOT DETECTED
HOLD SPECIMEN: NORMAL
HPIV1 RNA ISLT QL NAA+PROBE: NOT DETECTED
HPIV2 RNA SPEC QL NAA+PROBE: NOT DETECTED
HPIV3 RNA NPH QL NAA+PROBE: NOT DETECTED
HPIV4 P GENE NPH QL NAA+PROBE: NOT DETECTED
HYALINE CASTS UR QL AUTO: ABNORMAL /LPF
INHALED O2 CONCENTRATION: 21 %
IPAP: 0
KETONES UR QL STRIP: ABNORMAL
LEUKOCYTE ESTERASE UR QL STRIP.AUTO: NEGATIVE
LIPASE SERPL-CCNC: 24 U/L (ref 13–60)
LYMPHOCYTES # BLD MANUAL: 0.74 10*3/MM3 (ref 0.7–3.1)
LYMPHOCYTES # BLD MANUAL: 1 10*3/MM3 (ref 0.7–3.1)
LYMPHOCYTES NFR BLD MANUAL: 21 % (ref 5–12)
LYMPHOCYTES NFR BLD MANUAL: 24.2 % (ref 5–12)
M PNEUMO IGG SER IA-ACNC: NOT DETECTED
MCH RBC QN AUTO: 29.4 PG (ref 26.6–33)
MCH RBC QN AUTO: 29.7 PG (ref 26.6–33)
MCHC RBC AUTO-ENTMCNC: 34.6 G/DL (ref 31.5–35.7)
MCHC RBC AUTO-ENTMCNC: 34.8 G/DL (ref 31.5–35.7)
MCV RBC AUTO: 85 FL (ref 79–97)
MCV RBC AUTO: 85.3 FL (ref 79–97)
METHGB BLD QL: 0.4 % (ref 0–1.5)
MODALITY: ABNORMAL
MONOCYTES # BLD: 0.92 10*3/MM3 (ref 0.1–0.9)
MONOCYTES # BLD: 1 10*3/MM3 (ref 0.1–0.9)
NEUTROPHILS # BLD AUTO: 2.1 10*3/MM3 (ref 1.7–7)
NEUTROPHILS # BLD AUTO: 2.72 10*3/MM3 (ref 1.7–7)
NEUTROPHILS NFR BLD MANUAL: 27 % (ref 42.7–76)
NEUTROPHILS NFR BLD MANUAL: 50.5 % (ref 42.7–76)
NEUTS BAND NFR BLD MANUAL: 35 % (ref 0–5)
NITRITE UR QL STRIP: NEGATIVE
NRBC SPEC MANUAL: 0 /100 WBC (ref 0–0.2)
OXYHGB MFR BLDV: 94.6 % (ref 94–99)
PAW @ PEAK INSP FLOW SETTING VENT: 0 CMH2O
PCO2 BLDA: 31.2 MM HG (ref 35–45)
PCO2 TEMP ADJ BLD: 31.2 MM HG (ref 35–48)
PH BLDA: 7.4 PH UNITS (ref 7.35–7.45)
PH UR STRIP.AUTO: 5.5 [PH] (ref 5–8)
PH, TEMP CORRECTED: 7.4 PH UNITS
PLAT MORPH BLD: NORMAL
PLATELET # BLD AUTO: 243 10*3/MM3 (ref 140–450)
PLATELET # BLD AUTO: 288 10*3/MM3 (ref 140–450)
PMV BLD AUTO: 9.1 FL (ref 6–12)
PMV BLD AUTO: 9.4 FL (ref 6–12)
PO2 BLDA: 72.5 MM HG (ref 83–108)
PO2 TEMP ADJ BLD: 72.5 MM HG (ref 83–108)
POLYCHROMASIA BLD QL SMEAR: ABNORMAL
POLYCHROMASIA BLD QL SMEAR: NORMAL
POTASSIUM SERPL-SCNC: 3.5 MMOL/L (ref 3.5–5.2)
POTASSIUM SERPL-SCNC: 3.8 MMOL/L (ref 3.5–5.2)
PROT SERPL-MCNC: 5.4 G/DL (ref 6–8.5)
PROT SERPL-MCNC: 5.5 G/DL (ref 6–8.5)
PROT UR QL STRIP: ABNORMAL
RBC # BLD AUTO: 4.93 10*6/MM3 (ref 4.14–5.8)
RBC # BLD AUTO: 5.02 10*6/MM3 (ref 4.14–5.8)
RBC # UR STRIP: ABNORMAL /HPF
RBC MORPH BLD: NORMAL
REF LAB TEST METHOD: ABNORMAL
RHINOVIRUS RNA SPEC NAA+PROBE: NOT DETECTED
RSV RNA NPH QL NAA+NON-PROBE: NOT DETECTED
SARS-COV-2 RNA NPH QL NAA+NON-PROBE: NOT DETECTED
SCAN SLIDE: NORMAL
SMALL PLATELETS BLD QL SMEAR: ADEQUATE
SMALL PLATELETS BLD QL SMEAR: ADEQUATE
SODIUM SERPL-SCNC: 130 MMOL/L (ref 136–145)
SODIUM SERPL-SCNC: 132 MMOL/L (ref 136–145)
SP GR UR STRIP: 1.03 (ref 1–1.03)
SQUAMOUS #/AREA URNS HPF: ABNORMAL /HPF
TOTAL RATE: 0 BREATHS/MINUTE
TRANS CELLS #/AREA URNS HPF: ABNORMAL /HPF
TROPONIN T % DELTA: -35
TROPONIN T NUMERIC DELTA: -9 NG/L
TROPONIN T SERPL HS-MCNC: 26 NG/L
UROBILINOGEN UR QL STRIP: ABNORMAL
VARIANT LYMPHS NFR BLD MANUAL: 17 % (ref 19.6–45.3)
VARIANT LYMPHS NFR BLD MANUAL: 18.9 % (ref 19.6–45.3)
VARIANT LYMPHS NFR BLD MANUAL: 5.3 % (ref 0–5)
WBC # UR STRIP: ABNORMAL /HPF
WBC MORPH BLD: NORMAL
WBC MORPH BLD: NORMAL
WBC NRBC COR # BLD AUTO: 4.15 10*3/MM3 (ref 3.4–10.8)
WBC NRBC COR # BLD AUTO: 4.38 10*3/MM3 (ref 3.4–10.8)
WHOLE BLOOD HOLD COAG: NORMAL
WHOLE BLOOD HOLD SPECIMEN: NORMAL

## 2025-05-11 PROCEDURE — 96375 TX/PRO/DX INJ NEW DRUG ADDON: CPT

## 2025-05-11 PROCEDURE — 25010000002 MORPHINE PER 10 MG: Performed by: EMERGENCY MEDICINE

## 2025-05-11 PROCEDURE — 85007 BL SMEAR W/DIFF WBC COUNT: CPT | Performed by: NURSE PRACTITIONER

## 2025-05-11 PROCEDURE — 0202U NFCT DS 22 TRGT SARS-COV-2: CPT | Performed by: EMERGENCY MEDICINE

## 2025-05-11 PROCEDURE — 36415 COLL VENOUS BLD VENIPUNCTURE: CPT

## 2025-05-11 PROCEDURE — 83036 HEMOGLOBIN GLYCOSYLATED A1C: CPT | Performed by: NURSE PRACTITIONER

## 2025-05-11 PROCEDURE — 96376 TX/PRO/DX INJ SAME DRUG ADON: CPT

## 2025-05-11 PROCEDURE — 25810000003 SODIUM CHLORIDE 0.9 % SOLUTION: Performed by: EMERGENCY MEDICINE

## 2025-05-11 PROCEDURE — 25010000002 ONDANSETRON PER 1 MG: Performed by: NURSE PRACTITIONER

## 2025-05-11 PROCEDURE — 36600 WITHDRAWAL OF ARTERIAL BLOOD: CPT

## 2025-05-11 PROCEDURE — 85025 COMPLETE CBC W/AUTO DIFF WBC: CPT | Performed by: NURSE PRACTITIONER

## 2025-05-11 PROCEDURE — 25510000001 IOPAMIDOL 61 % SOLUTION: Performed by: EMERGENCY MEDICINE

## 2025-05-11 PROCEDURE — 81001 URINALYSIS AUTO W/SCOPE: CPT | Performed by: EMERGENCY MEDICINE

## 2025-05-11 PROCEDURE — 80053 COMPREHEN METABOLIC PANEL: CPT | Performed by: EMERGENCY MEDICINE

## 2025-05-11 PROCEDURE — G0378 HOSPITAL OBSERVATION PER HR: HCPCS

## 2025-05-11 PROCEDURE — 63710000001 INSULIN REGULAR HUMAN PER 5 UNITS: Performed by: NURSE PRACTITIONER

## 2025-05-11 PROCEDURE — 83050 HGB METHEMOGLOBIN QUAN: CPT

## 2025-05-11 PROCEDURE — 25010000002 ONDANSETRON PER 1 MG: Performed by: EMERGENCY MEDICINE

## 2025-05-11 PROCEDURE — 85025 COMPLETE CBC W/AUTO DIFF WBC: CPT | Performed by: EMERGENCY MEDICINE

## 2025-05-11 PROCEDURE — 71045 X-RAY EXAM CHEST 1 VIEW: CPT

## 2025-05-11 PROCEDURE — 82375 ASSAY CARBOXYHB QUANT: CPT

## 2025-05-11 PROCEDURE — 83605 ASSAY OF LACTIC ACID: CPT | Performed by: EMERGENCY MEDICINE

## 2025-05-11 PROCEDURE — 63710000001 PROMETHAZINE PER 25 MG

## 2025-05-11 PROCEDURE — 74177 CT ABD & PELVIS W/CONTRAST: CPT

## 2025-05-11 PROCEDURE — 82805 BLOOD GASES W/O2 SATURATION: CPT

## 2025-05-11 PROCEDURE — 84484 ASSAY OF TROPONIN QUANT: CPT | Performed by: EMERGENCY MEDICINE

## 2025-05-11 PROCEDURE — 82550 ASSAY OF CK (CPK): CPT | Performed by: EMERGENCY MEDICINE

## 2025-05-11 PROCEDURE — 85007 BL SMEAR W/DIFF WBC COUNT: CPT | Performed by: EMERGENCY MEDICINE

## 2025-05-11 PROCEDURE — 82948 REAGENT STRIP/BLOOD GLUCOSE: CPT | Performed by: NURSE PRACTITIONER

## 2025-05-11 PROCEDURE — 83690 ASSAY OF LIPASE: CPT | Performed by: EMERGENCY MEDICINE

## 2025-05-11 PROCEDURE — 99285 EMERGENCY DEPT VISIT HI MDM: CPT

## 2025-05-11 PROCEDURE — 80053 COMPREHEN METABOLIC PANEL: CPT | Performed by: NURSE PRACTITIONER

## 2025-05-11 RX ORDER — ONDANSETRON 8 MG/1
8 TABLET, ORALLY DISINTEGRATING ORAL EVERY 8 HOURS PRN
Qty: 20 TABLET | Refills: 0 | Status: SHIPPED | OUTPATIENT
Start: 2025-05-11 | End: 2025-05-16 | Stop reason: HOSPADM

## 2025-05-11 RX ORDER — SODIUM CHLORIDE 9 MG/ML
10 INJECTION, SOLUTION INTRAMUSCULAR; INTRAVENOUS; SUBCUTANEOUS AS NEEDED
Status: DISCONTINUED | OUTPATIENT
Start: 2025-05-11 | End: 2025-05-16 | Stop reason: HOSPADM

## 2025-05-11 RX ORDER — PROMETHAZINE HYDROCHLORIDE 25 MG/1
25 TABLET ORAL ONCE
Status: COMPLETED | OUTPATIENT
Start: 2025-05-11 | End: 2025-05-11

## 2025-05-11 RX ORDER — IOPAMIDOL 612 MG/ML
100 INJECTION, SOLUTION INTRAVASCULAR
Status: COMPLETED | OUTPATIENT
Start: 2025-05-11 | End: 2025-05-11

## 2025-05-11 RX ORDER — ONDANSETRON 2 MG/ML
4 INJECTION INTRAMUSCULAR; INTRAVENOUS ONCE
Status: COMPLETED | OUTPATIENT
Start: 2025-05-11 | End: 2025-05-11

## 2025-05-11 RX ORDER — PANTOPRAZOLE SODIUM 40 MG/10ML
40 INJECTION, POWDER, LYOPHILIZED, FOR SOLUTION INTRAVENOUS ONCE
Status: COMPLETED | OUTPATIENT
Start: 2025-05-11 | End: 2025-05-11

## 2025-05-11 RX ORDER — SODIUM CHLORIDE 0.9 % (FLUSH) 0.9 %
10 SYRINGE (ML) INJECTION AS NEEDED
Status: DISCONTINUED | OUTPATIENT
Start: 2025-05-11 | End: 2025-05-16 | Stop reason: HOSPADM

## 2025-05-11 RX ADMIN — HYDROCHLOROTHIAZIDE 25 MG: 25 TABLET ORAL at 10:04

## 2025-05-11 RX ADMIN — MORPHINE SULFATE 2 MG: 2 INJECTION, SOLUTION INTRAMUSCULAR; INTRAVENOUS at 00:24

## 2025-05-11 RX ADMIN — HUMAN INSULIN 6 UNITS: 100 INJECTION, SOLUTION SUBCUTANEOUS at 00:18

## 2025-05-11 RX ADMIN — ONDANSETRON 4 MG: 2 INJECTION INTRAMUSCULAR; INTRAVENOUS at 22:00

## 2025-05-11 RX ADMIN — PROMETHAZINE HYDROCHLORIDE 25 MG: 25 TABLET ORAL at 05:36

## 2025-05-11 RX ADMIN — IOPAMIDOL 85 ML: 612 INJECTION, SOLUTION INTRAVENOUS at 22:47

## 2025-05-11 RX ADMIN — LOSARTAN POTASSIUM 100 MG: 50 TABLET, FILM COATED ORAL at 10:04

## 2025-05-11 RX ADMIN — ONDANSETRON 4 MG: 2 INJECTION INTRAMUSCULAR; INTRAVENOUS at 03:21

## 2025-05-11 RX ADMIN — SODIUM CHLORIDE 1000 ML: 9 INJECTION, SOLUTION INTRAVENOUS at 22:00

## 2025-05-11 RX ADMIN — LAMOTRIGINE 150 MG: 100 TABLET ORAL at 10:04

## 2025-05-11 RX ADMIN — FLUOXETINE HYDROCHLORIDE 20 MG: 20 CAPSULE ORAL at 10:04

## 2025-05-11 RX ADMIN — HUMAN INSULIN 6 UNITS: 100 INJECTION, SOLUTION SUBCUTANEOUS at 06:16

## 2025-05-11 RX ADMIN — Medication 10 ML: at 09:18

## 2025-05-11 RX ADMIN — PANTOPRAZOLE SODIUM 40 MG: 40 INJECTION, POWDER, FOR SOLUTION INTRAVENOUS at 09:17

## 2025-05-11 NOTE — PROGRESS NOTES
[facility]     Progress Note    Patient Name: Joselito Chowdary III  : 1960  MRN: [id number]  Primary Care Physician:  Melissa Guerra APRN  Date of admission: 5/10/2025    Subjective   Subjective     Chief Complaint:     Abdominal Pain      Review of Systems   Gastrointestinal:  Positive for abdominal pain.       Objective   Objective     Vitals:   Temp:  [98.9 °F (37.2 °C)-101.8 °F (38.8 °C)] 98.9 °F (37.2 °C)  Heart Rate:  [] 98  Resp:  [18-22] 18  BP: (121-156)/(51-77) 126/65    Physical Exam   Vitals and nursing note reviewed. Exam conducted with a chaperone present.   Constitutional:       Appearance: Normal appearance. He is obese.   HENT:      Head: Normocephalic and atraumatic.   Cardiovascular:      Rate and Rhythm: Normal rate and regular rhythm.   Pulmonary:      Effort: Pulmonary effort is normal.      Breath sounds: Normal breath sounds.   Abdominal:      General: Bowel sounds are normal.      Palpations: Abdomen is soft.      Tenderness: There is abdominal tenderness.   Musculoskeletal:         General: Normal range of motion.   Skin:     General: Skin is warm and dry.   Neurological:      General: No focal deficit present.      Mental Status: He is alert and oriented to person, place, and time.   Psychiatric:         Mood and Affect: Mood normal.         Behavior: Behavior normal.   Result Review    Result Review:  I have personally reviewed the results from the time of this admission to 2025 05:28 EDT and agree with these findings:  [x]  Laboratory list / accordion  []  Microbiology  [x]  Radiology  []  EKG/Telemetry   []  Cardiology/Vascular   []  Pathology  []  Old records  []  Other:      Assessment & Plan   Assessment / Plan     Brief Patient Summary:  Joselito Chowdary III is a 65 y.o. male who  presents to the emergency department for abdominal pain was seen here several days ago for similar abdominal pain has not gotten any better he continues to have nausea and  vomiting which somewhat improved though not resolved as well as diarrhea. Vomit and stool are both nonbloody. He denies any alleviating or exacerbating factors. Says the pain is sharp in nature located on the middle of his abdomen has not radiated or moved. He denies any known fever. Patient to be admitted to CDU for continued observation and medical management for intractable nausea and vomiting secondary to ileus.  Patient was monitored through the night without significant change.  No repeat vomiting however patient have some nausea early in the morning which has been treated with Zofran and Phenergan.  Patient to continue receiving IV fluids and nausea management and pain management as needed.    Active Hospital Problems:  Active Hospital Problems    Diagnosis     **Ileus      Plan:   CTA abdomen pelvis: findings suggestive of ileus. This has worsened/progressed compared to prior CT  -PRN IV antiemetics and analgesia  -NS @ 100 cc/hr  -NPO x sips    VTE Prophylaxis:  Mechanical VTE prophylaxis orders are present.        CODE STATUS:    Code Status (Patient has no pulse and is not breathing): CPR (Attempt to Resuscitate)  Medical Interventions (Patient has pulse or is breathing): Full Support  Level Of Support Discussed With: Patient    Disposition:  I expect patient to be discharged today.    Neptali Keita PA-C

## 2025-05-11 NOTE — PLAN OF CARE
Problem: Adult Inpatient Plan of Care  Goal: Plan of Care Review  Outcome: Progressing  Goal: Patient-Specific Goal (Individualized)  Outcome: Progressing  Goal: Absence of Hospital-Acquired Illness or Injury  Outcome: Progressing  Intervention: Identify and Manage Fall Risk  Recent Flowsheet Documentation  Taken 5/11/2025 0730 by Awilda Montemayor RN  Safety Promotion/Fall Prevention:   activity supervised   assistive device/personal items within reach  Intervention: Prevent Skin Injury  Recent Flowsheet Documentation  Taken 5/11/2025 0730 by Awilda Montemayor RN  Body Position: position changed independently  Goal: Optimal Comfort and Wellbeing  Outcome: Progressing  Goal: Readiness for Transition of Care  Outcome: Progressing  Goal: Plan of Care Review  Outcome: Progressing  Goal: Absence of Hospital-Acquired Illness or Injury  Outcome: Progressing  Intervention: Identify and Manage Fall Risk  Recent Flowsheet Documentation  Taken 5/11/2025 0730 by Awilda Montemayor RN  Safety Promotion/Fall Prevention:   activity supervised   assistive device/personal items within reach  Intervention: Prevent Skin Injury  Recent Flowsheet Documentation  Taken 5/11/2025 0730 by Awilda Montemayor RN  Body Position: position changed independently  Goal: Optimal Comfort and Wellbeing  Outcome: Progressing  Goal: Readiness for Transition of Care  Outcome: Progressing     Problem: Fluid Volume Deficit  Goal: Fluid Balance  Outcome: Progressing     Problem: Nausea and Vomiting  Goal: Nausea and Vomiting Relief  Outcome: Progressing     Problem: Diarrhea  Goal: Effective Diarrhea Management  Outcome: Progressing     Problem: Sepsis/Septic Shock  Goal: Optimal Coping  Outcome: Progressing  Intervention: Support Patient and Family Response  Recent Flowsheet Documentation  Taken 5/11/2025 0730 by Awilda Montemayor RN  Supportive Measures: self-care encouraged  Goal: Absence of Bleeding  Outcome: Progressing  Goal: Blood Glucose Level  Within Target Range  Outcome: Progressing  Goal: Absence of Infection Signs and Symptoms  Outcome: Progressing  Intervention: Promote Recovery  Recent Flowsheet Documentation  Taken 5/11/2025 5420 by Awilda Montemayor, RN  Activity Management: up ad abdi  Goal: Optimal Nutrition Delivery  Outcome: Progressing   Goal Outcome Evaluation:

## 2025-05-11 NOTE — DISCHARGE SUMMARY
[facility] CDU  DISCHARGE SUMMARY      Patient Name: Joselito Chowdary III  : 1960  MRN: [id number]    Date of Admission: 5/10/2025  Date of Discharge:  25   Primary Care Physician: Melissa Guerra APRN      Presenting Problem:   Ileus [K56.7]    Active and Resolved Hospital Problems:  Active Hospital Problems    Diagnosis POA    **Ileus [K56.7] Yes      Resolved Hospital Problems   No resolved problems to display.         Hospital Course:     Plan:      #Ileus  #Intractable nausea/vomiting  -CTA abdomen pelvis: findings suggestive of ileus. This has worsened/progressed compared to prior CT  -PRN IV antiemetics and analgesia  -NS @ 100 cc/hr  -NPO x sips     #HTN  -Continue home Hyzaar  -PRN IV Hydralazine for SBP > 160  -Cardiac monitoring     #HLD  -Continue home Atorvastatin     #T2DM  -FSBG q6 hours, SSI  -Hypoglycemia protocol  -Hold home Metformin, Jardiance     #GERD  -Continue home Protonix     #Depression with anxiety  #Bipolar disorder  -Continue home Wellbutrin, Prozac  -Continue home Lamictal     #Morbidly obese  -BMI 42.33  -May complicate all aspects of care     #PPX  -SCDs  -Protonix    Patient states he feels better this AM, is able to tolerate clears without nausea / vomiting.  Advised patient to stick with a clear liquid diet for 24-48 hours until ileus resolves and advance diet as tolerated.  Patient acknowledged understanding.    Nurses Notes reviewed and agree, including vitals, allergies, social history and prior medical history.     REVIEW OF SYSTEMS: All systems reviewed and not pertinent unless noted.  Review of Systems   Constitutional:  Positive for activity change and fatigue.   HENT: Negative.     Respiratory: Negative.     Cardiovascular: Negative.    Gastrointestinal: Negative.    Endocrine: Negative.    Musculoskeletal: Negative.    Skin: Negative.    Neurological: Negative.    Psychiatric/Behavioral: Negative.         Past Medical History:   Diagnosis  "Date    Acromioclavicular separation 2018    Same as above problem    Bipolar 1 disorder     Diabetes mellitus     Dislocation, shoulder 2018    Same    Diverticulitis     Hyperlipidemia     Hypertension     Knee swelling     On first visit    Mood disorder     Respiratory infection     Rotator cuff syndrome 2018    R Shoulder       Allergies:    Atorvastatin and Zestoretic [lisinopril-hydrochlorothiazide]      Past Surgical History:   Procedure Laterality Date    CARDIAC CATHETERIZATION      CARDIAC CATHETERIZATION N/A 2021    Procedure: Left Heart Cath;  Surgeon: Lois Levy MD;  Location: [facility] CATH INVASIVE LOCATION;  Service: Cardiology;  Laterality: N/A;    CHOLECYSTECTOMY      COLONOSCOPY      GALLBLADDER SURGERY      SHOULDER SURGERY Right     REPAIR     TRIGGER POINT INJECTION      First and subsequent visits         Social History     Socioeconomic History    Marital status:    Tobacco Use    Smoking status: Former     Current packs/day: 0.00     Average packs/day: 1 pack/day for 10.0 years (10.0 ttl pk-yrs)     Types: Cigarettes     Start date: 1978     Quit date: 1986     Years since quittin.3    Smokeless tobacco: Never   Vaping Use    Vaping status: Never Used   Substance and Sexual Activity    Alcohol use: Not Currently     Comment: Occasional drink    Drug use: Never    Sexual activity: Not Currently     Partners: Female     Birth control/protection: None         Family History   Problem Relation Age of Onset    Cancer Mother     Hypertension Mother     Deep vein thrombosis Mother     Diabetes Father     Heart disease Father     Hypertension Father     COPD Father     Heart failure Father        Objective  Physical Exam:  /58 (BP Location: Left arm, Patient Position: Lying)   Pulse 95   Temp 97.7 °F (36.5 °C) (Axillary)   Resp 22   Ht 177.8 cm (70\")   Wt 134 kg (295 lb)   SpO2 97%   BMI 42.33 kg/m²      Physical Exam  Vitals and nursing " note reviewed.   Constitutional:       Appearance: Normal appearance. He is obese.   HENT:      Head: Normocephalic and atraumatic.   Cardiovascular:      Rate and Rhythm: Regular rhythm. Tachycardia present.   Pulmonary:      Effort: Pulmonary effort is normal.      Breath sounds: Normal breath sounds.   Abdominal:      Palpations: Abdomen is soft.      Tenderness: There is abdominal tenderness.   Musculoskeletal:         General: Normal range of motion.   Skin:     General: Skin is warm and dry.   Neurological:      General: No focal deficit present.      Mental Status: He is alert and oriented to person, place, and time.   Psychiatric:         Mood and Affect: Mood normal.         Behavior: Behavior normal.         Procedures    CT Angiogram Abdomen Pelvis  Result Date: 5/10/2025  CT ANGIOGRAM ABDOMEN PELVIS Date of Exam: 5/10/2025 2:15 PM EDT Indication: ABD pain. Comparison: CT abdomen and pelvis 5/8/2025 Technique: CTA of the abdomen and pelvis was performed after the uneventful intravenous administration of 100 mL Isovue-370. Reconstructed coronal and sagittal images were also obtained. In addition, a 3-D volume rendered image was created for interpretation. Automated exposure control and iterative reconstruction methods were used. Findings: Unremarkable appearance of the lower thorax. Unremarkable appearance of the liver. The gallbladder is surgically absent. Normal appearance of the bile ducts. Normal size and appearance of the spleen. Unremarkable appearance of the pancreas. Normal appearance of the adrenal glands. Unremarkable appearance of the kidneys, ureters, bladder, prostate, and seminal vesicles. Normal appearance of the arterial vasculature. The vast majority of the small bowel demonstrate fluid and gas distention and dilatation, measuring up to 4.8 cm in diameter. There is no discrete transition point. There is fluid and unformed stool throughout the colon. There is gas distention of the right  "colon and transverse colon. No evidence of bowel ischemia or perforation. No abdominopelvic free fluid or fat stranding. No pneumoperitoneum. Unremarkable appearance of the body wall soft tissues. No lymphadenopathy. No acute or suspicious bony findings.     Impression: Impression: Fluid and gas dilatation and distention of the majority of the small bowel without evidence of discrete transition point. There is gaseous distention and fluid in the colon. The findings are suggestive of ileus. This has worsened/progressed compared to the prior CT. No evidence of bowel ischemia or perforation. Normal appearance of the arterial vasculature/aorta, unchanged from 2 days prior. Electronically Signed: Renato Rider MD  5/10/2025 3:30 PM EDT  Workstation ID: [id number]           Lab 05/10/25  1242 05/08/25  08   LACTATE 2.0 1.7       No results found for: \"SITE\", \"ALLENTEST\", \"PHART\", \"PSU4LEN\", \"PO2ART\", \"WZD2QDT\", \"BASEEXCESS\", \"J9EYGCVW\", \"HGBBG\", \"HCTABG\", \"OXYHEMOGLOBI\", \"METHHGBN\", \"CARBOXYHGB\", \"CO2CT\", \"BAROMETRIC\", \"MODALITY\", \"FIO2\"    Results from last 7 days   Lab Units 05/08/25  1016 05/08/25  0844   HSTROP T ng/L 15 16       Results from last 7 days   Lab Units 05/11/25  0511 05/10/25  1242 05/08/25  0844   WBC 10*3/mm3 4.15 4.27 9.05   HEMOGLOBIN g/dL 14.5 16.4 17.5   HEMATOCRIT % 41.9 47.8 51.5*   PLATELETS 10*3/mm3 243 248 245   MONOCYTES % % 24.2*  --   --    EOSINOPHIL % % 1.1  --   --        Results from last 7 days   Lab Units 05/11/25  0511 05/10/25  1320 05/08/25  0844   SODIUM mmol/L 132* 132* 135*   POTASSIUM mmol/L 3.5 3.6 3.4*   CHLORIDE mmol/L 97* 97* 99   CO2 mmol/L 21.0* 22.6 22.2   BUN mg/dL 16 12 18   CREATININE mg/dL 1.02 1.05 1.01   CALCIUM mg/dL 7.6* 7.3* 8.8   BILIRUBIN mg/dL 1.0 1.0 0.6   ALK PHOS U/L 54 56 88   ALT (SGPT) U/L 33 42* 50*   AST (SGOT) U/L 15 35 71*   GLUCOSE mg/dL 270* 342* 239*       Lab Results   Component Value Date    CHOL 153 07/13/2021    TRIG 200 (H) 07/13/2021    " "HDL 39 (L) 07/13/2021    LDL 80 07/13/2021               No results found for: \"URINECX\"    @[email or web address]@    ED Disposition       ED Disposition   Decision to Admit    Condition   --    Comment   --                    Discharge Medication List:      Your medication list        CHANGE how you take these medications        Instructions Last Dose Given Next Dose Due   ondansetron ODT 8 MG disintegrating tablet  Commonly known as: ZOFRAN-ODT  What changed: Another medication with the same name was added. Make sure you understand how and when to take each.      As Needed.       ondansetron ODT 8 MG disintegrating tablet  Commonly known as: ZOFRAN-ODT  What changed: You were already taking a medication with the same name, and this prescription was added. Make sure you understand how and when to take each.      Place 1 tablet on the tongue Every 8 (Eight) Hours As Needed for Nausea or Vomiting.              CONTINUE taking these medications        Instructions Last Dose Given Next Dose Due   Accu-Chek Guide Test test strip  Generic drug: glucose blood      Use As Directed 3 times daily       albuterol 108 (90 Base) MCG/ACT inhaler  Commonly known as: PROAIR RESPICLICK      Inhale 2 puffs Every 4 (Four) Hours As Needed for Wheezing or Shortness of Air.       ammonium lactate 12 % lotion  Commonly known as: LAC-HYDRIN      Apply 1 Application topically to the appropriate area as directed 2 (Two) Times a Day.       aspirin 81 MG EC tablet      Take 1 tablet by mouth Daily.       B-D ULTRAFINE III SHORT PEN 31G X 8 MM misc  Generic drug: Insulin Pen Needle      USE AS DIRECTED TO INJECT MEDICATION 4 TIMES DAILY       Pen Needles 31G X 5 MM misc      Use As Directed four times per day       BASAGLAR KWIKPEN SC      Inject 50 Units into the appropriate muscle as directed by prescriber Daily.       bumetanide 1 MG tablet  Commonly known as: BUMEX      bumetanide 1 mg tablet   Take 1 tablet twice a day for 3 days, " then take 1 tablet daily       bumetanide 1 MG tablet  Commonly known as: BUMEX      Take 1 tablet by mouth Daily.       buPROPion  MG 24 hr tablet  Commonly known as: Wellbutrin XL      Take 1 tablet by mouth Daily.       FLUoxetine 20 MG capsule  Commonly known as: PROzac      Take 1 capsule by mouth Daily.       Jardiance 25 MG tablet tablet  Generic drug: empagliflozin      Take 1 tablet by mouth Every Morning.       Jardiance 25 MG tablet tablet  Generic drug: empagliflozin      Take 1 tablet by mouth Every Morning.       lamoTRIgine 150 MG tablet  Commonly known as: LaMICtal      Take 2 tablets by mouth Daily.       losartan 100 MG tablet  Commonly known as: COZAAR      TAKE 1 TABLET BY MOUTH EVERY DAY WITH HYDROCHLOROTHIAZIDE 25 MG TABLET       losartan-hydrochlorothiazide 100-25 MG per tablet  Commonly known as: HYZAAR      Take 1 tablet by mouth Daily.       metFORMIN  MG 24 hr tablet  Commonly known as: GLUCOPHAGE-XR      Take 2 tablets by mouth Every Morning AND 2 tablets Every Night.       Mounjaro 7.5 MG/0.5ML solution auto-injector  Generic drug: Tirzepatide      Inject 0.5 mL under the skin into the appropriate area as directed Every 7 (Seven) Days.       Mounjaro 7.5 MG/0.5ML solution pen-injector  Generic drug: Tirzepatide      Inject 0.5 mL under the skin into the appropriate area as directed 1 (One) Time Per Week.       NovoLOG FlexPen 100 UNIT/ML solution pen-injector sc pen  Generic drug: insulin aspart           NovoLOG FlexPen 100 UNIT/ML solution pen-injector sc pen  Generic drug: insulin aspart      Inject up to 42 units under the skin into the appropriate area in the morning before breakfast, 20 units before lunch/snack, & up to 65 units before dinner. (Plus 2 units to prime needle 3 times daily)       pantoprazole 40 MG EC tablet  Commonly known as: PROTONIX      Take 1 tablet by mouth Daily.       pravastatin 40 MG tablet  Commonly known as: PRAVACHOL      Take 1 tablet every  day by oral route for 90 days.       sucralfate 1 g tablet  Commonly known as: CARAFATE      Take 1 tablet by mouth 4 (Four) Times a Day.       Tresiba FlexTouch 200 UNIT/ML solution pen-injector pen injection  Generic drug: Insulin Degludec      INJECT 50 UNITS UNDER THE SKIN ON ONE SIDE OF ABDOMEN AND 50 UNITS ON OTHER SIDE OF ABDOMEN EVERY NIGHT, PLUS 2 UNITS PER INJECTION TO PRIME       Tresiba FlexTouch 200 UNIT/ML solution pen-injector pen injection  Generic drug: Insulin Degludec      Inject 50 units on one side of abdomen and 50 units on other side of abdomen at night (Total of 100 units) plus 2 units per injection to prime needle (max daily dose of 104 units)       Tresiba FlexTouch 200 UNIT/ML solution pen-injector pen injection  Generic drug: Insulin Degludec      Inject 50 units on one side of abdomen & 50 units on other side of abdomen at night (total 100 units) plus 2 units per injection to prime needle (max daily dose: 104 units)       Tresiba FlexTouch 200 UNIT/ML solution pen-injector pen injection  Generic drug: Insulin Degludec      Inject 50 units under the skin into the appropriate area as directed once daily; plus 2 units per injection to prime needle                 Where to Get Your Medications        These medications were sent to [id number] - [place], [place] - [address] - [phone]  - [phone] FX [address] [place] [address]      Phone: [phone]   ondansetron ODT 8 MG disintegrating tablet          MELVIN Fish   05/11/25   11:56 EDT       Time Spent on Discharge:  I spent  30  minutes on this discharge activity which included: face-to-face encounter with the patient, reviewing the data in the system, coordination of the care with the nursing staff as well as consultants, documentation, and entering orders. Patient spent 20 hours in the CDU.

## 2025-05-12 ENCOUNTER — APPOINTMENT (OUTPATIENT)
Dept: GENERAL RADIOLOGY | Facility: HOSPITAL | Age: 65
DRG: 388 | End: 2025-05-12
Payer: COMMERCIAL

## 2025-05-12 PROBLEM — K76.0 STEATOSIS OF LIVER: Status: ACTIVE | Noted: 2025-04-14

## 2025-05-12 PROBLEM — J30.9 ALLERGIC RHINITIS: Status: ACTIVE | Noted: 2025-04-14

## 2025-05-12 LAB
ANION GAP SERPL CALCULATED.3IONS-SCNC: 17 MMOL/L (ref 5–15)
BUN SERPL-MCNC: 24 MG/DL (ref 8–23)
BUN/CREAT SERPL: 20.9 (ref 7–25)
CALCIUM SPEC-SCNC: 7.7 MG/DL (ref 8.6–10.5)
CHLORIDE SERPL-SCNC: 96 MMOL/L (ref 98–107)
CO2 SERPL-SCNC: 18 MMOL/L (ref 22–29)
CREAT SERPL-MCNC: 1.15 MG/DL (ref 0.76–1.27)
DEPRECATED RDW RBC AUTO: 43.6 FL (ref 37–54)
EGFRCR SERPLBLD CKD-EPI 2021: 71.5 ML/MIN/1.73
ERYTHROCYTE [DISTWIDTH] IN BLOOD BY AUTOMATED COUNT: 13.7 % (ref 12.3–15.4)
GLUCOSE BLDC GLUCOMTR-MCNC: 181 MG/DL (ref 70–130)
GLUCOSE BLDC GLUCOMTR-MCNC: 198 MG/DL (ref 70–130)
GLUCOSE BLDC GLUCOMTR-MCNC: 244 MG/DL (ref 70–130)
GLUCOSE BLDC GLUCOMTR-MCNC: 253 MG/DL (ref 70–130)
GLUCOSE BLDC GLUCOMTR-MCNC: 272 MG/DL (ref 70–130)
GLUCOSE BLDC GLUCOMTR-MCNC: 340 MG/DL (ref 70–130)
GLUCOSE SERPL-MCNC: 313 MG/DL (ref 65–99)
HCT VFR BLD AUTO: 43.1 % (ref 37.5–51)
HGB BLD-MCNC: 14.5 G/DL (ref 13–17.7)
MAGNESIUM SERPL-MCNC: 2 MG/DL (ref 1.6–2.4)
MCH RBC QN AUTO: 29.1 PG (ref 26.6–33)
MCHC RBC AUTO-ENTMCNC: 33.6 G/DL (ref 31.5–35.7)
MCV RBC AUTO: 86.4 FL (ref 79–97)
PLATELET # BLD AUTO: 308 10*3/MM3 (ref 140–450)
PMV BLD AUTO: 10 FL (ref 6–12)
POTASSIUM SERPL-SCNC: 3.3 MMOL/L (ref 3.5–5.2)
POTASSIUM SERPL-SCNC: 3.3 MMOL/L (ref 3.5–5.2)
RBC # BLD AUTO: 4.99 10*6/MM3 (ref 4.14–5.8)
SODIUM SERPL-SCNC: 131 MMOL/L (ref 136–145)
WBC NRBC COR # BLD AUTO: 3.66 10*3/MM3 (ref 3.4–10.8)

## 2025-05-12 PROCEDURE — 84132 ASSAY OF SERUM POTASSIUM: CPT | Performed by: STUDENT IN AN ORGANIZED HEALTH CARE EDUCATION/TRAINING PROGRAM

## 2025-05-12 PROCEDURE — 99222 1ST HOSP IP/OBS MODERATE 55: CPT | Performed by: STUDENT IN AN ORGANIZED HEALTH CARE EDUCATION/TRAINING PROGRAM

## 2025-05-12 PROCEDURE — 85027 COMPLETE CBC AUTOMATED: CPT | Performed by: STUDENT IN AN ORGANIZED HEALTH CARE EDUCATION/TRAINING PROGRAM

## 2025-05-12 PROCEDURE — 25010000002 POTASSIUM CHLORIDE 10 MEQ/100ML SOLUTION: Performed by: INTERNAL MEDICINE

## 2025-05-12 PROCEDURE — 82948 REAGENT STRIP/BLOOD GLUCOSE: CPT

## 2025-05-12 PROCEDURE — 25010000002 METOCLOPRAMIDE PER 10 MG: Performed by: INTERNAL MEDICINE

## 2025-05-12 PROCEDURE — 87040 BLOOD CULTURE FOR BACTERIA: CPT | Performed by: EMERGENCY MEDICINE

## 2025-05-12 PROCEDURE — 25010000002 METOCLOPRAMIDE PER 10 MG: Performed by: EMERGENCY MEDICINE

## 2025-05-12 PROCEDURE — 63710000001 INSULIN REGULAR HUMAN PER 5 UNITS: Performed by: STUDENT IN AN ORGANIZED HEALTH CARE EDUCATION/TRAINING PROGRAM

## 2025-05-12 PROCEDURE — 74018 RADEX ABDOMEN 1 VIEW: CPT

## 2025-05-12 PROCEDURE — 63710000001 INSULIN GLARGINE PER 5 UNITS: Performed by: STUDENT IN AN ORGANIZED HEALTH CARE EDUCATION/TRAINING PROGRAM

## 2025-05-12 PROCEDURE — G0378 HOSPITAL OBSERVATION PER HR: HCPCS

## 2025-05-12 PROCEDURE — 25810000003 LACTATED RINGERS PER 1000 ML: Performed by: INTERNAL MEDICINE

## 2025-05-12 PROCEDURE — 25010000002 METOCLOPRAMIDE PER 10 MG: Performed by: STUDENT IN AN ORGANIZED HEALTH CARE EDUCATION/TRAINING PROGRAM

## 2025-05-12 PROCEDURE — 25010000002 HEPARIN (PORCINE) PER 1000 UNITS: Performed by: STUDENT IN AN ORGANIZED HEALTH CARE EDUCATION/TRAINING PROGRAM

## 2025-05-12 PROCEDURE — 83735 ASSAY OF MAGNESIUM: CPT | Performed by: STUDENT IN AN ORGANIZED HEALTH CARE EDUCATION/TRAINING PROGRAM

## 2025-05-12 PROCEDURE — 25010000002 POTASSIUM CHLORIDE 10 MEQ/100ML SOLUTION

## 2025-05-12 PROCEDURE — 80048 BASIC METABOLIC PNL TOTAL CA: CPT | Performed by: STUDENT IN AN ORGANIZED HEALTH CARE EDUCATION/TRAINING PROGRAM

## 2025-05-12 PROCEDURE — 25810000003 LACTATED RINGERS PER 1000 ML: Performed by: STUDENT IN AN ORGANIZED HEALTH CARE EDUCATION/TRAINING PROGRAM

## 2025-05-12 RX ORDER — DEXTROSE MONOHYDRATE 25 G/50ML
25 INJECTION, SOLUTION INTRAVENOUS
Status: DISCONTINUED | OUTPATIENT
Start: 2025-05-12 | End: 2025-05-16 | Stop reason: HOSPADM

## 2025-05-12 RX ORDER — LAMOTRIGINE 100 MG/1
300 TABLET ORAL DAILY
Status: DISCONTINUED | OUTPATIENT
Start: 2025-05-12 | End: 2025-05-16 | Stop reason: HOSPADM

## 2025-05-12 RX ORDER — ACETAMINOPHEN 325 MG/1
650 TABLET ORAL EVERY 4 HOURS PRN
Status: DISCONTINUED | OUTPATIENT
Start: 2025-05-12 | End: 2025-05-16 | Stop reason: HOSPADM

## 2025-05-12 RX ORDER — PRAVASTATIN SODIUM 40 MG
40 TABLET ORAL DAILY
Status: DISCONTINUED | OUTPATIENT
Start: 2025-05-12 | End: 2025-05-16 | Stop reason: HOSPADM

## 2025-05-12 RX ORDER — HEPARIN SODIUM 5000 [USP'U]/ML
5000 INJECTION, SOLUTION INTRAVENOUS; SUBCUTANEOUS EVERY 8 HOURS SCHEDULED
Status: DISCONTINUED | OUTPATIENT
Start: 2025-05-12 | End: 2025-05-16 | Stop reason: HOSPADM

## 2025-05-12 RX ORDER — HYDROCHLOROTHIAZIDE 25 MG/1
25 TABLET ORAL
Status: DISCONTINUED | OUTPATIENT
Start: 2025-05-12 | End: 2025-05-16 | Stop reason: HOSPADM

## 2025-05-12 RX ORDER — POTASSIUM CHLORIDE 1500 MG/1
40 TABLET, EXTENDED RELEASE ORAL EVERY 4 HOURS
Status: DISCONTINUED | OUTPATIENT
Start: 2025-05-12 | End: 2025-05-12

## 2025-05-12 RX ORDER — METOCLOPRAMIDE HYDROCHLORIDE 5 MG/ML
5 INJECTION INTRAMUSCULAR; INTRAVENOUS 3 TIMES DAILY
Status: DISCONTINUED | OUTPATIENT
Start: 2025-05-12 | End: 2025-05-12

## 2025-05-12 RX ORDER — BUMETANIDE 1 MG/1
1 TABLET ORAL DAILY
Status: DISCONTINUED | OUTPATIENT
Start: 2025-05-12 | End: 2025-05-16 | Stop reason: HOSPADM

## 2025-05-12 RX ORDER — SUCRALFATE 1 G/1
1 TABLET ORAL 4 TIMES DAILY
Status: DISCONTINUED | OUTPATIENT
Start: 2025-05-12 | End: 2025-05-16 | Stop reason: HOSPADM

## 2025-05-12 RX ORDER — POLYETHYLENE GLYCOL 3350 17 G/17G
17 POWDER, FOR SOLUTION ORAL DAILY PRN
Status: DISCONTINUED | OUTPATIENT
Start: 2025-05-12 | End: 2025-05-16 | Stop reason: HOSPADM

## 2025-05-12 RX ORDER — MORPHINE SULFATE 2 MG/ML
1 INJECTION, SOLUTION INTRAMUSCULAR; INTRAVENOUS EVERY 4 HOURS PRN
Status: DISCONTINUED | OUTPATIENT
Start: 2025-05-12 | End: 2025-05-16 | Stop reason: HOSPADM

## 2025-05-12 RX ORDER — BUPROPION HYDROCHLORIDE 150 MG/1
300 TABLET ORAL DAILY
Status: DISCONTINUED | OUTPATIENT
Start: 2025-05-12 | End: 2025-05-16 | Stop reason: HOSPADM

## 2025-05-12 RX ORDER — POTASSIUM CHLORIDE 7.45 MG/ML
10 INJECTION INTRAVENOUS
Status: COMPLETED | OUTPATIENT
Start: 2025-05-12 | End: 2025-05-13

## 2025-05-12 RX ORDER — SODIUM CHLORIDE, SODIUM LACTATE, POTASSIUM CHLORIDE, CALCIUM CHLORIDE 600; 310; 30; 20 MG/100ML; MG/100ML; MG/100ML; MG/100ML
100 INJECTION, SOLUTION INTRAVENOUS CONTINUOUS
Status: ACTIVE | OUTPATIENT
Start: 2025-05-12 | End: 2025-05-15

## 2025-05-12 RX ORDER — POTASSIUM CHLORIDE 7.45 MG/ML
10 INJECTION INTRAVENOUS
Status: COMPLETED | OUTPATIENT
Start: 2025-05-12 | End: 2025-05-12

## 2025-05-12 RX ORDER — METFORMIN HYDROCHLORIDE 500 MG/1
1000 TABLET, EXTENDED RELEASE ORAL EVERY MORNING
Status: DISCONTINUED | OUTPATIENT
Start: 2025-05-12 | End: 2025-05-16 | Stop reason: HOSPADM

## 2025-05-12 RX ORDER — NALOXONE HCL 0.4 MG/ML
0.4 VIAL (ML) INJECTION
Status: DISCONTINUED | OUTPATIENT
Start: 2025-05-12 | End: 2025-05-16 | Stop reason: HOSPADM

## 2025-05-12 RX ORDER — BISACODYL 10 MG
10 SUPPOSITORY, RECTAL RECTAL DAILY PRN
Status: DISCONTINUED | OUTPATIENT
Start: 2025-05-12 | End: 2025-05-16 | Stop reason: HOSPADM

## 2025-05-12 RX ORDER — IBUPROFEN 600 MG/1
1 TABLET ORAL
Status: DISCONTINUED | OUTPATIENT
Start: 2025-05-12 | End: 2025-05-16 | Stop reason: HOSPADM

## 2025-05-12 RX ORDER — METOCLOPRAMIDE HYDROCHLORIDE 5 MG/ML
10 INJECTION INTRAMUSCULAR; INTRAVENOUS EVERY 6 HOURS
Status: DISCONTINUED | OUTPATIENT
Start: 2025-05-12 | End: 2025-05-15

## 2025-05-12 RX ORDER — METFORMIN HYDROCHLORIDE 500 MG/1
1000 TABLET, EXTENDED RELEASE ORAL NIGHTLY
Status: DISCONTINUED | OUTPATIENT
Start: 2025-05-12 | End: 2025-05-16 | Stop reason: HOSPADM

## 2025-05-12 RX ORDER — ASPIRIN 81 MG/1
81 TABLET ORAL DAILY
Status: DISCONTINUED | OUTPATIENT
Start: 2025-05-12 | End: 2025-05-16 | Stop reason: HOSPADM

## 2025-05-12 RX ORDER — NICOTINE POLACRILEX 4 MG
15 LOZENGE BUCCAL
Status: DISCONTINUED | OUTPATIENT
Start: 2025-05-12 | End: 2025-05-16 | Stop reason: HOSPADM

## 2025-05-12 RX ORDER — BISACODYL 5 MG/1
5 TABLET, DELAYED RELEASE ORAL DAILY PRN
Status: DISCONTINUED | OUTPATIENT
Start: 2025-05-12 | End: 2025-05-16 | Stop reason: HOSPADM

## 2025-05-12 RX ORDER — LOSARTAN POTASSIUM 50 MG/1
100 TABLET ORAL
Status: DISCONTINUED | OUTPATIENT
Start: 2025-05-12 | End: 2025-05-16 | Stop reason: HOSPADM

## 2025-05-12 RX ORDER — PANTOPRAZOLE SODIUM 40 MG/10ML
40 INJECTION, POWDER, LYOPHILIZED, FOR SOLUTION INTRAVENOUS
Status: DISCONTINUED | OUTPATIENT
Start: 2025-05-12 | End: 2025-05-16 | Stop reason: HOSPADM

## 2025-05-12 RX ORDER — AMOXICILLIN 250 MG
2 CAPSULE ORAL 2 TIMES DAILY PRN
Status: DISCONTINUED | OUTPATIENT
Start: 2025-05-12 | End: 2025-05-16 | Stop reason: HOSPADM

## 2025-05-12 RX ORDER — METOCLOPRAMIDE HYDROCHLORIDE 5 MG/ML
10 INJECTION INTRAMUSCULAR; INTRAVENOUS ONCE
Status: COMPLETED | OUTPATIENT
Start: 2025-05-12 | End: 2025-05-12

## 2025-05-12 RX ORDER — SODIUM CHLORIDE 0.9 % (FLUSH) 0.9 %
10 SYRINGE (ML) INJECTION AS NEEDED
Status: DISCONTINUED | OUTPATIENT
Start: 2025-05-12 | End: 2025-05-16 | Stop reason: HOSPADM

## 2025-05-12 RX ORDER — ACETAMINOPHEN 650 MG/1
650 SUPPOSITORY RECTAL EVERY 4 HOURS PRN
Status: DISCONTINUED | OUTPATIENT
Start: 2025-05-12 | End: 2025-05-16 | Stop reason: HOSPADM

## 2025-05-12 RX ORDER — ACETAMINOPHEN 160 MG/5ML
650 SOLUTION ORAL EVERY 4 HOURS PRN
Status: DISCONTINUED | OUTPATIENT
Start: 2025-05-12 | End: 2025-05-16 | Stop reason: HOSPADM

## 2025-05-12 RX ORDER — METOCLOPRAMIDE HYDROCHLORIDE 5 MG/ML
10 INJECTION INTRAMUSCULAR; INTRAVENOUS EVERY 6 HOURS
Status: DISCONTINUED | OUTPATIENT
Start: 2025-05-12 | End: 2025-05-12

## 2025-05-12 RX ORDER — SODIUM CHLORIDE 0.9 % (FLUSH) 0.9 %
10 SYRINGE (ML) INJECTION EVERY 12 HOURS SCHEDULED
Status: DISCONTINUED | OUTPATIENT
Start: 2025-05-12 | End: 2025-05-16 | Stop reason: HOSPADM

## 2025-05-12 RX ORDER — ALBUTEROL SULFATE 0.83 MG/ML
2.5 SOLUTION RESPIRATORY (INHALATION) EVERY 4 HOURS PRN
Status: DISCONTINUED | OUTPATIENT
Start: 2025-05-12 | End: 2025-05-16 | Stop reason: HOSPADM

## 2025-05-12 RX ORDER — SODIUM CHLORIDE 9 MG/ML
40 INJECTION, SOLUTION INTRAVENOUS AS NEEDED
Status: DISCONTINUED | OUTPATIENT
Start: 2025-05-12 | End: 2025-05-16 | Stop reason: HOSPADM

## 2025-05-12 RX ADMIN — POTASSIUM CHLORIDE 10 MEQ: 7.46 INJECTION, SOLUTION INTRAVENOUS at 10:35

## 2025-05-12 RX ADMIN — INSULIN HUMAN 2 UNITS: 100 INJECTION, SOLUTION PARENTERAL at 17:29

## 2025-05-12 RX ADMIN — METOCLOPRAMIDE 10 MG: 5 INJECTION, SOLUTION INTRAMUSCULAR; INTRAVENOUS at 20:41

## 2025-05-12 RX ADMIN — POTASSIUM CHLORIDE 10 MEQ: 7.46 INJECTION, SOLUTION INTRAVENOUS at 20:41

## 2025-05-12 RX ADMIN — PANTOPRAZOLE SODIUM 40 MG: 40 INJECTION, POWDER, FOR SOLUTION INTRAVENOUS at 05:56

## 2025-05-12 RX ADMIN — METOCLOPRAMIDE 10 MG: 5 INJECTION, SOLUTION INTRAMUSCULAR; INTRAVENOUS at 14:33

## 2025-05-12 RX ADMIN — POTASSIUM CHLORIDE 10 MEQ: 7.46 INJECTION, SOLUTION INTRAVENOUS at 08:09

## 2025-05-12 RX ADMIN — HEPARIN SODIUM 5000 UNITS: 5000 INJECTION INTRAVENOUS; SUBCUTANEOUS at 05:56

## 2025-05-12 RX ADMIN — POTASSIUM CHLORIDE 10 MEQ: 7.46 INJECTION, SOLUTION INTRAVENOUS at 09:28

## 2025-05-12 RX ADMIN — POTASSIUM CHLORIDE 10 MEQ: 7.46 INJECTION, SOLUTION INTRAVENOUS at 11:35

## 2025-05-12 RX ADMIN — Medication 10 ML: at 08:09

## 2025-05-12 RX ADMIN — METOCLOPRAMIDE 10 MG: 5 INJECTION, SOLUTION INTRAMUSCULAR; INTRAVENOUS at 01:11

## 2025-05-12 RX ADMIN — HEPARIN SODIUM 5000 UNITS: 5000 INJECTION INTRAVENOUS; SUBCUTANEOUS at 20:41

## 2025-05-12 RX ADMIN — SODIUM CHLORIDE, SODIUM LACTATE, POTASSIUM CHLORIDE, AND CALCIUM CHLORIDE 100 ML/HR: 600; 310; 30; 20 INJECTION, SOLUTION INTRAVENOUS at 12:53

## 2025-05-12 RX ADMIN — Medication 10 ML: at 20:42

## 2025-05-12 RX ADMIN — HEPARIN SODIUM 5000 UNITS: 5000 INJECTION INTRAVENOUS; SUBCUTANEOUS at 14:33

## 2025-05-12 RX ADMIN — POTASSIUM CHLORIDE 10 MEQ: 7.46 INJECTION, SOLUTION INTRAVENOUS at 17:29

## 2025-05-12 RX ADMIN — INSULIN HUMAN 7 UNITS: 100 INJECTION, SOLUTION PARENTERAL at 02:07

## 2025-05-12 RX ADMIN — POTASSIUM CHLORIDE 10 MEQ: 7.46 INJECTION, SOLUTION INTRAVENOUS at 21:42

## 2025-05-12 RX ADMIN — METOCLOPRAMIDE 5 MG: 5 INJECTION, SOLUTION INTRAMUSCULAR; INTRAVENOUS at 08:09

## 2025-05-12 RX ADMIN — POTASSIUM CHLORIDE 10 MEQ: 7.46 INJECTION, SOLUTION INTRAVENOUS at 19:23

## 2025-05-12 RX ADMIN — SODIUM CHLORIDE, SODIUM LACTATE, POTASSIUM CHLORIDE, AND CALCIUM CHLORIDE 100 ML/HR: 600; 310; 30; 20 INJECTION, SOLUTION INTRAVENOUS at 02:54

## 2025-05-12 RX ADMIN — INSULIN HUMAN 4 UNITS: 100 INJECTION, SOLUTION PARENTERAL at 11:23

## 2025-05-12 RX ADMIN — ACETAMINOPHEN 650 MG: 650 SOLUTION ORAL at 17:58

## 2025-05-12 RX ADMIN — SODIUM CHLORIDE, SODIUM LACTATE, POTASSIUM CHLORIDE, AND CALCIUM CHLORIDE 100 ML/HR: 600; 310; 30; 20 INJECTION, SOLUTION INTRAVENOUS at 21:42

## 2025-05-12 RX ADMIN — INSULIN HUMAN 6 UNITS: 100 INJECTION, SOLUTION PARENTERAL at 05:56

## 2025-05-12 RX ADMIN — INSULIN HUMAN 2 UNITS: 100 INJECTION, SOLUTION PARENTERAL at 23:52

## 2025-05-12 RX ADMIN — INSULIN GLARGINE 25 UNITS: 100 INJECTION, SOLUTION SUBCUTANEOUS at 08:08

## 2025-05-12 NOTE — PROGRESS NOTES
[facility] Medicine Services  PROGRESS NOTE    Patient Name: Joselito Chowdary III  : 1960  MRN: [id number]    Date of Admission: 2025  Primary Care Physician: Melissa Guerra APRN    Subjective   Subjective     CC:  Follow-up for ileus versus bowel obstruction    HPI:  Patient seen and examined this morning.  Feeling much better after NG tube placement.  Already had 1.4 L of bile drained for NG tube placed overnight.  Still complaining of occasional colicky abdominal pain.  Passing gas but no bowel movement      Objective   Objective     Vital Signs:   Temp:  [98 °F (36.7 °C)-98.7 °F (37.1 °C)] 98.1 °F (36.7 °C)  Heart Rate:  [] 88  Resp:  [18-20] 18  BP: (106-176)/() 149/65     Physical Exam:  General: Comfortable, not in distress, conversant and cooperative  Head: Atraumatic and normocephalic  Eyes: No Icterus. No pallor  Ears:  Ears appear intact with no abnormalities noted  Throat: No oral lesions, no thrush  Neck: Supple, trachea midline  Lungs: Clear to auscultation bilaterally, equal air entry, no wheezing or crackles  Heart:  Normal S1 and S2, no murmur, no gallop, No JVD, no lower extremity swelling  Abdomen:  Soft, distended, no tenderness.  Hypoactive bowel sounds  Skin: No bleeding, bruising or rash, normal skin turgor and elasticity  Neurologic: Cranial nerves appear intact with no evidence of facial asymmetry, normal motor and sensory functions in all 4 extremities  Psych: Alert and oriented x 3, normal mood    Results Reviewed:  LAB RESULTS:      Lab 25  0332 25  2313 25  2124 25  0511 05/10/25  1242 25  1016 25  0844   WBC 3.66  --  4.38 4.15 4.27  --  9.05   HEMOGLOBIN 14.5  --  14.9 14.5 16.4  --  17.5   HEMATOCRIT 43.1  --  42.8 41.9 47.8  --  51.5*   PLATELETS 308  --  288 243 248  --  245   NEUTROS ABS  --   --  2.72 2.10 2.81  --  6.97   IMMATURE GRANS (ABS)  --   --   --   --  0.02  --  0.01   LYMPHS  ABS  --   --   --   --  0.60*  --  1.06   MONOS ABS  --   --   --   --  0.81  --  0.76   EOS ABS  --   --  0.00 0.05 0.02  --  0.23   MCV 86.4  --  85.3 85.0 85.2  --  85.4   CRP  --   --   --   --   --   --  1.88*   LACTATE  --   --  2.0  --  2.0  --  1.7   HSTROP T  --  17 26*  --   --  15 16         Lab 05/12/25  1555 05/12/25  0332 05/11/25  2124 05/11/25  0511 05/10/25  1320 05/08/25  0844   SODIUM  --  131* 130* 132* 132* 135*   POTASSIUM 3.3* 3.3* 3.8 3.5 3.6 3.4*   CHLORIDE  --  96* 93* 97* 97* 99   CO2  --  18.0* 18.0* 21.0* 22.6 22.2   ANION GAP  --  17.0* 19.0* 14.0 12.4 13.8   BUN  --  24* 24* 16 12 18   CREATININE  --  1.15 1.30* 1.02 1.05 1.01   EGFR  --  71.5 61.7 82.6 79.8 83.6   GLUCOSE  --  313* 375* 270* 342* 239*   CALCIUM  --  7.7* 7.7* 7.6* 7.3* 8.8   MAGNESIUM  --  2.0  --   --  1.8  --    HEMOGLOBIN A1C  --   --   --  6.69*  --   --          Lab 05/11/25  2124 05/11/25  0511 05/10/25  1320 05/08/25  0844   TOTAL PROTEIN 5.5* 5.4* 5.3* 7.3   ALBUMIN 2.9* 3.0* 3.0* 4.3   GLOBULIN 2.6 2.4 2.3 3.0   ALT (SGPT) 26 33 42* 50*   AST (SGOT) 11 15 35 71*   BILIRUBIN 1.0 1.0 1.0 0.6   ALK PHOS 56 54 56 88   LIPASE 24  --  12* 25         Lab 05/11/25 2313 05/11/25 2124 05/08/25  1016 05/08/25  0844   PROBNP  --   --   --  65.5   HSTROP T 17 26* 15 16                 Lab 05/11/25  2328   PH, ARTERIAL 7.396   PCO2, ARTERIAL 31.2*   PO2 ART 72.5*   FIO2 21   HCO3 ART 19.1*   BASE EXCESS ART -4.6*   CARBOXYHEMOGLOBIN 1.3     Brief Urine Lab Results  (Last result in the past 365 days)        Color   Clarity   Blood   Leuk Est   Nitrite   Protein   CREAT   Urine HCG        05/11/25 2138 Dark Yellow   Clear   Negative   Negative   Negative   30 mg/dL (1+)                   Microbiology Results Abnormal       None            XR Abdomen KUB  Result Date: 5/12/2025  XR ABDOMEN KUB Date of Exam: 5/12/2025 1:27 AM EDT Indication: ng tube placement Comparison: CT abdomen pelvis 5/11/2025 Findings: NG tube tip is  present near the fundus of the stomach. Cholecystectomy clips are present. There are some gas-filled small bowel loops in the upper abdomen.     Impression: NG tube tip near the fundus of the stomach. Electronically Signed: Marcos Koroma MD  5/12/2025 3:37 AM EDT  Workstation ID: [id number]    CT Abdomen Pelvis With Contrast  Result Date: 5/12/2025  CT ABDOMEN PELVIS W CONTRAST Date of Exam: 5/11/2025 10:46 PM EDT Indication: abdominal pain/nausea and vomiting. Comparison: CT abdomen pelvis 5/10/2025 Technique: Axial CT images were obtained of the abdomen and pelvis following the uneventful intravenous administration of 85 mL Isovue-300. Reconstructed coronal and sagittal images were also obtained. Automated exposure control and iterative construction methods were used. Findings: Lung Bases:   There is airspace disease in the medial left lower lobe with an air bronchogram. This could be atelectasis or pneumonia. There is fluid in the distal esophagus which is likely from reflux. Liver: Liver is normal in size and CT density. No focal lesions. Biliary/Gallbladder:  The gallbladder is surgically absent. The biliary tree is nondilated. Spleen: Spleen is normal in size and CT density. Pancreas:  Pancreas is normal. There is no evidence of pancreatic mass or peripancreatic fluid. Kidneys:  Kidneys are normal in size. There are no stones or hydronephrosis. Adrenals:  Adrenal glands are unremarkable. Retroperitoneal/Lymph Nodes/Vasculature:  No retroperitoneal adenopathy is identified. Gastrointestinal/Mesentery:  There is gastric distention. There is distention of the small bowel to the level of the right lower quadrant. The distalmost small bowel is decompressed but there is no discrete transition point, rather a more gradual change to the level of the colon. The entirety of the colon is normal. Findings are most likely secondary to adynamic ileus. Bladder:  The bladder is normal. Genital:   Unremarkable       Bony  Structures:   Visualized bony structures are consistent with the patient's age.     Impression: Impression: 1.Gastric distention and small bowel distention to the level of the right lower quadrant. The distal small bowel is decompressed but there is no discrete transition point. Findings are most likely secondary to adynamic ileus. 2.Left lower lobe airspace disease could be atelectasis or pneumonia. Electronically Signed: Ethan Partida MD  5/12/2025 12:19 AM EDT  Workstation ID: [id number]    XR Chest 1 View  Result Date: 5/11/2025  XR CHEST 1 VW Date of Exam: 5/11/2025 9:27 PM EDT Indication: dyspnea Comparison: Chest radiograph 7/12/2021 Findings: There are no airspace consolidations. No pleural fluid. No pneumothorax. The pulmonary vasculature appears within normal limits. The cardiac and mediastinal silhouette appear unremarkable. No acute osseous abnormality identified.     Impression: Impression: No active disease. Electronically Signed: Ethan Partida MD  5/11/2025 10:02 PM EDT  Workstation ID: [id number]      Results for orders placed during the hospital encounter of 07/12/21    Adult Transthoracic Echo Complete W/ Cont if Necessary Per Protocol    Interpretation Summary  · Normal LV systolic function  · Mild MR      Current medications:  Scheduled Meds:aspirin, 81 mg, Oral, Daily  [Held by provider] bumetanide, 1 mg, Oral, Daily  buPROPion XL, 300 mg, Oral, Daily  empagliflozin, 25 mg, Oral, QAM  FLUoxetine, 20 mg, Oral, Daily  heparin (porcine), 5,000 Units, Subcutaneous, Q8H  losartan, 100 mg, Oral, Q24H   And  [Held by provider] hydroCHLOROthiazide, 25 mg, Oral, Q24H  insulin glargine, 25 Units, Subcutaneous, Daily  insulin regular, 2-9 Units, Subcutaneous, Q6H  lamoTRIgine, 300 mg, Oral, Daily  [Held by provider] metFORMIN ER, 1,000 mg, Oral, QAM   And  [Held by provider] metFORMIN ER, 1,000 mg, Oral, Nightly  metoclopramide, 10 mg, Intravenous, Q6H  pantoprazole, 40 mg, Intravenous, Q AM  pravastatin, 40 mg,  Oral, Daily  sodium chloride, 10 mL, Intravenous, Q12H  [Held by provider] sucralfate, 1 g, Oral, 4x Daily      Continuous Infusions:lactated ringers, 100 mL/hr, Last Rate: 100 mL/hr (05/12/25 1521)      PRN Meds:.  acetaminophen **OR** acetaminophen **OR** acetaminophen    Albuterol Sulfate NEB Orderable    senna-docusate sodium **AND** polyethylene glycol **AND** bisacodyl **AND** bisacodyl    Calcium Replacement - Follow Nurse / BPA Driven Protocol    dextrose    dextrose    glucagon (human recombinant)    Magnesium Standard Dose Replacement - Follow Nurse / BPA Driven Protocol    Morphine **AND** naloxone    Phosphorus Replacement - Follow Nurse / BPA Driven Protocol    Potassium Replacement - Follow Nurse / BPA Driven Protocol    Sodium Chloride (PF)    [COMPLETED] Insert Peripheral IV **AND** sodium chloride    sodium chloride    sodium chloride    Assessment & Plan   Assessment & Plan     Active Hospital Problems    Diagnosis  POA    **Ileus [K56.7]  Yes    Anxiety [F41.9]  Yes    Dyslipidemia [E78.5]  Yes    Essential hypertension [I10]  Yes    Severe obstructive sleep apnea [G47.33]  Yes    Type 2 diabetes mellitus [E11.9]  Yes      Resolved Hospital Problems   No resolved problems to display.        Brief Hospital Course to date:  Joselito Chowdary III is a 65 y.o. male lat with history of HTN, T2DM, HLD, GT who is presenting with persistent abd pain and vomiting since 5/8/2025 requiring multiple ER visits.  CT abdomen from this admission 5/11/2025 concerning for small bowel obstruction versus ileus    Abdominal pain and vomiting  Ileus, possible diabetic vs post-infectious motility impairment  Patient symptoms precipitated by food poisoning/gastroenteritis.  Likely with a component of diabetic gastroparesis  Does not have much bowel sounds, passing gas but no bowel sounds which is consistent with ileus  CT abdomen is concerning for ileus versus bowel obstruction  Continue NG tube and  decompression  Discussed with Dr. Gamboa/general surgery.  Plan to continue NG for gastric decompression, n.p.o. status, continue supportive care.  Tentative plan for Small Bowel Follow-Through (SBFT) tomorrow  F/U GI PCR panel  Place NG to low intermittent wall suction  Continue Reglan   Continue IV fluids, and as needed pain control     МАРИЯ   Hyponatremia  Metabolic acidosis  МАРИЯ likely secondary to poor oral intake and vomiting.  Improved with IV fluids  Continue IV fluids  Replace electrolytes per protocol     Type 2 diabetes  Recent A1c 6.6%  Continue reduced dose basal insulin with SSI for now     Hypertension  Dyslipidemia  Continue losartan  Hold hydrochlorothiazide  Continue statins     GT         Expected Discharge Location and Transportation: Home  Expected Discharge   Expected Discharge Date: 5/14/2025; Expected Discharge Time:      VTE Prophylaxis:  Pharmacologic VTE prophylaxis orders are present.         AM-PAC 6 Clicks Score (PT): 21 (05/12/25 3807)    CODE STATUS:   Code Status and Medical Interventions: CPR (Attempt to Resuscitate); Full Support   Ordered at: 05/12/25 0113     Code Status (Patient has no pulse and is not breathing):    CPR (Attempt to Resuscitate)     Medical Interventions (Patient has pulse or is breathing):    Full Support       Natalie Webb MD  05/12/25

## 2025-05-12 NOTE — PLAN OF CARE
Goal Outcome Evaluation:  Plan of Care Reviewed With: patient        Progress: improving  Outcome Evaluation: Patient admitted to unit with complaints of abdominal pain, nausea and vomiting. Patient alert and oriented x 4. On room air. Normal sinus on monitor. NG tube in place per ED. No acute events this shift. Bed alarm set and safety precautions in place.

## 2025-05-12 NOTE — ED PROVIDER NOTES
Subjective   History of Present Illness  69-year-old male who presents with a complaint of intractable nausea vomiting.  The patient actually began to develop nausea and vomiting 1 week ago.  He he presented initially to [facility] emergency department on Thursday, 4 days into the symptoms.  He received some IV fluids and a CT scan that reported concern for gastroenteritis.  He ultimately was discharged home.  He once again began to have nausea vomiting at home and we presented to [facility] emergency department yesterday.  He was admitted to their CDU unit, repeat CT scan showed worsening findings, he ultimately was fluid resuscitated states that he felt better and was discharged home.  Very shortly after returning home he once again began to develop nausea and vomiting.  He does report a history of diabetes.  He denies being diagnosed with gastroparesis.  Previous abdominal surgeries include cholecystectomy.  He does report some generalized abdominal pain.  He denies dysuria.  No diarrhea or blood in the stool.  No chest pain.  He does report some upper respiratory congestion, and some sore throat.  No new medications.  He exhibits normal mentation.  No other acute complaints.      Review of Systems   Constitutional:  Positive for activity change, appetite change and fatigue. Negative for chills and fever.   HENT:  Positive for congestion. Negative for ear pain, postnasal drip, sinus pressure and sore throat.    Eyes:  Negative for pain, redness and visual disturbance.   Respiratory:  Negative for cough, chest tightness and shortness of breath.    Cardiovascular:  Negative for chest pain, palpitations and leg swelling.   Gastrointestinal:  Positive for abdominal pain, nausea and vomiting. Negative for anal bleeding, blood in stool and diarrhea.   Endocrine: Negative for polydipsia and polyuria.   Genitourinary:  Negative for difficulty urinating, dysuria, frequency  and urgency.   Musculoskeletal:  Negative for arthralgias, back pain and neck pain.   Skin:  Negative for pallor and rash.   Allergic/Immunologic: Negative for environmental allergies and immunocompromised state.   Neurological:  Negative for dizziness, weakness and headaches.   Hematological:  Negative for adenopathy.   Psychiatric/Behavioral:  Negative for confusion, self-injury and suicidal ideas. The patient is not nervous/anxious.    All other systems reviewed and are negative.      Past Medical History:   Diagnosis Date    Acromioclavicular separation 04/2018    Same as above problem    Bipolar 1 disorder     Diabetes mellitus     Dislocation, shoulder 04/2018    Same    Diverticulitis     Hyperlipidemia     Hypertension     Knee swelling     On first visit    Mood disorder     Respiratory infection     Rotator cuff syndrome 04/2018    R Shoulder       Allergies   Allergen Reactions    Atorvastatin Other (See Comments)     Muscle pain    Zestoretic [Lisinopril-Hydrochlorothiazide] Cough       Past Surgical History:   Procedure Laterality Date    CARDIAC CATHETERIZATION  2012    CARDIAC CATHETERIZATION N/A 07/13/2021    Procedure: Left Heart Cath;  Surgeon: Lois Levy MD;  Location: On license of [facility] CATH INVASIVE LOCATION;  Service: Cardiology;  Laterality: N/A;    CHOLECYSTECTOMY      COLONOSCOPY      GALLBLADDER SURGERY      SHOULDER SURGERY Right     REPAIR     TRIGGER POINT INJECTION      First and subsequent visits       Family History   Problem Relation Age of Onset    Cancer Mother     Hypertension Mother     Deep vein thrombosis Mother     Diabetes Father     Heart disease Father     Hypertension Father     COPD Father     Heart failure Father        Social History     Socioeconomic History    Marital status:    Tobacco Use    Smoking status: Former     Current packs/day: 0.00     Average packs/day: 1 pack/day for 10.0 years (10.0 ttl pk-yrs)     Types: Cigarettes     Start date: 1/1/1978      Quit date: 1986     Years since quittin.3    Smokeless tobacco: Never   Vaping Use    Vaping status: Never Used   Substance and Sexual Activity    Alcohol use: Not Currently     Comment: Occasional drink    Drug use: Never    Sexual activity: Not Currently     Partners: Female     Birth control/protection: None           Objective   Physical Exam  Vitals and nursing note reviewed.   Constitutional:       General: He is not in acute distress.     Appearance: Normal appearance. He is well-developed. He is ill-appearing. He is not toxic-appearing or diaphoretic.   HENT:      Head: Normocephalic and atraumatic.      Right Ear: External ear normal.      Left Ear: External ear normal.      Nose: Nose normal.   Eyes:      General: Lids are normal.      Pupils: Pupils are equal, round, and reactive to light.   Neck:      Trachea: No tracheal deviation.   Cardiovascular:      Rate and Rhythm: Regular rhythm. Tachycardia present.      Pulses: No decreased pulses.      Heart sounds: Normal heart sounds. No murmur heard.     No friction rub. No gallop.   Pulmonary:      Effort: Pulmonary effort is normal. No respiratory distress.      Breath sounds: Normal breath sounds. No decreased breath sounds, wheezing, rhonchi or rales.   Abdominal:      General: Bowel sounds are normal.      Palpations: Abdomen is soft.      Tenderness: There is generalized abdominal tenderness. There is no guarding or rebound.   Musculoskeletal:         General: No deformity. Normal range of motion.      Cervical back: Normal range of motion and neck supple.   Lymphadenopathy:      Cervical: No cervical adenopathy.   Skin:     General: Skin is warm and dry.      Findings: No rash.   Neurological:      Mental Status: He is alert and oriented to person, place, and time.      Cranial Nerves: No cranial nerve deficit.      Sensory: No sensory deficit.   Psychiatric:         Speech: Speech normal.         Behavior: Behavior normal.         Thought  Content: Thought content normal.         Judgment: Judgment normal.         Procedures           ED Course  ED Course as of 05/12/25 0050   Mon May 12, 2025   0003 The patient has had persistent nausea vomiting for the last week.  He was seen at [facility] ED on Thursday camp, and on Saturday.  Despite reported feeling better while at their facility he has redevelopment of nausea vomiting soon after being discharged.  He had a CT scan performed on Thursday that reported gastroenteritis.  CT scan performed Saturday, yesterday with reported worsening abdominal distention and concern for ileus.  Repeat CT scan here today likewise looks like worsening abdominal distention and ileus.  He is a known diabetic and is hyperglycemic.  I feel gastroparesis is contributing to continued ileus.  I have ordered IV fluids and nausea medication here.  Would like to admit for decompression and nausea control.  Hyperglycemia and increased gap acidosis has worsened with each presentation.  Whit blood cell count is normal and no acute infectious abnormality identified on current workup. [NS]      ED Course User Index  [NS] Morris Guardado MD                                                       Medical Decision Making  Differential includes bowel obstruction, ileus, dehydration, renal insufficiency, acute infectious process, other unspecified etiology.    Viral respiratory panel is negative for infection.  Urine shows elevated glucose and elevated ketones concerning for dehydration but no acute infectious process.    Troponin remained nonconcerning x 2.    Labs did show normal white count, normal H&H, elevated creatinine, and increased anion gap and decreased bicarb.  ABG shows a normal pH of 7.39.    Chest x-ray interpreted by me shows normal heart size and clear lungs.    CT scan abdomen pelvis with contrast interpreted by me shows ileus, no true transition point to support bowel obstruction.    Radiology  interpretation of CT scan reports possible pneumonia in left lower lobe.  I have ordered blood cultures and antibiotics.    NG tube has been ordered.    I have administered IV fluids and nausea medication.    I discussed the patient with the hospitalist who will consult for admission.      Problems Addressed:  Acute renal insufficiency: complicated acute illness or injury with systemic symptoms  Ileus: complicated acute illness or injury with systemic symptoms that poses a threat to life or bodily functions  Increased anion gap metabolic acidosis: complicated acute illness or injury with systemic symptoms  Intractable nausea and vomiting: complicated acute illness or injury with systemic symptoms that poses a threat to life or bodily functions  Pneumonia of left lower lobe due to infectious organism: complicated acute illness or injury with systemic symptoms    Amount and/or Complexity of Data Reviewed  Independent Historian: spouse     Details: Wife and family members provide additional history.  External Data Reviewed: labs, radiology, ECG and notes.  Labs: ordered. Decision-making details documented in ED Course.  Radiology: ordered and independent interpretation performed. Decision-making details documented in ED Course.    Risk  Prescription drug management.  Decision regarding hospitalization.        Final diagnoses:   Intractable nausea and vomiting   Acute renal insufficiency   Increased anion gap metabolic acidosis   Ileus   Pneumonia of left lower lobe due to infectious organism       ED Disposition  ED Disposition       ED Disposition   Decision to Admit    Condition   --    Comment   Level of Care: Telemetry [5]   Diagnosis: Ileus [211830]   Admitting Physician: VAZQUEZ ALARCON [[id number]]   Attending Physician: VAZQUEZ ALARCON [[id number]]   Is patient appropriate for Inpatient Observation Unit?: Yes [1]                 No follow-up provider specified.       Medication List      No changes were made to your  prescriptions during this visit.            Morris Guardado MD  05/12/25 0055

## 2025-05-12 NOTE — CASE MANAGEMENT/SOCIAL WORK
Discharge Planning Assessment  [facility]     Patient Name: Joselito Chowdary III  MRN: [id number]  Today's Date: 5/12/2025    Admit Date: 5/11/2025    Plan: Home   Discharge Needs Assessment       Row Name 05/12/25 1515       Living Environment    People in Home spouse    Name(s) of People in Home Lucy Chowdary Spouse [phone]    Current Living Arrangements home    Potentially Unsafe Housing Conditions none    In the past 12 months has the electric, gas, oil, or water company threatened to shut off services in your home? No    Primary Care Provided by self    Provides Primary Care For no one    Family Caregiver if Needed spouse    Family Caregiver Names Lucy Chowdary Spouse [phone]    Quality of Family Relationships helpful;involved;supportive    Able to Return to Prior Arrangements yes       Resource/Environmental Concerns    Resource/Environmental Concerns none    Transportation Concerns none       Transportation Needs    In the past 12 months, has lack of transportation kept you from medical appointments or from getting medications? no    In the past 12 months, has lack of transportation kept you from meetings, work, or from getting things needed for daily living? No       Food Insecurity    Within the past 12 months, you worried that your food would run out before you got the money to buy more. Never true    Within the past 12 months, the food you bought just didn't last and you didn't have money to get more. Never true       Transition Planning    Patient/Family Anticipates Transition to home with family    Patient/Family Anticipated Services at Transition none    Transportation Anticipated family or friend will provide       Discharge Needs Assessment    Readmission Within the Last 30 Days no previous admission in last 30 days    Equipment Currently Used at Home bp cuff;pulse ox;glucometer    Concerns to be Addressed denies needs/concerns at this time    Do you want help finding or keeping work or a  job? I do not need or want help    Do you want help with school or training? For example, starting or completing job training or getting a high school diploma, GED or equivalent No    Anticipated Changes Related to Illness none    Equipment Needed After Discharge none                   Discharge Plan       Row Name 05/12/25 1516       Plan    Plan Home    Patient/Family in Agreement with Plan yes    Plan Comments CM spoke with patient at bedside regarding Dc planning. Patient resides in [facility] with his spouse. Patient is independent with ADL's, denies any DME. Patient denies any current home health or outpatient services. Patient has medical insurance, prescription coverage and is able to afford/obtain medications without difficulty. Patient has an advanced directives. Patient denies any discharge planning needs at this time. Plan is home. CM will continue to follow    Final Discharge Disposition Code 01 - home or self-care                       Demographic Summary       Row Name 05/12/25 1518       General Information    Arrived From home    Referral Source emergency department    Reason for Consult discharge planning    Preferred Language English       Contact Information    Contact Information Comments Lucy Chowdary Spouse [phone]                   Functional Status       Row Name 05/12/25 1515       Functional Status    Usual Activity Tolerance good    Current Activity Tolerance moderate       Physical Activity    On average, how many days per week do you engage in moderate to strenuous exercise (like a brisk walk)? 0 days    On average, how many minutes do you engage in exercise at this level? 0 min    Number of minutes of exercise per week 0       Assessment of Health Literacy    How often do you have someone help you read hospital materials? Never    How often do you have problems learning about your medical condition because of difficulty understanding written information? Never    How often do you  have a problem understanding what is told to you about your medical condition? Never    How confident are you filling out medical forms by yourself? Quite a bit    Health Literacy Good       Functional Status, IADL    Medications independent    Meal Preparation independent    Housekeeping independent    Laundry independent    Shopping independent    If for any reason you need help with day-to-day activities such as bathing, preparing meals, shopping, managing finances, etc., do you get the help you need? I get all the help I need       Mental Status    General Appearance WDL WDL       Mental Status Summary    Recent Changes in Mental Status/Cognitive Functioning no changes       Employment/    Employment Status employed full-time                   Psychosocial    No documentation.                  Abuse/Neglect    No documentation.                  Legal    No documentation.                  Substance Abuse    No documentation.                  Patient Forms    No documentation.                     Magnolia Ospina RN

## 2025-05-12 NOTE — PROGRESS NOTES
[facility] Medicine Services  PROGRESS NOTE    Patient Name: Joselito Chowdary III  : 1960  MRN: [id number]    Date of Admission: 2025  Primary Care Physician: Melissa Guerra APRN    Subjective   Subjective     CC:  Ileus    HPI:  Seen after small bowel follow-through, final report pending.  Had bowel movements this morning      Objective   Objective     Vital Signs:   Temp:  [97.8 °F (36.6 °C)-98.5 °F (36.9 °C)] 97.9 °F (36.6 °C)  Heart Rate:  [] 94  Resp:  [18] 18  BP: (135-160)/(45-72) 160/72  Flow (L/min) (Oxygen Therapy):  [1-2] (P) 2     Physical Exam:  Constitutional: No acute distress, awake, alert  HENT: NCAT, mucous membranes moist  Respiratory: Respiratory effort normal   Musculoskeletal: No bilateral ankle edema  Psychiatric: Appropriate affect, cooperative  Neurologic: Oriented x 3, speech clear  Skin: No rashes  Okay    Results Reviewed:  LAB RESULTS:      Lab 25  0416 25  0332 25  2313 25  2124 25  0511 05/10/25  1242 25  1016 25  0844   WBC 4.02 3.66  --  4.38 4.15 4.27  --  9.05   HEMOGLOBIN 13.1 14.5  --  14.9 14.5 16.4  --  17.5   HEMATOCRIT 38.0 43.1  --  42.8 41.9 47.8  --  51.5*   PLATELETS 291 308  --  288 243 248  --  245   NEUTROS ABS 2.09  --   --  2.72 2.10 2.81  --  6.97   IMMATURE GRANS (ABS) 0.05  --   --   --   --  0.02  --  0.01   LYMPHS ABS 0.93  --   --   --   --  0.60*  --  1.06   MONOS ABS 0.77  --   --   --   --  0.81  --  0.76   EOS ABS 0.15  --   --  0.00 0.05 0.02  --  0.23   MCV 85.8 86.4  --  85.3 85.0 85.2  --  85.4   CRP  --   --   --   --   --   --   --  1.88*   LACTATE  --   --   --  2.0  --  2.0  --  1.7   HSTROP T  --   --  17 26*  --   --  15 16         Lab 25  0416 25  1555 25  0332 25  2124 25  0511 05/10/25  1320   SODIUM 134*  --  131* 130* 132* 132*   POTASSIUM 3.7 3.3* 3.3* 3.8 3.5 3.6   CHLORIDE 100  --  96* 93* 97* 97*   CO2 21.0*  --   18.0* 18.0* 21.0* 22.6   ANION GAP 13.0  --  17.0* 19.0* 14.0 12.4   BUN 15  --  24* 24* 16 12   CREATININE 0.79  --  1.15 1.30* 1.02 1.05   EGFR 99.8  --  71.5 61.7 82.6 79.8   GLUCOSE 194*  --  313* 375* 270* 342*   CALCIUM 7.8*  --  7.7* 7.7* 7.6* 7.3*   MAGNESIUM 2.1  --  2.0  --   --  1.8   PHOSPHORUS 1.2*  --   --   --   --   --    HEMOGLOBIN A1C  --   --   --   --  6.69*  --          Lab 05/13/25  0416 05/11/25 2124 05/11/25  0511 05/10/25  1320 05/08/25  0844   TOTAL PROTEIN 5.2* 5.5* 5.4* 5.3* 7.3   ALBUMIN 2.4* 2.9* 3.0* 3.0* 4.3   GLOBULIN 2.8 2.6 2.4 2.3 3.0   ALT (SGPT) 17 26 33 42* 50*   AST (SGOT) 12 11 15 35 71*   BILIRUBIN 0.5 1.0 1.0 1.0 0.6   ALK PHOS 50 56 54 56 88   LIPASE  --  24  --  12* 25         Lab 05/11/25  2313 05/11/25 2124 05/08/25  1016 05/08/25  0844   PROBNP  --   --   --  65.5   HSTROP T 17 26* 15 16                 Lab 05/11/25  2328   PH, ARTERIAL 7.396   PCO2, ARTERIAL 31.2*   PO2 ART 72.5*   FIO2 21   HCO3 ART 19.1*   BASE EXCESS ART -4.6*   CARBOXYHEMOGLOBIN 1.3     Brief Urine Lab Results  (Last result in the past 365 days)        Color   Clarity   Blood   Leuk Est   Nitrite   Protein   CREAT   Urine HCG        05/11/25 2138 Dark Yellow   Clear   Negative   Negative   Negative   30 mg/dL (1+)                   Microbiology Results Abnormal       None            XR Abdomen KUB  Result Date: 5/12/2025  XR ABDOMEN KUB Date of Exam: 5/12/2025 1:27 AM EDT Indication: ng tube placement Comparison: CT abdomen pelvis 5/11/2025 Findings: NG tube tip is present near the fundus of the stomach. Cholecystectomy clips are present. There are some gas-filled small bowel loops in the upper abdomen.     Impression: NG tube tip near the fundus of the stomach. Electronically Signed: Marcos Koroma MD  5/12/2025 3:37 AM EDT  Workstation ID: [id number]    CT Abdomen Pelvis With Contrast  Result Date: 5/12/2025  CT ABDOMEN PELVIS W CONTRAST Date of Exam: 5/11/2025 10:46 PM EDT Indication:  abdominal pain/nausea and vomiting. Comparison: CT abdomen pelvis 5/10/2025 Technique: Axial CT images were obtained of the abdomen and pelvis following the uneventful intravenous administration of 85 mL Isovue-300. Reconstructed coronal and sagittal images were also obtained. Automated exposure control and iterative construction methods were used. Findings: Lung Bases:   There is airspace disease in the medial left lower lobe with an air bronchogram. This could be atelectasis or pneumonia. There is fluid in the distal esophagus which is likely from reflux. Liver: Liver is normal in size and CT density. No focal lesions. Biliary/Gallbladder:  The gallbladder is surgically absent. The biliary tree is nondilated. Spleen: Spleen is normal in size and CT density. Pancreas:  Pancreas is normal. There is no evidence of pancreatic mass or peripancreatic fluid. Kidneys:  Kidneys are normal in size. There are no stones or hydronephrosis. Adrenals:  Adrenal glands are unremarkable. Retroperitoneal/Lymph Nodes/Vasculature:  No retroperitoneal adenopathy is identified. Gastrointestinal/Mesentery:  There is gastric distention. There is distention of the small bowel to the level of the right lower quadrant. The distalmost small bowel is decompressed but there is no discrete transition point, rather a more gradual change to the level of the colon. The entirety of the colon is normal. Findings are most likely secondary to adynamic ileus. Bladder:  The bladder is normal. Genital:   Unremarkable       Bony Structures:   Visualized bony structures are consistent with the patient's age.     Impression: Impression: 1.Gastric distention and small bowel distention to the level of the right lower quadrant. The distal small bowel is decompressed but there is no discrete transition point. Findings are most likely secondary to adynamic ileus. 2.Left lower lobe airspace disease could be atelectasis or pneumonia. Electronically Signed: Ethan  MD Jaquan  5/12/2025 12:19 AM EDT  Workstation ID: [id number]    XR Chest 1 View  Result Date: 5/11/2025  XR CHEST 1 VW Date of Exam: 5/11/2025 9:27 PM EDT Indication: dyspnea Comparison: Chest radiograph 7/12/2021 Findings: There are no airspace consolidations. No pleural fluid. No pneumothorax. The pulmonary vasculature appears within normal limits. The cardiac and mediastinal silhouette appear unremarkable. No acute osseous abnormality identified.     Impression: Impression: No active disease. Electronically Signed: Ethan Partida MD  5/11/2025 10:02 PM EDT  Workstation ID: [id number]      Results for orders placed during the hospital encounter of 07/12/21    Adult Transthoracic Echo Complete W/ Cont if Necessary Per Protocol    Interpretation Summary  · Normal LV systolic function  · Mild MR      Current medications:  Scheduled Meds:aspirin, 81 mg, Oral, Daily  [Held by provider] bumetanide, 1 mg, Oral, Daily  buPROPion XL, 300 mg, Oral, Daily  empagliflozin, 25 mg, Oral, QAM  FLUoxetine, 20 mg, Oral, Daily  heparin (porcine), 5,000 Units, Subcutaneous, Q8H  losartan, 100 mg, Oral, Q24H   And  [Held by provider] hydroCHLOROthiazide, 25 mg, Oral, Q24H  insulin glargine, 25 Units, Subcutaneous, Daily  insulin regular, 2-9 Units, Subcutaneous, Q6H  lamoTRIgine, 300 mg, Oral, Daily  [Held by provider] metFORMIN ER, 1,000 mg, Oral, QAM   And  [Held by provider] metFORMIN ER, 1,000 mg, Oral, Nightly  metoclopramide, 10 mg, Intravenous, Q6H  pantoprazole, 40 mg, Intravenous, Q AM  potassium phosphate, 15 mmol, Intravenous, Q3H  pravastatin, 40 mg, Oral, Daily  sodium chloride, 10 mL, Intravenous, Q12H  [Held by provider] sucralfate, 1 g, Oral, 4x Daily      Continuous Infusions:lactated ringers, 100 mL/hr, Last Rate: 100 mL/hr (05/13/25 1339)      PRN Meds:.  acetaminophen **OR** acetaminophen **OR** acetaminophen    Albuterol Sulfate NEB Orderable    senna-docusate sodium **AND** polyethylene glycol **AND** bisacodyl  **AND** bisacodyl    Calcium Replacement - Follow Nurse / BPA Driven Protocol    dextrose    dextrose    glucagon (human recombinant)    Magnesium Standard Dose Replacement - Follow Nurse / BPA Driven Protocol    Morphine **AND** naloxone    Phosphorus Replacement - Follow Nurse / BPA Driven Protocol    Potassium Replacement - Follow Nurse / BPA Driven Protocol    Sodium Chloride (PF)    [COMPLETED] Insert Peripheral IV **AND** sodium chloride    sodium chloride    sodium chloride    Assessment & Plan   Assessment & Plan     Active Hospital Problems    Diagnosis  POA    **Ileus [K56.7]  Yes    Anxiety [F41.9]  Yes    Dyslipidemia [E78.5]  Yes    Essential hypertension [I10]  Yes    Severe obstructive sleep apnea [G47.33]  Yes    Type 2 diabetes mellitus [E11.9]  Yes      Resolved Hospital Problems   No resolved problems to display.        Brief Hospital Course to date:  Joselito Chowdary III is a 65 y.o. male with history of HTN, T2DM, HLD, GT admitted with persistent abd pain and vomiting since 5/8/2025 requiring multiple ER visits.  CT abdomen from this admission 5/11/2025 concerning for small bowel obstruction versus ileus. General surgery evaluated, recommending non-op management for now.     Abdominal pain and vomiting  Ileus, possible diabetic vs post-infectious motility impairment  -Precipitated by food poisoning/gastroenteritis.  Likely with a component of diabetic gastroparesis    -CT abdomen is concerning for ileus versus bowel obstruction  -Continue NG tube and decompression  - Tentative plan for Small Bowel Follow-Through (SBFT), final report pending  - GI PCR panel negative    -Continue Reglan, IVF, anti-emetics       МАРИЯ   Hyponatremia  Metabolic acidosis  -improved     Type 2 diabetes  -Recent A1c 6.6%  -basal insulin and SSI    Hypertension  Dyslipidemia  -Continue losartan  -Hold hydrochlorothiazide  -Continue statin    GT       Expected Discharge Location and Transportation: Home  Expected  Discharge   Expected Discharge Date: 5/14/2025; Expected Discharge Time:      VTE Prophylaxis:  Pharmacologic VTE prophylaxis orders are present.         AM-PAC 6 Clicks Score (PT): 21 (05/13/25 0819)    CODE STATUS:   Code Status and Medical Interventions: CPR (Attempt to Resuscitate); Full Support   Ordered at: 05/12/25 0113     Code Status (Patient has no pulse and is not breathing):    CPR (Attempt to Resuscitate)     Medical Interventions (Patient has pulse or is breathing):    Full Support       Earnestine Proctor,   05/13/25

## 2025-05-12 NOTE — CONSULTS
General Surgery Consultation Note    Date of Service: 5/12/2025  Joselito Chowdary III  [id number]  1960      Referring Provider: Natalie Webb MD    Location of Consult: [facility]     Reason for Consultation: ileus, possible mechanical obstruction       History of Present Illness:  I am seeing, Joselito Chowdary III, in consultation for Natalie Webb MD regarding possible mechanical obstruction.    Mr. Joselito Chowdary III is a pleasant 65 year old gentleman with HTN, T2DM, HLD, GT presenting with recurrent abdominal pain, nausea, vomiting, and distension after two recent presentations at [facility]. He originally developed symptoms on 5/8 which he attributed to gastroenteritis from food poisoning. During his previous two visits, his symptoms resolved with medical management but recurred immediately after discharge. Since d/c yesterday, he had recurrent nausea, vomiting, anorexia, and malaise. He has continued to pass gas and liquid stool. His last bowel movement was today. He is passing gas. Continues to feel bloated despite NGT insertion with ~2.5 L of bilious output since admission. CT A/P showed evidence of likely adynamic ileus. There is decompressed distal ileum, but no clear transition point with tapering of the bowel in this area. Due to his recurrent symtoms, I was called for surgical opinion.     He does have a history of laparoscopic cholecystectomy.     Problems Addressed this Visit       * (Principal) Ileus     Other Visit Diagnoses         Intractable nausea and vomiting    -  Primary      Acute renal insufficiency        Relevant Medications    bumetanide (BUMEX) tablet 1 mg    hydroCHLOROthiazide tablet 25 mg      Increased anion gap metabolic acidosis          Pneumonia of left lower lobe due to infectious organism        Relevant Medications    albuterol (PROVENTIL) nebulizer solution 0.083% 2.5 mg/3mL          Diagnoses         Codes Comments      Intractable nausea and vomiting    -   Primary ICD-10-CM: R11.2  ICD-9-CM: 536.2       Acute renal insufficiency     ICD-10-CM: N28.9  ICD-9-CM: 593.9       Increased anion gap metabolic acidosis     ICD-10-CM: E87.29  ICD-9-CM: 276.2       Ileus     ICD-10-CM: K56.7  ICD-9-CM: 560.1       Pneumonia of left lower lobe due to infectious organism     ICD-10-CM: J18.9  ICD-9-CM: 486             Past Medical History:   Diagnosis Date    Acromioclavicular separation 04/2018    Same as above problem    Bipolar 1 disorder     Diabetes mellitus     Dislocation, shoulder 04/2018    Same    Diverticulitis     Hyperlipidemia     Hypertension     Knee swelling     On first visit    Mood disorder     Respiratory infection     Rotator cuff syndrome 04/2018    R Shoulder       Past Surgical History:    CARDIAC CATHETERIZATION    CARDIAC CATHETERIZATION    Procedure: Left Heart Cath;  Surgeon: Lois Levy MD;  Location: [facility] CATH INVASIVE LOCATION;  Service: Cardiology;  Laterality: N/A;    CHOLECYSTECTOMY    COLONOSCOPY    GALLBLADDER SURGERY    SHOULDER SURGERY    REPAIR     TRIGGER POINT INJECTION    First and subsequent visits       Allergies   Allergen Reactions    Atorvastatin Other (See Comments)     Muscle pain    Zestoretic [Lisinopril-Hydrochlorothiazide] Cough       Current Facility-Administered Medications on File Prior to Encounter   Medication Dose Route Frequency Provider Last Rate Last Admin    [DISCONTINUED] acetaminophen (TYLENOL) tablet 650 mg  650 mg Oral Q4H PRN Miki Garrido APRN   650 mg at 05/10/25 2010    [DISCONTINUED] atorvastatin (LIPITOR) tablet 20 mg  20 mg Oral Nightly Miki Garrido APRN   20 mg at 05/10/25 2006    [DISCONTINUED] buPROPion XL (WELLBUTRIN XL) 24 hr tablet 300 mg  300 mg Oral Daily Miki Garrido APRN        [DISCONTINUED] Calcium Replacement - Follow Nurse / BPA Driven Protocol   Not Applicable PRN Miki Garrido APRN        [DISCONTINUED] dextrose (D50W) (25 g/50 mL) IV injection 25 g   25 g Intravenous Q15 Min PRN Miki Garrido APRN        [DISCONTINUED] dextrose (GLUTOSE) oral gel 15 g  15 g Oral Q15 Min PRN Miki Garrido APRN        [DISCONTINUED] FLUoxetine (PROzac) capsule 20 mg  20 mg Oral Daily Miki Garrido APRN   20 mg at 05/11/25 1004    [DISCONTINUED] glucagon (GLUCAGEN) injection 1 mg  1 mg Intramuscular Q15 Min PRN Miki Garrido APRN        [DISCONTINUED] hydrALAZINE (APRESOLINE) injection 10 mg  10 mg Intravenous Q6H PRN Miki Garrido APRN        [DISCONTINUED] hydroCHLOROthiazide tablet 25 mg  25 mg Oral Daily Miki Garrido APRN   25 mg at 05/11/25 1004    [DISCONTINUED] insulin regular (humuLIN R,novoLIN R) injection 2-9 Units  2-9 Units Subcutaneous Q6H Miki Garrido APRN   6 Units at 05/11/25 0616    [DISCONTINUED] lamoTRIgine (LaMICtal) tablet 150 mg  150 mg Oral Q12H Miki Garrido APRN   150 mg at 05/11/25 1004    [DISCONTINUED] losartan (COZAAR) tablet 100 mg  100 mg Oral Q24H Miki Garrido APRN   100 mg at 05/11/25 1004    [DISCONTINUED] Magnesium Standard Dose Replacement - Follow Nurse / BPA Driven Protocol   Not Applicable PRN Miki Garrido APRN        [DISCONTINUED] morphine injection 2 mg  2 mg Intravenous Q4H PRN Matty Llamas MD   2 mg at 05/11/25 0024    [DISCONTINUED] ondansetron (ZOFRAN) injection 4 mg  4 mg Intravenous Q6H PRN Miki Garrido APRN   4 mg at 05/11/25 0321    [DISCONTINUED] Phosphorus Replacement - Follow Nurse / BPA Driven Protocol   Not Applicable PRN Miki Garrido APRN        [DISCONTINUED] Potassium Replacement - Follow Nurse / BPA Driven Protocol   Not Applicable PRN Miki Garrido APRN        [DISCONTINUED] scopolamine patch 1 mg/72 hr  1 patch Transdermal Q72H Miki Garrido APRN   1 patch at 05/10/25 1625    [DISCONTINUED] sodium chloride 0.9 % flush 10 mL  10 mL Intravenous PRN Matty Llamas MD        [DISCONTINUED] sodium chloride 0.9 % flush 10 mL  10 mL  Intravenous Q12H Miki Garrido APRN   10 mL at 05/11/25 0918    [DISCONTINUED] sodium chloride 0.9 % flush 10 mL  10 mL Intravenous PRN Miki Garrido APRN        [DISCONTINUED] sodium chloride 0.9 % infusion 40 mL  40 mL Intravenous PRN Miki Garrido APRN         Current Outpatient Medications on File Prior to Encounter   Medication Sig Dispense Refill    albuterol (PROAIR RESPICLICK) 108 (90 Base) MCG/ACT inhaler Inhale 2 puffs Every 4 (Four) Hours As Needed for Wheezing or Shortness of Air. 1 each 0    ammonium lactate (LAC-HYDRIN) 12 % lotion Apply 1 Application topically to the appropriate area as directed 2 (Two) Times a Day. 400 g 1    aspirin 81 MG EC tablet Take 1 tablet by mouth Daily.      B-D ULTRAFINE III SHORT PEN 31G X 8 MM misc USE AS DIRECTED TO INJECT MEDICATION 4 TIMES DAILY      bumetanide (BUMEX) 1 MG tablet bumetanide 1 mg tablet   Take 1 tablet twice a day for 3 days, then take 1 tablet daily      bumetanide (BUMEX) 1 MG tablet Take 1 tablet by mouth Daily. 90 tablet 0    buPROPion XL (Wellbutrin XL) 300 MG 24 hr tablet Take 1 tablet by mouth Daily. 30 tablet 5    empagliflozin (Jardiance) 25 MG tablet tablet Take 1 tablet by mouth Every Morning. 90 tablet 0    FLUoxetine (PROzac) 20 MG capsule Take 1 capsule by mouth Daily. 30 capsule 5    glucose blood (Accu-Chek Guide Test) test strip Use As Directed 3 times daily 300 each 1    insulin aspart (NovoLOG FlexPen) 100 UNIT/ML solution pen-injector sc pen Inject up to 42 units under the skin into the appropriate area in the morning before breakfast, 20 units before lunch/snack, & up to 65 units before dinner. (Plus 2 units to prime needle 3 times daily) 120 mL 0    Insulin Degludec (Tresiba FlexTouch) 200 UNIT/ML solution pen-injector pen injection Inject 50 units on one side of abdomen and 50 units on other side of abdomen at night (Total of 100 units) plus 2 units per injection to prime needle (max daily dose of 104 units) 18  "mL 4    Insulin Glargine (BASAGLAR KWIKPEN SC) Inject 50 Units into the appropriate muscle as directed by prescriber Daily.      Insulin Pen Needle (Pen Needles 3/16\") 31G X 5 MM misc Use As Directed four times per day 400 each 1    Jardiance 25 MG tablet tablet Take 1 tablet by mouth Every Morning.      lamoTRIgine (LaMICtal) 150 MG tablet Take 2 tablets by mouth Daily. 60 tablet 5    losartan (COZAAR) 100 MG tablet TAKE 1 TABLET BY MOUTH EVERY DAY WITH HYDROCHLOROTHIAZIDE 25 MG TABLET      losartan-hydrochlorothiazide (HYZAAR) 100-25 MG per tablet Take 1 tablet by mouth Daily. 90 tablet 1    metFORMIN ER (GLUCOPHAGE-XR) 500 MG 24 hr tablet Take 2 tablets by mouth Every Morning AND 2 tablets Every Night. 360 tablet 0    Mounjaro 7.5 MG/0.5ML solution pen-injector pen Inject 0.5 mL under the skin into the appropriate area as directed 1 (One) Time Per Week. 2 mL 3    NOVOLOG FLEXPEN 100 UNIT/ML solution pen-injector sc pen       ondansetron ODT (ZOFRAN-ODT) 8 MG disintegrating tablet As Needed.      ondansetron ODT (ZOFRAN-ODT) 8 MG disintegrating tablet Place 1 tablet on the tongue Every 8 (Eight) Hours As Needed for Nausea or Vomiting. 20 tablet 0    pantoprazole (PROTONIX) 40 MG EC tablet Take 1 tablet by mouth Daily. 30 tablet 0    pravastatin (PRAVACHOL) 40 MG tablet Take 1 tablet every day by oral route for 90 days. 90 tablet 1    sucralfate (CARAFATE) 1 g tablet Take 1 tablet by mouth 4 (Four) Times a Day. 120 tablet 0    Tirzepatide 7.5 MG/0.5ML solution auto-injector Inject 0.5 mL under the skin into the appropriate area as directed Every 7 (Seven) Days. 2 mL 2    Tresiba FlexTouch 200 UNIT/ML solution pen-injector pen injection INJECT 50 UNITS UNDER THE SKIN ON ONE SIDE OF ABDOMEN AND 50 UNITS ON OTHER SIDE OF ABDOMEN EVERY NIGHT, PLUS 2 UNITS PER INJECTION TO PRIME      Tresiba FlexTouch 200 UNIT/ML solution pen-injector pen injection Inject 50 units on one side of abdomen & 50 units on other side of " abdomen at night (total 100 units) plus 2 units per injection to prime needle (max daily dose: 104 units) 18 mL 4    Tresiba FlexTouch 200 UNIT/ML solution pen-injector pen injection Inject 50 units under the skin into the appropriate area as directed once daily; plus 2 units per injection to prime needle 18 mL 4         Current Facility-Administered Medications:     acetaminophen (TYLENOL) tablet 650 mg, 650 mg, Oral, Q4H PRN **OR** acetaminophen (TYLENOL) 160 MG/5ML oral solution 650 mg, 650 mg, Oral, Q4H PRN **OR** acetaminophen (TYLENOL) suppository 650 mg, 650 mg, Rectal, Q4H PRN, Juliocesar Weinstein MD    albuterol (PROVENTIL) nebulizer solution 0.083% 2.5 mg/3mL, 2.5 mg, Nebulization, Q4H PRN, Juliocesar Weinstein MD    aspirin EC tablet 81 mg, 81 mg, Oral, Daily, Natalie Webb MD    sennosides-docusate (PERICOLACE) 8.6-50 MG per tablet 2 tablet, 2 tablet, Oral, BID PRN **AND** polyethylene glycol (MIRALAX) packet 17 g, 17 g, Oral, Daily PRN **AND** bisacodyl (DULCOLAX) EC tablet 5 mg, 5 mg, Oral, Daily PRN **AND** bisacodyl (DULCOLAX) suppository 10 mg, 10 mg, Rectal, Daily PRN, Juliocesar Weinstein MD    [Held by provider] bumetanide (BUMEX) tablet 1 mg, 1 mg, Oral, Daily, Juliocesar Weinstein MD    buPROPion XL (WELLBUTRIN XL) 24 hr tablet 300 mg, 300 mg, Oral, Daily, Natalie Webb MD    Calcium Replacement - Follow Nurse / BPA Driven Protocol, , Not Applicable, PRN, Juliocesar Weinstein MD    dextrose (D50W) (25 g/50 mL) IV injection 25 g, 25 g, Intravenous, Q15 Min PRN, Juliocesar Weinstein MD    dextrose (GLUTOSE) oral gel 15 g, 15 g, Oral, Q15 Min PRN, Juliocesar Weinstein MD    empagliflozin (JARDIANCE) tablet 25 mg, 25 mg, Oral, Tracey THOMPSON Mohamed Ahmed, MD    FLUoxetine (PROzac) capsule 20 mg, 20 mg, Oral, Daily, Natalie Webb MD    glucagon (GLUCAGEN) injection 1 mg, 1 mg, Intramuscular, Q15 Min PRN, Juliocesar Weinstein MD    heparin (porcine) 5000 UNIT/ML injection 5,000 Units, 5,000 Units,  Subcutaneous, Q8H, Juliocesar Weinstein MD, 5,000 Units at 05/12/25 1433    losartan (COZAAR) tablet 100 mg, 100 mg, Oral, Q24H **AND** [Held by provider] hydroCHLOROthiazide tablet 25 mg, 25 mg, Oral, Q24H, Juliocesar Weinstein MD    insulin glargine (LANTUS, SEMGLEE) injection 25 Units, 25 Units, Subcutaneous, Daily, Juliocesar Weinstein MD, 25 Units at 05/12/25 0808    insulin regular (humuLIN R,novoLIN R) injection 2-9 Units, 2-9 Units, Subcutaneous, Q6H, Juliocesar Weinstein MD, 4 Units at 05/12/25 1123    lactated ringers infusion, 100 mL/hr, Intravenous, Continuous, Juliocesar Weinstein MD, Last Rate: 100 mL/hr at 05/12/25 1521, 100 mL/hr at 05/12/25 1521    lamoTRIgine (LaMICtal) tablet 300 mg, 300 mg, Oral, Daily, Natalie Webb MD    Magnesium Standard Dose Replacement - Follow Nurse / BPA Driven Protocol, , Not Applicable, PRN, Juliocesar Weinstein MD    [Held by provider] metFORMIN ER (GLUCOPHAGE-XR) 24 hr tablet 1,000 mg, 1,000 mg, Oral, QAM **AND** [Held by provider] metFORMIN ER (GLUCOPHAGE-XR) 24 hr tablet 1,000 mg, 1,000 mg, Oral, Nightly, Juliocesar Weinstein MD    metoclopramide (REGLAN) injection 10 mg, 10 mg, Intravenous, Q6H, Natalie Webb MD, 10 mg at 05/12/25 1433    morphine injection 1 mg, 1 mg, Intravenous, Q4H PRN **AND** naloxone (NARCAN) injection 0.4 mg, 0.4 mg, Intravenous, Q5 Min PRN, Juliocesar Weinstein MD    pantoprazole (PROTONIX) injection 40 mg, 40 mg, Intravenous, Q AM, Juliocesar Weinstein MD, 40 mg at 05/12/25 0556    Phosphorus Replacement - Follow Nurse / BPA Driven Protocol, , Not Applicable, PRN, Juliocesar Weinstein MD    Potassium Replacement - Follow Nurse / BPA Driven Protocol, , Not Applicable, PRN, Juliocesar Weinstein MD    pravastatin (PRAVACHOL) tablet 40 mg, 40 mg, Oral, Daily, Natalie Webb MD    Sodium Chloride (PF) 0.9 % 10 mL, 10 mL, Intravenous, PRN, Morris Guardado MD    [COMPLETED] Insert Peripheral IV, , , Once **AND** sodium chloride 0.9 % flush 10 mL,  "10 mL, Intravenous, PRN, Morris Guardado MD    sodium chloride 0.9 % flush 10 mL, 10 mL, Intravenous, Q12H, Juliocesar Weinstein MD, 10 mL at 25 0809    sodium chloride 0.9 % flush 10 mL, 10 mL, Intravenous, PRN, Juliocesar Weinstein MD    sodium chloride 0.9 % infusion 40 mL, 40 mL, Intravenous, PRN, Juliocesar Weinstein MD    [Held by provider] sucralfate (CARAFATE) tablet 1 g, 1 g, Oral, 4x Daily, Juliocesar Weinstein MD    Family History   Problem Relation Age of Onset    Cancer Mother     Hypertension Mother     Deep vein thrombosis Mother     Diabetes Father     Heart disease Father     Hypertension Father     COPD Father     Heart failure Father      Social History     Socioeconomic History    Marital status:    Tobacco Use    Smoking status: Former     Current packs/day: 0.00     Average packs/day: 1 pack/day for 10.0 years (10.0 ttl pk-yrs)     Types: Cigarettes     Start date: 1978     Quit date: 1986     Years since quittin.3    Smokeless tobacco: Never   Vaping Use    Vaping status: Never Used   Substance and Sexual Activity    Alcohol use: Not Currently     Comment: Occasional drink    Drug use: Never    Sexual activity: Not Currently     Partners: Female     Birth control/protection: None       Review of Systems:  Per HPI, otherwise the 12 point review of systems is negative.    /65 (BP Location: Right arm, Patient Position: Lying)   Pulse 88   Temp 98.1 °F (36.7 °C) (Oral)   Resp 18   Ht 177.8 cm (70\")   Wt 129 kg (285 lb)   SpO2 97%   BMI 40.89 kg/m²   Body mass index is 40.89 kg/m².    General: alert, oriented x 3, well-appearing, no acute distress  HEENT: normocephalic, atraumatic, sclerae anicteric, external ears normal   Cardiovascular: regular rate and regular rhythm  Pulmonary: breathing comfortably on nasal cannula, no respiratory distress  Gastrointestinal: soft, mildly tender in the naresh-umbilical area, NGT in place with bilious output, distended, tympanitic, " no peritonitis, protuberant  Extremity: no clubbing, cyanosis,  edema   Neuro: cognitively intact, CN grossly intact, no focal deficits  Psych: appropriate mood and affect    CBC  Results from last 7 days   Lab Units 05/12/25  0332   WBC 10*3/mm3 3.66   HEMOGLOBIN g/dL 14.5   HEMATOCRIT % 43.1   PLATELETS 10*3/mm3 308       CMP  Results from last 7 days   Lab Units 05/12/25  0332 05/11/25 2124   SODIUM mmol/L 131* 130*   POTASSIUM mmol/L 3.3* 3.8   CHLORIDE mmol/L 96* 93*   CO2 mmol/L 18.0* 18.0*   BUN mg/dL 24* 24*   CREATININE mg/dL 1.15 1.30*   CALCIUM mg/dL 7.7* 7.7*   BILIRUBIN mg/dL  --  1.0   ALK PHOS U/L  --  56   ALT (SGPT) U/L  --  26   AST (SGOT) U/L  --  11   GLUCOSE mg/dL 313* 375*       Radiology  Imaging Results (Last 72 Hours)       Procedure Component Value Units Date/Time    XR Abdomen KUB [[id number]] Collected: 05/12/25 0337     Updated: 05/12/25 0341    Narrative:      XR ABDOMEN KUB    Date of Exam: 5/12/2025 1:27 AM EDT    Indication: ng tube placement    Comparison: CT abdomen pelvis 5/11/2025    Findings:  NG tube tip is present near the fundus of the stomach. Cholecystectomy clips are present. There are some gas-filled small bowel loops in the upper abdomen.      Impression:      NG tube tip near the fundus of the stomach.        Electronically Signed: Marcos Koroma MD    5/12/2025 3:37 AM EDT    Workstation ID: [id number]    CT Abdomen Pelvis With Contrast [[id number]] Collected: 05/12/25 0015     Updated: 05/12/25 0022    Narrative:      CT ABDOMEN PELVIS W CONTRAST    Date of Exam: 5/11/2025 10:46 PM EDT    Indication: abdominal pain/nausea and vomiting.    Comparison: CT abdomen pelvis 5/10/2025    Technique: Axial CT images were obtained of the abdomen and pelvis following the uneventful intravenous administration of 85 mL Isovue-300. Reconstructed coronal and sagittal images were also obtained. Automated exposure control and iterative   construction methods were  used.      Findings:  Lung Bases:     There is airspace disease in the medial left lower lobe with an air bronchogram. This could be atelectasis or pneumonia. There is fluid in the distal esophagus which is likely from reflux.    Liver:  Liver is normal in size and CT density. No focal lesions.    Biliary/Gallbladder:    The gallbladder is surgically absent. The biliary tree is nondilated.    Spleen:  Spleen is normal in size and CT density.    Pancreas:    Pancreas is normal. There is no evidence of pancreatic mass or peripancreatic fluid.    Kidneys:    Kidneys are normal in size. There are no stones or hydronephrosis.    Adrenals:    Adrenal glands are unremarkable.    Retroperitoneal/Lymph Nodes/Vasculature:    No retroperitoneal adenopathy is identified.    Gastrointestinal/Mesentery:    There is gastric distention. There is distention of the small bowel to the level of the right lower quadrant. The distalmost small bowel is decompressed but there is no discrete transition point, rather a more gradual change to the level of the colon.   The entirety of the colon is normal. Findings are most likely secondary to adynamic ileus.    Bladder:    The bladder is normal.    Genital:     Unremarkable          Bony Structures:     Visualized bony structures are consistent with the patient's age.        Impression:      Impression:  1.Gastric distention and small bowel distention to the level of the right lower quadrant. The distal small bowel is decompressed but there is no discrete transition point. Findings are most likely secondary to adynamic ileus.  2.Left lower lobe airspace disease could be atelectasis or pneumonia.            Electronically Signed: Ethan Partida MD    5/12/2025 12:19 AM EDT    Workstation ID: [id number]    XR Chest 1 View [[id number]] Collected: 05/11/25 2202     Updated: 05/11/25 2205    Narrative:      XR CHEST 1 VW    Date of Exam: 5/11/2025 9:27 PM EDT    Indication: dyspnea    Comparison: Chest  radiograph 7/12/2021    Findings:  There are no airspace consolidations. No pleural fluid. No pneumothorax. The pulmonary vasculature appears within normal limits. The cardiac and mediastinal silhouette appear unremarkable. No acute osseous abnormality identified.      Impression:      Impression:  No active disease.          Electronically Signed: Ethan Partida MD    5/11/2025 10:02 PM EDT    Workstation ID: [id number]            ASSESSMENT/PLAN:  Joselito Chowdary III is a 65 y.o. male presenting with likely adynamic ileus. On my independent review, CT A/P consistent with severe ileus with tapering of the ileum to decompressed loops. No evidence of perforation, pneumatosis, or high-grade mechanical obstruction. He continues to have bowel function. Given his clinical history, exam, and imaging, I favor the diagnosis of ileus. Recommend bowel decompression with NGT for at least 24 hours followed by small bowel follow through to prove lack of obstruction given distal decompressed bowel prior to using pro-motility medications.    Adynamic ileus  - recommend non-operative management  - NGT to LWS for decompression  - NPO, IVF  - Small amount of ice chips okay, record amount  - Will consider SBFT to confirm no mechanical obstruction tomorrow AM  - Appreciate hospitalist assistance      Hussein Gamboa MD  05/12/25  15:28 EDT

## 2025-05-12 NOTE — H&P
[facility] Medicine Services  HISTORY AND PHYSICAL    Patient Name: Joselito Chowdary III  : 1960  MRN: [id number]  Primary Care Physician: Melissa Guerra APRN  Date of admission: 2025      Subjective   Subjective     Chief Complaint:  Pain, nausea, vomiting    HPI:  Joselito Chowdary III is a 65 y.o. male with history of HTN, T2DM, HLD, GT who is presenting with abd pain and vomiting. He started having symptoms  after eating potentially spoiled food with workup showing gastroenteritis. He had recurrence of symptoms so came back to ED where he was found to have ileus and admitted to CDU for fluids, antiemetics. Improved with that and discharged, however symptoms returned shortly after going home yesterday so he presented here for evaluation.  He reports inability to take p.o. and continued vomiting.  He has also had small amounts of very soft stool, most recent was 2 hours ago, and passing gas.  Abdomen he feels very bloated.  He also reports 1 fever at home on  but none since then.  There is no blood in the vomit or stool.      Personal History     Past Medical History:   Diagnosis Date    Acromioclavicular separation 2018    Same as above problem    Bipolar 1 disorder     Diabetes mellitus     Dislocation, shoulder 2018    Same    Diverticulitis     Hyperlipidemia     Hypertension     Knee swelling     On first visit    Mood disorder     Respiratory infection     Rotator cuff syndrome 2018    R Shoulder           Past Surgical History:   Procedure Laterality Date    CARDIAC CATHETERIZATION      CARDIAC CATHETERIZATION N/A 2021    Procedure: Left Heart Cath;  Surgeon: Lois Levy MD;  Location: Merged with [facility] INVASIVE LOCATION;  Service: Cardiology;  Laterality: N/A;    CHOLECYSTECTOMY      COLONOSCOPY      GALLBLADDER SURGERY      SHOULDER SURGERY Right     REPAIR     TRIGGER POINT INJECTION      First and subsequent visits        Family History: family history includes COPD in his father; Cancer in his mother; Deep vein thrombosis in his mother; Diabetes in his father; Heart disease in his father; Heart failure in his father; Hypertension in his father and mother.     Social History:  reports that he quit smoking about 39 years ago. His smoking use included cigarettes. He started smoking about 47 years ago. He has a 10 pack-year smoking history. He has never used smokeless tobacco. He reports that he does not currently use alcohol. He reports that he does not use drugs.  Social History     Social History Narrative    WIFE, SLADE NOWAK, DIRECTOR OF [employer]        Medications:  Available home medication information reviewed.  FLUoxetine, Insulin Degludec, Insulin Glargine, Insulin Pen Needle, Pen Needles, Tirzepatide, albuterol, ammonium lactate, aspirin, buPROPion XL, bumetanide, empagliflozin, glucose blood, insulin aspart, lamoTRIgine, losartan, losartan-hydrochlorothiazide, metFORMIN ER, ondansetron ODT, pantoprazole, pravastatin, and sucralfate    Allergies   Allergen Reactions    Atorvastatin Other (See Comments)     Muscle pain    Zestoretic [Lisinopril-Hydrochlorothiazide] Cough       Objective   Objective     Vital Signs:   Temp:  [97.7 °F (36.5 °C)-98.8 °F (37.1 °C)] 98.7 °F (37.1 °C)  Heart Rate:  [] 96  Resp:  [16-22] 18  BP: (106-176)/() 176/120       Physical Exam   Awake alert oriented x 3  Resting in bed, slightly uncomfortable due to pain  Mucous membranes moist  Heart regular rate and rhythm  Lungs clear to auscultation bilaterally  Abdomen is distended and firm without pronounced areas of tenderness, there is no rebound guarding or rigidity  There is no peripheral edema    Result Review:  I have personally reviewed the results from the time of this admission to 5/12/2025 01:20 EDT and agree with these findings:  [x]  Laboratory list / accordion  []  Microbiology  [x]  Radiology  [x]  EKG/Telemetry   []   Cardiology/Vascular   []  Pathology  [x]  Old records  []  Other:  Most notable findings include: See assessment plan      LAB RESULTS:      Lab 05/11/25  2124 05/11/25  0511 05/10/25  1242 05/08/25  0844   WBC 4.38 4.15 4.27 9.05   HEMOGLOBIN 14.9 14.5 16.4 17.5   HEMATOCRIT 42.8 41.9 47.8 51.5*   PLATELETS 288 243 248 245   NEUTROS ABS 2.72 2.10 2.81 6.97   IMMATURE GRANS (ABS)  --   --  0.02 0.01   LYMPHS ABS  --   --  0.60* 1.06   MONOS ABS  --   --  0.81 0.76   EOS ABS 0.00 0.05 0.02 0.23   MCV 85.3 85.0 85.2 85.4   CRP  --   --   --  1.88*   LACTATE 2.0  --  2.0 1.7         Lab 05/11/25  2124 05/11/25  0511 05/10/25  1320 05/08/25  0844   SODIUM 130* 132* 132* 135*   POTASSIUM 3.8 3.5 3.6 3.4*   CHLORIDE 93* 97* 97* 99   CO2 18.0* 21.0* 22.6 22.2   ANION GAP 19.0* 14.0 12.4 13.8   BUN 24* 16 12 18   CREATININE 1.30* 1.02 1.05 1.01   EGFR 61.7 82.6 79.8 83.6   GLUCOSE 375* 270* 342* 239*   CALCIUM 7.7* 7.6* 7.3* 8.8   MAGNESIUM  --   --  1.8  --    HEMOGLOBIN A1C  --  6.69*  --   --          Lab 05/11/25  2124 05/11/25  0511 05/10/25  1320 05/08/25  0844   TOTAL PROTEIN 5.5* 5.4* 5.3* 7.3   ALBUMIN 2.9* 3.0* 3.0* 4.3   GLOBULIN 2.6 2.4 2.3 3.0   ALT (SGPT) 26 33 42* 50*   AST (SGOT) 11 15 35 71*   BILIRUBIN 1.0 1.0 1.0 0.6   ALK PHOS 56 54 56 88   LIPASE 24  --  12* 25         Lab 05/11/25 2313 05/11/25  2124 05/08/25  1016 05/08/25  0844   PROBNP  --   --   --  65.5   HSTROP T 17 26* 15 16                 Lab 05/11/25  2328   PH, ARTERIAL 7.396   PCO2, ARTERIAL 31.2*   PO2 ART 72.5*   FIO2 21   HCO3 ART 19.1*   BASE EXCESS ART -4.6*   CARBOXYHEMOGLOBIN 1.3     UA          5/8/2025    09:09 5/10/2025    14:32 5/11/2025    21:38   Urinalysis   Squamous Epithelial Cells, UA   None Seen    Specific [place], UA 1.020  1.010  1.028    Ketones, UA Negative  15 mg/dL (1+)  15 mg/dL (1+)    Blood, UA Negative  Negative  Negative    Leukocytes, UA Negative  Negative  Negative    Nitrite, UA Negative  Negative  Negative     RBC, UA   0-2    WBC, UA   3-5    Bacteria, UA   None Seen        Microbiology Results (last 10 days)       Procedure Component Value - Date/Time    COVID PRE-OP / PRE-PROCEDURE SCREENING ORDER (NO ISOLATION) - Swab, Nasopharynx [[id number]]  (Normal) Collected: 05/11/25 2144    Lab Status: Final result Specimen: Swab from Nasopharynx Updated: 05/11/25 2234    Narrative:      The following orders were created for panel order COVID PRE-OP / PRE-PROCEDURE SCREENING ORDER (NO ISOLATION) - Swab, Nasopharynx.  Procedure                               Abnormality         Status                     ---------                               -----------         ------                     Respiratory Panel PCR w/...[[id number]]  Normal              Final result                 Please view results for these tests on the individual orders.    Respiratory Panel PCR w/COVID-19(SARS-CoV-2) KEIRY/BRAIN/AMANDA/PAD/[facility] In-House, NP Swab in UTM/VTM, 2 HR TAT - Swab, Nasopharynx [[id number]]  (Normal) Collected: 05/11/25 2144    Lab Status: Final result Specimen: Swab from Nasopharynx Updated: 05/11/25 2234     ADENOVIRUS, PCR Not Detected     Coronavirus 229E Not Detected     Coronavirus HKU1 Not Detected     Coronavirus NL63 Not Detected     Coronavirus OC43 Not Detected     COVID19 Not Detected     Human Metapneumovirus Not Detected     Human Rhinovirus/Enterovirus Not Detected     Influenza A PCR Not Detected     Influenza B PCR Not Detected     Parainfluenza Virus 1 Not Detected     Parainfluenza Virus 2 Not Detected     Parainfluenza Virus 3 Not Detected     Parainfluenza Virus 4 Not Detected     RSV, PCR Not Detected     Bordetella pertussis pcr Not Detected     Bordetella parapertussis PCR Not Detected     Chlamydophila pneumoniae PCR Not Detected     Mycoplasma pneumo by PCR Not Detected    Narrative:      In the setting of a positive respiratory panel with a viral infection PLUS a negative procalcitonin without other underlying  concern for bacterial infection, consider observing off antibiotics or discontinuation of antibiotics and continue supportive care. If the respiratory panel is positive for atypical bacterial infection (Bordetella pertussis, Chlamydophila pneumoniae, or Mycoplasma pneumoniae), consider antibiotic de-escalation to target atypical bacterial infection.    Blood Culture - Blood, Arm, Left [[id number]]  (Normal) Collected: 05/10/25 1256    Lab Status: Preliminary result Specimen: Blood from Arm, Left Updated: 05/11/25 2016     Blood Culture No growth at 24 hours    Blood Culture - Blood, Arm, Right [[id number]]  (Normal) Collected: 05/10/25 1253    Lab Status: Preliminary result Specimen: Blood from Arm, Right Updated: 05/11/25 2016     Blood Culture No growth at 24 hours            CT Abdomen Pelvis With Contrast  Result Date: 5/12/2025  CT ABDOMEN PELVIS W CONTRAST Date of Exam: 5/11/2025 10:46 PM EDT Indication: abdominal pain/nausea and vomiting. Comparison: CT abdomen pelvis 5/10/2025 Technique: Axial CT images were obtained of the abdomen and pelvis following the uneventful intravenous administration of 85 mL Isovue-300. Reconstructed coronal and sagittal images were also obtained. Automated exposure control and iterative construction methods were used. Findings: Lung Bases:   There is airspace disease in the medial left lower lobe with an air bronchogram. This could be atelectasis or pneumonia. There is fluid in the distal esophagus which is likely from reflux. Liver: Liver is normal in size and CT density. No focal lesions. Biliary/Gallbladder:  The gallbladder is surgically absent. The biliary tree is nondilated. Spleen: Spleen is normal in size and CT density. Pancreas:  Pancreas is normal. There is no evidence of pancreatic mass or peripancreatic fluid. Kidneys:  Kidneys are normal in size. There are no stones or hydronephrosis. Adrenals:  Adrenal glands are unremarkable. Retroperitoneal/Lymph Nodes/Vasculature:   No retroperitoneal adenopathy is identified. Gastrointestinal/Mesentery:  There is gastric distention. There is distention of the small bowel to the level of the right lower quadrant. The distalmost small bowel is decompressed but there is no discrete transition point, rather a more gradual change to the level of the colon. The entirety of the colon is normal. Findings are most likely secondary to adynamic ileus. Bladder:  The bladder is normal. Genital:   Unremarkable       Bony Structures:   Visualized bony structures are consistent with the patient's age.     Impression: Impression: 1.Gastric distention and small bowel distention to the level of the right lower quadrant. The distal small bowel is decompressed but there is no discrete transition point. Findings are most likely secondary to adynamic ileus. 2.Left lower lobe airspace disease could be atelectasis or pneumonia. Electronically Signed: Ethan Partida MD  5/12/2025 12:19 AM EDT  Workstation ID: [id number]    XR Chest 1 View  Result Date: 5/11/2025  XR CHEST 1 VW Date of Exam: 5/11/2025 9:27 PM EDT Indication: dyspnea Comparison: Chest radiograph 7/12/2021 Findings: There are no airspace consolidations. No pleural fluid. No pneumothorax. The pulmonary vasculature appears within normal limits. The cardiac and mediastinal silhouette appear unremarkable. No acute osseous abnormality identified.     Impression: Impression: No active disease. Electronically Signed: Ethan Partida MD  5/11/2025 10:02 PM EDT  Workstation ID: [id number]    CT Angiogram Abdomen Pelvis  Result Date: 5/10/2025  CT ANGIOGRAM ABDOMEN PELVIS Date of Exam: 5/10/2025 2:15 PM EDT Indication: ABD pain. Comparison: CT abdomen and pelvis 5/8/2025 Technique: CTA of the abdomen and pelvis was performed after the uneventful intravenous administration of 100 mL Isovue-370. Reconstructed coronal and sagittal images were also obtained. In addition, a 3-D volume rendered image was created for  interpretation. Automated exposure control and iterative reconstruction methods were used. Findings: Unremarkable appearance of the lower thorax. Unremarkable appearance of the liver. The gallbladder is surgically absent. Normal appearance of the bile ducts. Normal size and appearance of the spleen. Unremarkable appearance of the pancreas. Normal appearance of the adrenal glands. Unremarkable appearance of the kidneys, ureters, bladder, prostate, and seminal vesicles. Normal appearance of the arterial vasculature. The vast majority of the small bowel demonstrate fluid and gas distention and dilatation, measuring up to 4.8 cm in diameter. There is no discrete transition point. There is fluid and unformed stool throughout the colon. There is gas distention of the right colon and transverse colon. No evidence of bowel ischemia or perforation. No abdominopelvic free fluid or fat stranding. No pneumoperitoneum. Unremarkable appearance of the body wall soft tissues. No lymphadenopathy. No acute or suspicious bony findings.     Impression: Impression: Fluid and gas dilatation and distention of the majority of the small bowel without evidence of discrete transition point. There is gaseous distention and fluid in the colon. The findings are suggestive of ileus. This has worsened/progressed compared to the prior CT. No evidence of bowel ischemia or perforation. Normal appearance of the arterial vasculature/aorta, unchanged from 2 days prior. Electronically Signed: Renato Rider MD  5/10/2025 3:30 PM EDT  Workstation ID: [id number]      Results for orders placed during the hospital encounter of 07/12/21    Adult Transthoracic Echo Complete W/ Cont if Necessary Per Protocol    Interpretation Summary  · Normal LV systolic function  · Mild MR      Assessment & Plan   Assessment & Plan       Ileus    Severe obstructive sleep apnea    Type 2 diabetes mellitus    Essential hypertension    Dyslipidemia    Anxiety      Abdominal pain  and vomiting  Ileus, possible diabetic vs post-infectious motility impairment  -Recurrence of symptoms, initially improved on antiemetics. CT shows .Gastric distention and small bowel distention to the level of the right lower quadrant. The distal small bowel is decompressed but there is no discrete transition point. Findings are most likely secondary to adynamic ileus. History of diabetes on multiple meds and hyperglycemic currently but A1c is 6.6. Lactate normal. Liver enzymes, lipase, bili normal.  Abdomen distended but nontender and he is still passing small amounts of stool.   - f/u stool infectious panel  - Place NG to low intermittent wall suction  - Continue Reglan, IV fluids, pain control  - Ensure bowel regimen    Elevated serum Cr   Hyponatremia  Metabolic acidosis  -in light of poor oral intake and vomiting. Continue IV fluids, electrolyte replacement. Monitor    T2DM  -Continue basal insulin with SSI for now. Reduced basal dose to 25u while npo and will advance as needed    HTN  -oral meds on hold while NPO    GT    VTE Prophylaxis:  Pharmacologic VTE prophylaxis orders are signed & held.            CODE STATUS:    Code Status and Medical Interventions: CPR (Attempt to Resuscitate); Full Support   Ordered at: 05/12/25 0113     Code Status (Patient has no pulse and is not breathing):    CPR (Attempt to Resuscitate)     Medical Interventions (Patient has pulse or is breathing):    Full Support       Expected Discharge   Expected discharge date/ time has not been documented.     Juliocesar Weinstein MD  05/12/25

## 2025-05-12 NOTE — PLAN OF CARE
Goal Outcome Evaluation:  Plan of Care Reviewed With: spouse, patient, child        Progress: no change  Outcome Evaluation: Pt on room air. NSR on monitor. No complaints of abdominal pain or nausea today. One incontinent bowel movement this am but unable to obtain specimen. NG to low wall suction with 750 ml output this shift. Family at bedside. VSS

## 2025-05-13 ENCOUNTER — APPOINTMENT (OUTPATIENT)
Dept: GENERAL RADIOLOGY | Facility: HOSPITAL | Age: 65
DRG: 388 | End: 2025-05-13
Payer: COMMERCIAL

## 2025-05-13 LAB
ADV 40+41 DNA STL QL NAA+NON-PROBE: NOT DETECTED
ALBUMIN SERPL-MCNC: 2.4 G/DL (ref 3.5–5.2)
ALBUMIN/GLOB SERPL: 0.9 G/DL
ALP SERPL-CCNC: 50 U/L (ref 39–117)
ALT SERPL W P-5'-P-CCNC: 17 U/L (ref 1–41)
ANION GAP SERPL CALCULATED.3IONS-SCNC: 13 MMOL/L (ref 5–15)
AST SERPL-CCNC: 12 U/L (ref 1–40)
ASTRO TYP 1-8 RNA STL QL NAA+NON-PROBE: NOT DETECTED
BASOPHILS # BLD AUTO: 0.03 10*3/MM3 (ref 0–0.2)
BASOPHILS NFR BLD AUTO: 0.7 % (ref 0–1.5)
BILIRUB SERPL-MCNC: 0.5 MG/DL (ref 0–1.2)
BUN SERPL-MCNC: 15 MG/DL (ref 8–23)
BUN/CREAT SERPL: 19 (ref 7–25)
C CAYETANENSIS DNA STL QL NAA+NON-PROBE: NOT DETECTED
C COLI+JEJ+UPSA DNA STL QL NAA+NON-PROBE: NOT DETECTED
CALCIUM SPEC-SCNC: 7.8 MG/DL (ref 8.6–10.5)
CHLORIDE SERPL-SCNC: 100 MMOL/L (ref 98–107)
CO2 SERPL-SCNC: 21 MMOL/L (ref 22–29)
CREAT SERPL-MCNC: 0.79 MG/DL (ref 0.76–1.27)
CRYPTOSP DNA STL QL NAA+NON-PROBE: NOT DETECTED
DEPRECATED RDW RBC AUTO: 42.3 FL (ref 37–54)
E HISTOLYT DNA STL QL NAA+NON-PROBE: NOT DETECTED
EAEC PAA PLAS AGGR+AATA ST NAA+NON-PRB: NOT DETECTED
EC STX1+STX2 GENES STL QL NAA+NON-PROBE: NOT DETECTED
EGFRCR SERPLBLD CKD-EPI 2021: 99.8 ML/MIN/1.73
EOSINOPHIL # BLD AUTO: 0.15 10*3/MM3 (ref 0–0.4)
EOSINOPHIL NFR BLD AUTO: 3.7 % (ref 0.3–6.2)
EPEC EAE GENE STL QL NAA+NON-PROBE: NOT DETECTED
ERYTHROCYTE [DISTWIDTH] IN BLOOD BY AUTOMATED COUNT: 13.3 % (ref 12.3–15.4)
ETEC LTA+ST1A+ST1B TOX ST NAA+NON-PROBE: NOT DETECTED
G LAMBLIA DNA STL QL NAA+NON-PROBE: NOT DETECTED
GLOBULIN UR ELPH-MCNC: 2.8 GM/DL
GLUCOSE BLDC GLUCOMTR-MCNC: 104 MG/DL (ref 70–130)
GLUCOSE BLDC GLUCOMTR-MCNC: 126 MG/DL (ref 70–130)
GLUCOSE BLDC GLUCOMTR-MCNC: 157 MG/DL (ref 70–130)
GLUCOSE BLDC GLUCOMTR-MCNC: 171 MG/DL (ref 70–130)
GLUCOSE BLDC GLUCOMTR-MCNC: 92 MG/DL (ref 70–130)
GLUCOSE SERPL-MCNC: 194 MG/DL (ref 65–99)
HCT VFR BLD AUTO: 38 % (ref 37.5–51)
HGB BLD-MCNC: 13.1 G/DL (ref 13–17.7)
IMM GRANULOCYTES # BLD AUTO: 0.05 10*3/MM3 (ref 0–0.05)
IMM GRANULOCYTES NFR BLD AUTO: 1.2 % (ref 0–0.5)
LYMPHOCYTES # BLD AUTO: 0.93 10*3/MM3 (ref 0.7–3.1)
LYMPHOCYTES NFR BLD AUTO: 23.1 % (ref 19.6–45.3)
MAGNESIUM SERPL-MCNC: 2.1 MG/DL (ref 1.6–2.4)
MCH RBC QN AUTO: 29.6 PG (ref 26.6–33)
MCHC RBC AUTO-ENTMCNC: 34.5 G/DL (ref 31.5–35.7)
MCV RBC AUTO: 85.8 FL (ref 79–97)
MONOCYTES # BLD AUTO: 0.77 10*3/MM3 (ref 0.1–0.9)
MONOCYTES NFR BLD AUTO: 19.2 % (ref 5–12)
NEUTROPHILS NFR BLD AUTO: 2.09 10*3/MM3 (ref 1.7–7)
NEUTROPHILS NFR BLD AUTO: 52.1 % (ref 42.7–76)
NOROVIRUS GI+II RNA STL QL NAA+NON-PROBE: NOT DETECTED
NRBC BLD AUTO-RTO: 0 /100 WBC (ref 0–0.2)
P SHIGELLOIDES DNA STL QL NAA+NON-PROBE: NOT DETECTED
PHOSPHATE SERPL-MCNC: 1.2 MG/DL (ref 2.5–4.5)
PHOSPHATE SERPL-MCNC: 2.7 MG/DL (ref 2.5–4.5)
PLAT MORPH BLD: NORMAL
PLATELET # BLD AUTO: 291 10*3/MM3 (ref 140–450)
PMV BLD AUTO: 8.9 FL (ref 6–12)
POTASSIUM SERPL-SCNC: 3.7 MMOL/L (ref 3.5–5.2)
PROT SERPL-MCNC: 5.2 G/DL (ref 6–8.5)
RBC # BLD AUTO: 4.43 10*6/MM3 (ref 4.14–5.8)
RBC MORPH BLD: NORMAL
RVA RNA STL QL NAA+NON-PROBE: NOT DETECTED
S ENT+BONG DNA STL QL NAA+NON-PROBE: NOT DETECTED
SAPO I+II+IV+V RNA STL QL NAA+NON-PROBE: NOT DETECTED
SHIGELLA SP+EIEC IPAH ST NAA+NON-PROBE: NOT DETECTED
SODIUM SERPL-SCNC: 134 MMOL/L (ref 136–145)
V CHOL+PARA+VUL DNA STL QL NAA+NON-PROBE: NOT DETECTED
V CHOLERAE DNA STL QL NAA+NON-PROBE: NOT DETECTED
WBC MORPH BLD: NORMAL
WBC NRBC COR # BLD AUTO: 4.02 10*3/MM3 (ref 3.4–10.8)
Y ENTEROCOL DNA STL QL NAA+NON-PROBE: NOT DETECTED

## 2025-05-13 PROCEDURE — 85025 COMPLETE CBC W/AUTO DIFF WBC: CPT | Performed by: INTERNAL MEDICINE

## 2025-05-13 PROCEDURE — 85007 BL SMEAR W/DIFF WBC COUNT: CPT | Performed by: INTERNAL MEDICINE

## 2025-05-13 PROCEDURE — 84100 ASSAY OF PHOSPHORUS: CPT | Performed by: INTERNAL MEDICINE

## 2025-05-13 PROCEDURE — 25810000003 SODIUM CHLORIDE 0.9 % SOLUTION: Performed by: INTERNAL MEDICINE

## 2025-05-13 PROCEDURE — 25510000002 DIATRIZOATE MEGLUMINE & SODIUM PER 1 ML: Performed by: INTERNAL MEDICINE

## 2025-05-13 PROCEDURE — 63710000001 INSULIN GLARGINE PER 5 UNITS: Performed by: STUDENT IN AN ORGANIZED HEALTH CARE EDUCATION/TRAINING PROGRAM

## 2025-05-13 PROCEDURE — 25010000002 METOCLOPRAMIDE PER 10 MG: Performed by: INTERNAL MEDICINE

## 2025-05-13 PROCEDURE — 87507 IADNA-DNA/RNA PROBE TQ 12-25: CPT | Performed by: STUDENT IN AN ORGANIZED HEALTH CARE EDUCATION/TRAINING PROGRAM

## 2025-05-13 PROCEDURE — 80053 COMPREHEN METABOLIC PANEL: CPT | Performed by: INTERNAL MEDICINE

## 2025-05-13 PROCEDURE — 82948 REAGENT STRIP/BLOOD GLUCOSE: CPT

## 2025-05-13 PROCEDURE — 83735 ASSAY OF MAGNESIUM: CPT | Performed by: INTERNAL MEDICINE

## 2025-05-13 PROCEDURE — 94640 AIRWAY INHALATION TREATMENT: CPT

## 2025-05-13 PROCEDURE — 25010000002 HEPARIN (PORCINE) PER 1000 UNITS: Performed by: STUDENT IN AN ORGANIZED HEALTH CARE EDUCATION/TRAINING PROGRAM

## 2025-05-13 PROCEDURE — 74250 X-RAY XM SM INT 1CNTRST STD: CPT

## 2025-05-13 PROCEDURE — 94799 UNLISTED PULMONARY SVC/PX: CPT

## 2025-05-13 PROCEDURE — 25810000003 LACTATED RINGERS PER 1000 ML: Performed by: INTERNAL MEDICINE

## 2025-05-13 PROCEDURE — 99232 SBSQ HOSP IP/OBS MODERATE 35: CPT | Performed by: INTERNAL MEDICINE

## 2025-05-13 PROCEDURE — 63710000001 INSULIN REGULAR HUMAN PER 5 UNITS: Performed by: STUDENT IN AN ORGANIZED HEALTH CARE EDUCATION/TRAINING PROGRAM

## 2025-05-13 RX ORDER — DIATRIZOATE MEGLUMINE AND DIATRIZOATE SODIUM 660; 100 MG/ML; MG/ML
240 SOLUTION ORAL; RECTAL
Status: COMPLETED | OUTPATIENT
Start: 2025-05-13 | End: 2025-05-13

## 2025-05-13 RX ORDER — FENTANYL/ROPIVACAINE/NS/PF 2-625MCG/1
15 PLASTIC BAG, INJECTION (ML) EPIDURAL
Status: COMPLETED | OUTPATIENT
Start: 2025-05-13 | End: 2025-05-13

## 2025-05-13 RX ADMIN — INSULIN GLARGINE 25 UNITS: 100 INJECTION, SOLUTION SUBCUTANEOUS at 08:09

## 2025-05-13 RX ADMIN — METOCLOPRAMIDE 10 MG: 5 INJECTION, SOLUTION INTRAMUSCULAR; INTRAVENOUS at 20:07

## 2025-05-13 RX ADMIN — HEPARIN SODIUM 5000 UNITS: 5000 INJECTION INTRAVENOUS; SUBCUTANEOUS at 05:57

## 2025-05-13 RX ADMIN — POTASSIUM PHOSPHATE, MONOBASIC POTASSIUM PHOSPHATE, DIBASIC 15 MMOL: 224; 236 INJECTION, SOLUTION, CONCENTRATE INTRAVENOUS at 13:15

## 2025-05-13 RX ADMIN — FLUOXETINE HYDROCHLORIDE 20 MG: 20 CAPSULE ORAL at 11:03

## 2025-05-13 RX ADMIN — EMPAGLIFLOZIN 25 MG: 25 TABLET, FILM COATED ORAL at 06:49

## 2025-05-13 RX ADMIN — POTASSIUM PHOSPHATE, MONOBASIC POTASSIUM PHOSPHATE, DIBASIC 15 MMOL: 224; 236 INJECTION, SOLUTION, CONCENTRATE INTRAVENOUS at 10:14

## 2025-05-13 RX ADMIN — LOSARTAN POTASSIUM 100 MG: 50 TABLET, FILM COATED ORAL at 11:03

## 2025-05-13 RX ADMIN — METOCLOPRAMIDE 10 MG: 5 INJECTION, SOLUTION INTRAMUSCULAR; INTRAVENOUS at 13:13

## 2025-05-13 RX ADMIN — HEPARIN SODIUM 5000 UNITS: 5000 INJECTION INTRAVENOUS; SUBCUTANEOUS at 21:02

## 2025-05-13 RX ADMIN — PRAVASTATIN SODIUM 40 MG: 40 TABLET ORAL at 11:03

## 2025-05-13 RX ADMIN — BUPROPION HYDROCHLORIDE 300 MG: 150 TABLET, EXTENDED RELEASE ORAL at 11:04

## 2025-05-13 RX ADMIN — PANTOPRAZOLE SODIUM 40 MG: 40 INJECTION, POWDER, FOR SOLUTION INTRAVENOUS at 05:57

## 2025-05-13 RX ADMIN — LAMOTRIGINE 300 MG: 100 TABLET ORAL at 11:03

## 2025-05-13 RX ADMIN — SODIUM CHLORIDE, SODIUM LACTATE, POTASSIUM CHLORIDE, AND CALCIUM CHLORIDE 100 ML/HR: 600; 310; 30; 20 INJECTION, SOLUTION INTRAVENOUS at 06:10

## 2025-05-13 RX ADMIN — ASPIRIN 81 MG: 81 TABLET, COATED ORAL at 11:03

## 2025-05-13 RX ADMIN — POTASSIUM PHOSPHATE, MONOBASIC POTASSIUM PHOSPHATE, DIBASIC 15 MMOL: 224; 236 INJECTION, SOLUTION, CONCENTRATE INTRAVENOUS at 05:58

## 2025-05-13 RX ADMIN — METOCLOPRAMIDE 10 MG: 5 INJECTION, SOLUTION INTRAMUSCULAR; INTRAVENOUS at 01:00

## 2025-05-13 RX ADMIN — SODIUM CHLORIDE, SODIUM LACTATE, POTASSIUM CHLORIDE, AND CALCIUM CHLORIDE 100 ML/HR: 600; 310; 30; 20 INJECTION, SOLUTION INTRAVENOUS at 15:23

## 2025-05-13 RX ADMIN — INSULIN HUMAN 2 UNITS: 100 INJECTION, SOLUTION PARENTERAL at 05:58

## 2025-05-13 RX ADMIN — DIATRIZOATE MEGLUMINE AND DIATRIZOATE SODIUM 240 ML: 660; 100 LIQUID ORAL; RECTAL at 08:51

## 2025-05-13 RX ADMIN — METOCLOPRAMIDE 10 MG: 5 INJECTION, SOLUTION INTRAMUSCULAR; INTRAVENOUS at 08:09

## 2025-05-13 RX ADMIN — Medication 10 ML: at 21:17

## 2025-05-13 RX ADMIN — ALBUTEROL SULFATE 2.5 MG: 2.5 SOLUTION RESPIRATORY (INHALATION) at 03:06

## 2025-05-13 RX ADMIN — HEPARIN SODIUM 5000 UNITS: 5000 INJECTION INTRAVENOUS; SUBCUTANEOUS at 13:13

## 2025-05-13 NOTE — PROGRESS NOTES
[facility] Medicine Services  PROGRESS NOTE    Patient Name: Joselito Chowdary III  : 1960  MRN: [id number]    Date of Admission: 2025  Primary Care Physician: Melissa Guerra APRN    Subjective   Subjective     CC:  Ileus    HPI:  Had 4 bowel movements, NG tube clamped and starting clear liquids per surgery      Objective   Objective     Vital Signs:   Temp:  [97.7 °F (36.5 °C)-98.4 °F (36.9 °C)] 97.7 °F (36.5 °C)  Heart Rate:  [] 97  Resp:  [17-24] 18  BP: (150-172)/(71-80) 158/79  Flow (L/min) (Oxygen Therapy):  [2] 2     Physical Exam:  Constitutional: No acute distress, awake, alert  HENT: NCAT, mucous membranes moist  Respiratory: Respiratory effort normal   Gastrointestinal: Soft, nontender, distended  Musculoskeletal: No bilateral ankle edema  Psychiatric: Appropriate affect, cooperative  Neurologic: Oriented x 3, speech clear  Skin: No rashes      Results Reviewed:  LAB RESULTS:      Lab 25  0413 25  0416 25  0332 25  2313 25  2124 25  0511 05/10/25  1242 25  1016 25  0844   WBC 7.28 4.02 3.66  --  4.38 4.15 4.27  --  9.05   HEMOGLOBIN 13.5 13.1 14.5  --  14.9 14.5 16.4  --  17.5   HEMATOCRIT 41.1 38.0 43.1  --  42.8 41.9 47.8  --  51.5*   PLATELETS 339 291 308  --  288 243 248  --  245   NEUTROS ABS 4.90 2.09  --   --  2.72 2.10 2.81  --  6.97   IMMATURE GRANS (ABS) 0.19* 0.05  --   --   --   --  0.02  --  0.01   LYMPHS ABS 1.14 0.93  --   --   --   --  0.60*  --  1.06   MONOS ABS 0.92* 0.77  --   --   --   --  0.81  --  0.76   EOS ABS 0.09 0.15  --   --  0.00 0.05 0.02  --  0.23   MCV 88.6 85.8 86.4  --  85.3 85.0 85.2  --  85.4   CRP  --   --   --   --   --   --   --   --  1.88*   LACTATE  --   --   --   --  2.0  --  2.0  --  1.7   HSTROP T  --   --   --  17 26*  --   --  15 16         Lab 25  0413 25  2042 25  0416 25  1555 25  0332 25  2124 25  0511 05/10/25  1320    SODIUM 144  --  134*  --  131* 130* 132* 132*   POTASSIUM 3.9  --  3.7 3.3* 3.3* 3.8 3.5 3.6   CHLORIDE 106  --  100  --  96* 93* 97* 97*   CO2 18.0*  --  21.0*  --  18.0* 18.0* 21.0* 22.6   ANION GAP 20.0*  --  13.0  --  17.0* 19.0* 14.0 12.4   BUN 11  --  15  --  24* 24* 16 12   CREATININE 0.88  --  0.79  --  1.15 1.30* 1.02 1.05   EGFR 96.6  --  99.8  --  71.5 61.7 82.6 79.8   GLUCOSE 102*  --  194*  --  313* 375* 270* 342*   CALCIUM 8.2*  --  7.8*  --  7.7* 7.7* 7.6* 7.3*   MAGNESIUM  --   --  2.1  --  2.0  --   --  1.8   PHOSPHORUS 3.0 2.7 1.2*  --   --   --   --   --    HEMOGLOBIN A1C  --   --   --   --   --   --  6.69*  --          Lab 05/14/25 0413 05/13/25 0416 05/11/25 2124 05/11/25  0511 05/10/25  1320 05/08/25  0844   TOTAL PROTEIN  --  5.2* 5.5* 5.4* 5.3* 7.3   ALBUMIN 2.6* 2.4* 2.9* 3.0* 3.0* 4.3   GLOBULIN  --  2.8 2.6 2.4 2.3 3.0   ALT (SGPT)  --  17 26 33 42* 50*   AST (SGOT)  --  12 11 15 35 71*   BILIRUBIN  --  0.5 1.0 1.0 1.0 0.6   ALK PHOS  --  50 56 54 56 88   LIPASE  --   --  24  --  12* 25         Lab 05/11/25  2313 05/11/25 2124 05/08/25  1016 05/08/25  0844   PROBNP  --   --   --  65.5   HSTROP T 17 26* 15 16                 Lab 05/11/25  2328   PH, ARTERIAL 7.396   PCO2, ARTERIAL 31.2*   PO2 ART 72.5*   FIO2 21   HCO3 ART 19.1*   BASE EXCESS ART -4.6*   CARBOXYHEMOGLOBIN 1.3     Brief Urine Lab Results  (Last result in the past 365 days)        Color   Clarity   Blood   Leuk Est   Nitrite   Protein   CREAT   Urine HCG        05/11/25 2138 Dark Yellow   Clear   Negative   Negative   Negative   30 mg/dL (1+)                   Microbiology Results Abnormal       None            FL Small Bowel Follow Through Single-Contrast  Result Date: 5/13/2025  FL SMALL BOWEL FOLLOW THROUGH SINGLE-CONTRAST Date of Exam: 5/13/2025 8:30 AM EDT Indication: ileus/SBO.   Comparison: None available. Technique:   image of the abdomen was obtained. A single contrast study using thin barium was  performed. Radiologic images of the small bowel were obtained at timed intervals, including fluoroscopic spot imaging. Fluoroscopic Time: 0 Number of Images: 12 Findings:  imaging reveals an air-filled colon. 30-minute film shows contrast opacification of multiple loops of mildly prominent proximal small bowel. 60-minute film shows contrast opacification of multiple loops of mildly dilated proximal, and mid small bowel, with multiple loops of grossly normal-appearing distal small bowel. Contrast was seen reaching the colon at 2 hours. Loops of dilated proximal to mid small bowel measure approximately 4 to 5 cm in diameter. Multiple loops of mildly dilated proximal to mid small bowel, with grossly normal-appearing distal small bowel, contrast seen within the colon at 2 hours likely represents a partial mid to distal small bowel obstruction, possibly ileus. There was no evidence of complete small bowel obstruction.     Impression: Impression: Multiple loops of mildly dilated proximal to mid small bowel, with grossly normal-appearing distal small bowel, and contrast seen within the colon at 2 hours. Findings likely represent a partial mid to distal small bowel obstruction, possibly ileus. There was no evidence of complete small bowel obstruction. Report dictated by: Nidia Dempsey PA-c  I have personally reviewed this case and agree with the findings above: Electronically Signed: Km Jean MD  5/13/2025 3:38 PM EDT  Workstation ID: [id number]      Results for orders placed during the hospital encounter of 07/12/21    Adult Transthoracic Echo Complete W/ Cont if Necessary Per Protocol    Interpretation Summary  · Normal LV systolic function  · Mild MR      Current medications:  Scheduled Meds:aspirin, 81 mg, Oral, Daily  [Held by provider] bumetanide, 1 mg, Oral, Daily  buPROPion XL, 300 mg, Oral, Daily  empagliflozin, 25 mg, Oral, QAM  FLUoxetine, 20 mg, Oral, Daily  heparin (porcine), 5,000 Units, Subcutaneous,  Q8H  losartan, 100 mg, Oral, Q24H   And  [Held by provider] hydroCHLOROthiazide, 25 mg, Oral, Q24H  insulin glargine, 25 Units, Subcutaneous, Daily  insulin regular, 2-9 Units, Subcutaneous, Q6H  lamoTRIgine, 300 mg, Oral, Daily  [Held by provider] metFORMIN ER, 1,000 mg, Oral, QAM   And  [Held by provider] metFORMIN ER, 1,000 mg, Oral, Nightly  metoclopramide, 10 mg, Intravenous, Q6H  pantoprazole, 40 mg, Intravenous, Q AM  pravastatin, 40 mg, Oral, Daily  sodium chloride, 10 mL, Intravenous, Q12H  [Held by provider] sucralfate, 1 g, Oral, 4x Daily      Continuous Infusions:lactated ringers, 100 mL/hr, Last Rate: 100 mL/hr (05/14/25 0057)      PRN Meds:.  acetaminophen **OR** acetaminophen **OR** acetaminophen    Albuterol Sulfate NEB Orderable    senna-docusate sodium **AND** polyethylene glycol **AND** bisacodyl **AND** bisacodyl    Calcium Replacement - Follow Nurse / BPA Driven Protocol    dextrose    dextrose    glucagon (human recombinant)    Magnesium Standard Dose Replacement - Follow Nurse / BPA Driven Protocol    Morphine **AND** naloxone    Phosphorus Replacement - Follow Nurse / BPA Driven Protocol    Potassium Replacement - Follow Nurse / BPA Driven Protocol    Sodium Chloride (PF)    [COMPLETED] Insert Peripheral IV **AND** sodium chloride    sodium chloride    sodium chloride    Assessment & Plan   Assessment & Plan     Active Hospital Problems    Diagnosis  POA    **Ileus [K56.7]  Yes    Anxiety [F41.9]  Yes    Dyslipidemia [E78.5]  Yes    Essential hypertension [I10]  Yes    Severe obstructive sleep apnea [G47.33]  Yes    Type 2 diabetes mellitus [E11.9]  Yes      Resolved Hospital Problems   No resolved problems to display.        Brief Hospital Course to date:  Joselito Chowdary III is a 65 y.o. male with history of HTN, T2DM, HLD, GT admitted with persistent abd pain and vomiting since 5/8/2025 requiring multiple ER visits.  CT abdomen from this admission 5/11/2025 concerning for small bowel  obstruction versus ileus. General surgery evaluated, recommending non-op management for now.     Abdominal pain and vomiting  Ileus, possible diabetic vs post-infectious motility impairment  -Precipitated by food poisoning/gastroenteritis.  Likely with a component of diabetic gastroparesis  -CT abdomen is concerning for ileus versus bowel obstruction  - NG tube clamped, remove when okay with surgery  - s/p SBFT on 5/12 with no evidence of complete bowel obstruction  - GI PCR panel negative  -Continue Reglan, IVF, anti-emetics     Dizziness  -Check orthostatics today     МАРИЯ   Hyponatremia  Metabolic acidosis  -improved     Type 2 diabetes  -Recent A1c 6.6%  -basal insulin and SSI     Hypertension  Dyslipidemia  -Continue losartan  -Hold hydrochlorothiazide  -Continue statin     GT    Expected Discharge Location and Transportation: Likely home  Expected Discharge   Expected Discharge Date: 5/16/2025; Expected Discharge Time:      VTE Prophylaxis:  Pharmacologic VTE prophylaxis orders are present.         AM-PAC 6 Clicks Score (PT): 20 (05/14/25 0848)    CODE STATUS:   Code Status and Medical Interventions: CPR (Attempt to Resuscitate); Full Support   Ordered at: 05/12/25 0113     Code Status (Patient has no pulse and is not breathing):    CPR (Attempt to Resuscitate)     Medical Interventions (Patient has pulse or is breathing):    Full Support       Earnestine Proctor, DO  05/14/25

## 2025-05-13 NOTE — PLAN OF CARE
Goal Outcome Evaluation:              Outcome Evaluation: (P) pt resting on RA, he did request 2L NC today for when he was napping but while awake he is RA. NSR on tele. NG to low wall suction 800ml output today & still NPO w/ice chips. had several large liquid BMs, phosphorus replaced. no complaints of pain, VSS.

## 2025-05-13 NOTE — PLAN OF CARE
Goal Outcome Evaluation:  Plan of Care Reviewed With: patient        Progress: improving  Outcome Evaluation: Pt. with no complaints overnight, denies pain, nsr on monitor, placed on 1L NC while sleeping due to not being able to wear bipap at night.  NG remains to low wall suction.  plan of care ongoing

## 2025-05-13 NOTE — PROGRESS NOTES
"Patient Name:  Joselito Chowdary III  YOB: 1960  [id number]    Surgery Progress Note    Date of visit: 5/13/2025    Subjective   Abdominal pain improved this AM. NGT with 1200 out. Less distended and bloated. Small BM this AM.        Objective       /70 (BP Location: Left arm, Patient Position: Lying)   Pulse 90   Temp 98.5 °F (36.9 °C) (Oral)   Resp 18   Ht 177.8 cm (70\")   Wt 129 kg (285 lb)   SpO2 96%   BMI 40.89 kg/m²     Intake/Output Summary (Last 24 hours) at 5/13/2025 0754  Last data filed at 5/13/2025 0604  Gross per 24 hour   Intake 450 ml   Output 1675 ml   Net -1225 ml       General: Alert, oriented x 3, well-appearing, no acute distress  Cardiovascular: regular rate and rhythm  Pulmonary: Breathing comfortably on room air, no respiratory distress  Abdomen: Soft, non-tender, non-distended, prior laparoscopic incisions are well-healed, NGT with clear bilious drainage    Recent labs and imaging that are back at this time have been reviewed.     Labs:    Results from last 7 days   Lab Units 05/13/25  0416   WBC 10*3/mm3 4.02   HEMOGLOBIN g/dL 13.1   HEMATOCRIT % 38.0   PLATELETS 10*3/mm3 291     Results from last 7 days   Lab Units 05/13/25  0416   SODIUM mmol/L 134*   POTASSIUM mmol/L 3.7   CHLORIDE mmol/L 100   CO2 mmol/L 21.0*   BUN mg/dL 15   CREATININE mg/dL 0.79   CALCIUM mg/dL 7.8*   BILIRUBIN mg/dL 0.5   ALK PHOS U/L 50   ALT (SGPT) U/L 17   AST (SGOT) U/L 12   GLUCOSE mg/dL 194*     Results from last 7 days   Lab Units 05/13/25  0416   SODIUM mmol/L 134*   POTASSIUM mmol/L 3.7   CHLORIDE mmol/L 100   CO2 mmol/L 21.0*   BUN mg/dL 15   CREATININE mg/dL 0.79   GLUCOSE mg/dL 194*   CALCIUM mg/dL 7.8*     Lab Results   Lab Value Date/Time    LIPASE 24 05/11/2025 2124    LIPASE 12 (L) 05/10/2025 1320    LIPASE 25 05/08/2025 0844    LIPASE 39 07/12/2021 1624    LIPASE 65 05/31/2019 1229            Assessment & Plan     Problem List Items Addressed This Visit       * (Principal) " Ileus     Other Visit Diagnoses         Intractable nausea and vomiting    -  Primary      Acute renal insufficiency        Relevant Medications    bumetanide (BUMEX) tablet 1 mg    hydroCHLOROthiazide tablet 25 mg      Increased anion gap metabolic acidosis          Pneumonia of left lower lobe due to infectious organism        Relevant Medications    albuterol (PROVENTIL) nebulizer solution 0.083% 2.5 mg/3mL            Active Hospital Problems    Diagnosis  POA    **Ileus [K56.7]  Yes    Anxiety [F41.9]  Yes    Dyslipidemia [E78.5]  Yes    Essential hypertension [I10]  Yes    Severe obstructive sleep apnea [G47.33]  Yes    Type 2 diabetes mellitus [E11.9]  Yes      Resolved Hospital Problems   No resolved problems to display.        Joselito Chowdary III is a 65 y.o. male presenting with likely adynamic ileus. On my independent review, CT A/P consistent with severe ileus with tapering of the ileum to decompressed loops. No evidence of perforation, pneumatosis, or high-grade mechanical obstruction. He continues to have bowel function. Given his clinical history, exam, and imaging, I favor the diagnosis of ileus. Recommend bowel decompression with NGT for at least 24 hours followed by small bowel follow through to prove lack of obstruction given distal decompressed bowel prior to using pro-motility medications.     Adynamic ileus  - recommend non-operative management  - NGT to LWS for decompression  - NPO, IVF  - SBFT to confirm no mechanical obstruction  - Appreciate hospitalist assistance    Hussein Gamboa MD  5/13/2025  07:54 EDT

## 2025-05-14 LAB
ALBUMIN SERPL-MCNC: 2.6 G/DL (ref 3.5–5.2)
ANION GAP SERPL CALCULATED.3IONS-SCNC: 20 MMOL/L (ref 5–15)
BASOPHILS # BLD AUTO: 0.04 10*3/MM3 (ref 0–0.2)
BASOPHILS NFR BLD AUTO: 0.5 % (ref 0–1.5)
BUN SERPL-MCNC: 11 MG/DL (ref 8–23)
BUN/CREAT SERPL: 12.5 (ref 7–25)
CALCIUM SPEC-SCNC: 8.2 MG/DL (ref 8.6–10.5)
CHLORIDE SERPL-SCNC: 106 MMOL/L (ref 98–107)
CO2 SERPL-SCNC: 18 MMOL/L (ref 22–29)
CREAT SERPL-MCNC: 0.88 MG/DL (ref 0.76–1.27)
DEPRECATED RDW RBC AUTO: 44.4 FL (ref 37–54)
EGFRCR SERPLBLD CKD-EPI 2021: 96.6 ML/MIN/1.73
EOSINOPHIL # BLD AUTO: 0.09 10*3/MM3 (ref 0–0.4)
EOSINOPHIL NFR BLD AUTO: 1.2 % (ref 0.3–6.2)
ERYTHROCYTE [DISTWIDTH] IN BLOOD BY AUTOMATED COUNT: 13.7 % (ref 12.3–15.4)
GLUCOSE BLDC GLUCOMTR-MCNC: 149 MG/DL (ref 70–130)
GLUCOSE BLDC GLUCOMTR-MCNC: 163 MG/DL (ref 70–130)
GLUCOSE BLDC GLUCOMTR-MCNC: 173 MG/DL (ref 70–130)
GLUCOSE BLDC GLUCOMTR-MCNC: 97 MG/DL (ref 70–130)
GLUCOSE SERPL-MCNC: 102 MG/DL (ref 65–99)
HCT VFR BLD AUTO: 41.1 % (ref 37.5–51)
HGB BLD-MCNC: 13.5 G/DL (ref 13–17.7)
IMM GRANULOCYTES # BLD AUTO: 0.19 10*3/MM3 (ref 0–0.05)
IMM GRANULOCYTES NFR BLD AUTO: 2.6 % (ref 0–0.5)
LYMPHOCYTES # BLD AUTO: 1.14 10*3/MM3 (ref 0.7–3.1)
LYMPHOCYTES NFR BLD AUTO: 15.7 % (ref 19.6–45.3)
MCH RBC QN AUTO: 29.1 PG (ref 26.6–33)
MCHC RBC AUTO-ENTMCNC: 32.8 G/DL (ref 31.5–35.7)
MCV RBC AUTO: 88.6 FL (ref 79–97)
MONOCYTES # BLD AUTO: 0.92 10*3/MM3 (ref 0.1–0.9)
MONOCYTES NFR BLD AUTO: 12.6 % (ref 5–12)
NEUTROPHILS NFR BLD AUTO: 4.9 10*3/MM3 (ref 1.7–7)
NEUTROPHILS NFR BLD AUTO: 67.4 % (ref 42.7–76)
NRBC BLD AUTO-RTO: 0 /100 WBC (ref 0–0.2)
PHOSPHATE SERPL-MCNC: 3 MG/DL (ref 2.5–4.5)
PLATELET # BLD AUTO: 339 10*3/MM3 (ref 140–450)
PMV BLD AUTO: 9.1 FL (ref 6–12)
POTASSIUM SERPL-SCNC: 3.9 MMOL/L (ref 3.5–5.2)
RBC # BLD AUTO: 4.64 10*6/MM3 (ref 4.14–5.8)
SODIUM SERPL-SCNC: 144 MMOL/L (ref 136–145)
WBC NRBC COR # BLD AUTO: 7.28 10*3/MM3 (ref 3.4–10.8)

## 2025-05-14 PROCEDURE — 25010000002 HEPARIN (PORCINE) PER 1000 UNITS: Performed by: STUDENT IN AN ORGANIZED HEALTH CARE EDUCATION/TRAINING PROGRAM

## 2025-05-14 PROCEDURE — 80069 RENAL FUNCTION PANEL: CPT | Performed by: INTERNAL MEDICINE

## 2025-05-14 PROCEDURE — 94799 UNLISTED PULMONARY SVC/PX: CPT

## 2025-05-14 PROCEDURE — 25010000002 METOCLOPRAMIDE PER 10 MG: Performed by: INTERNAL MEDICINE

## 2025-05-14 PROCEDURE — 25810000003 LACTATED RINGERS PER 1000 ML: Performed by: INTERNAL MEDICINE

## 2025-05-14 PROCEDURE — 85025 COMPLETE CBC W/AUTO DIFF WBC: CPT | Performed by: INTERNAL MEDICINE

## 2025-05-14 PROCEDURE — 82948 REAGENT STRIP/BLOOD GLUCOSE: CPT

## 2025-05-14 PROCEDURE — 63710000001 INSULIN REGULAR HUMAN PER 5 UNITS: Performed by: STUDENT IN AN ORGANIZED HEALTH CARE EDUCATION/TRAINING PROGRAM

## 2025-05-14 PROCEDURE — 94660 CPAP INITIATION&MGMT: CPT

## 2025-05-14 PROCEDURE — 99232 SBSQ HOSP IP/OBS MODERATE 35: CPT | Performed by: INTERNAL MEDICINE

## 2025-05-14 PROCEDURE — 63710000001 INSULIN GLARGINE PER 5 UNITS: Performed by: STUDENT IN AN ORGANIZED HEALTH CARE EDUCATION/TRAINING PROGRAM

## 2025-05-14 RX ADMIN — SODIUM CHLORIDE, SODIUM LACTATE, POTASSIUM CHLORIDE, AND CALCIUM CHLORIDE 100 ML/HR: 600; 310; 30; 20 INJECTION, SOLUTION INTRAVENOUS at 22:25

## 2025-05-14 RX ADMIN — Medication 10 ML: at 08:22

## 2025-05-14 RX ADMIN — PANTOPRAZOLE SODIUM 40 MG: 40 INJECTION, POWDER, FOR SOLUTION INTRAVENOUS at 05:39

## 2025-05-14 RX ADMIN — PRAVASTATIN SODIUM 40 MG: 40 TABLET ORAL at 08:22

## 2025-05-14 RX ADMIN — FLUOXETINE HYDROCHLORIDE 20 MG: 20 CAPSULE ORAL at 08:22

## 2025-05-14 RX ADMIN — LOSARTAN POTASSIUM 100 MG: 50 TABLET, FILM COATED ORAL at 08:22

## 2025-05-14 RX ADMIN — ACETAMINOPHEN 650 MG: 325 TABLET ORAL at 20:36

## 2025-05-14 RX ADMIN — HEPARIN SODIUM 5000 UNITS: 5000 INJECTION INTRAVENOUS; SUBCUTANEOUS at 13:53

## 2025-05-14 RX ADMIN — HEPARIN SODIUM 5000 UNITS: 5000 INJECTION INTRAVENOUS; SUBCUTANEOUS at 20:36

## 2025-05-14 RX ADMIN — METOCLOPRAMIDE 10 MG: 5 INJECTION, SOLUTION INTRAMUSCULAR; INTRAVENOUS at 20:37

## 2025-05-14 RX ADMIN — BUPROPION HYDROCHLORIDE 300 MG: 150 TABLET, EXTENDED RELEASE ORAL at 08:22

## 2025-05-14 RX ADMIN — LAMOTRIGINE 300 MG: 100 TABLET ORAL at 08:22

## 2025-05-14 RX ADMIN — EMPAGLIFLOZIN 25 MG: 25 TABLET, FILM COATED ORAL at 08:22

## 2025-05-14 RX ADMIN — SODIUM CHLORIDE, SODIUM LACTATE, POTASSIUM CHLORIDE, AND CALCIUM CHLORIDE 100 ML/HR: 600; 310; 30; 20 INJECTION, SOLUTION INTRAVENOUS at 00:57

## 2025-05-14 RX ADMIN — METOCLOPRAMIDE 10 MG: 5 INJECTION, SOLUTION INTRAMUSCULAR; INTRAVENOUS at 13:53

## 2025-05-14 RX ADMIN — ASPIRIN 81 MG: 81 TABLET, COATED ORAL at 08:22

## 2025-05-14 RX ADMIN — HEPARIN SODIUM 5000 UNITS: 5000 INJECTION INTRAVENOUS; SUBCUTANEOUS at 05:39

## 2025-05-14 RX ADMIN — INSULIN GLARGINE 25 UNITS: 100 INJECTION, SOLUTION SUBCUTANEOUS at 08:22

## 2025-05-14 RX ADMIN — INSULIN HUMAN 2 UNITS: 100 INJECTION, SOLUTION PARENTERAL at 23:27

## 2025-05-14 RX ADMIN — METOCLOPRAMIDE 10 MG: 5 INJECTION, SOLUTION INTRAMUSCULAR; INTRAVENOUS at 00:58

## 2025-05-14 RX ADMIN — INSULIN HUMAN 2 UNITS: 100 INJECTION, SOLUTION PARENTERAL at 12:14

## 2025-05-14 NOTE — PROGRESS NOTES
[facility] Medicine Services  PROGRESS NOTE    Patient Name: Joselito Chowdary III  : 1960  MRN: [id number]    Date of Admission: 2025  Primary Care Physician: Melissa Guerra APRN    Subjective   Subjective     CC:  SBO    HPI:  Tolerating liquid diet, NG tube removed      Objective   Objective     Vital Signs:   Temp:  [97.6 °F (36.4 °C)-100.8 °F (38.2 °C)] 97.8 °F (36.6 °C)  Heart Rate:  [] 95  Resp:  [16-22] 18  BP: (128-155)/(57-79) 128/66     Physical Exam:  Constitutional: No acute distress, awake, alert  HENT: NCAT, mucous membranes moist  Respiratory: Respiratory effort normal   Gastrointestinal:Soft, nontender, nondistended  Musculoskeletal: Trace LE edema  Psychiatric: Appropriate affect, cooperative  Neurologic: Oriented x 3, speech clear  Skin: No rashes      Results Reviewed:  LAB RESULTS:      Lab 05/15/25  0342 25  0413 25  0416 25  0332 25  2313 25  2124 25  0511 05/10/25  1242   WBC 9.47 7.28 4.02 3.66  --  4.38 4.15 4.27   HEMOGLOBIN 13.5 13.5 13.1 14.5  --  14.9 14.5 16.4   HEMATOCRIT 42.0 41.1 38.0 43.1  --  42.8 41.9 47.8   PLATELETS 356 339 291 308  --  288 243 248   NEUTROS ABS 6.61 4.90 2.09  --   --  2.72 2.10 2.81   IMMATURE GRANS (ABS) 0.34* 0.19* 0.05  --   --   --   --  0.02   LYMPHS ABS 1.35 1.14 0.93  --   --   --   --  0.60*   MONOS ABS 0.98* 0.92* 0.77  --   --   --   --  0.81   EOS ABS 0.15 0.09 0.15  --   --  0.00 0.05 0.02   MCV 89.0 88.6 85.8 86.4  --  85.3 85.0 85.2   LACTATE  --   --   --   --   --  2.0  --  2.0   HSTROP T  --   --   --   --  17 26*  --   --          Lab 05/15/25  0342 05/14/25  0413 05/13/25  2042 05/13/25  0416 05/12/25  1555 25  0332 254 25  0511 05/10/25  1320   SODIUM 136 144  --  134*  --  131* 130* 132* 132*   POTASSIUM 3.5 3.9  --  3.7 3.3* 3.3* 3.8 3.5 3.6   CHLORIDE 100 106  --  100  --  96* 93* 97* 97*   CO2 21.0* 18.0*  --  21.0*  --  18.0*  18.0* 21.0* 22.6   ANION GAP 15.0 20.0*  --  13.0  --  17.0* 19.0* 14.0 12.4   BUN 5* 11  --  15  --  24* 24* 16 12   CREATININE 0.74* 0.88  --  0.79  --  1.15 1.30* 1.02 1.05   EGFR 101.8 96.6  --  99.8  --  71.5 61.7 82.6 79.8   GLUCOSE 124* 102*  --  194*  --  313* 375* 270* 342*   CALCIUM 8.1* 8.2*  --  7.8*  --  7.7* 7.7* 7.6* 7.3*   MAGNESIUM  --   --   --  2.1  --  2.0  --   --  1.8   PHOSPHORUS 2.7 3.0 2.7 1.2*  --   --   --   --   --    HEMOGLOBIN A1C  --   --   --   --   --   --   --  6.69*  --          Lab 05/15/25  0342 05/14/25  0413 05/13/25  0416 05/11/25  2124 05/11/25  0511 05/10/25  1320   TOTAL PROTEIN  --   --  5.2* 5.5* 5.4* 5.3*   ALBUMIN 2.4* 2.6* 2.4* 2.9* 3.0* 3.0*   GLOBULIN  --   --  2.8 2.6 2.4 2.3   ALT (SGPT)  --   --  17 26 33 42*   AST (SGOT)  --   --  12 11 15 35   BILIRUBIN  --   --  0.5 1.0 1.0 1.0   ALK PHOS  --   --  50 56 54 56   LIPASE  --   --   --  24  --  12*         Lab 05/11/25  2313 05/11/25 2124   HSTROP T 17 26*                 Lab 05/11/25 2328   PH, ARTERIAL 7.396   PCO2, ARTERIAL 31.2*   PO2 ART 72.5*   FIO2 21   HCO3 ART 19.1*   BASE EXCESS ART -4.6*   CARBOXYHEMOGLOBIN 1.3     Brief Urine Lab Results  (Last result in the past 365 days)        Color   Clarity   Blood   Leuk Est   Nitrite   Protein   CREAT   Urine HCG        05/11/25 2138 Dark Yellow   Clear   Negative   Negative   Negative   30 mg/dL (1+)                   Microbiology Results Abnormal       None            No radiology results from the last 24 hrs      Results for orders placed during the hospital encounter of 07/12/21    Adult Transthoracic Echo Complete W/ Cont if Necessary Per Protocol    Interpretation Summary  · Normal LV systolic function  · Mild MR      Current medications:  Scheduled Meds:aspirin, 81 mg, Oral, Daily  [Held by provider] bumetanide, 1 mg, Oral, Daily  buPROPion XL, 300 mg, Oral, Daily  empagliflozin, 25 mg, Oral, QAM  FLUoxetine, 20 mg, Oral, Daily  heparin (porcine), 5,000  Units, Subcutaneous, Q8H  losartan, 100 mg, Oral, Q24H   And  [Held by provider] hydroCHLOROthiazide, 25 mg, Oral, Q24H  insulin glargine, 25 Units, Subcutaneous, Daily  insulin regular, 2-9 Units, Subcutaneous, Q6H  lamoTRIgine, 300 mg, Oral, Daily  [Held by provider] metFORMIN ER, 1,000 mg, Oral, QAM   And  [Held by provider] metFORMIN ER, 1,000 mg, Oral, Nightly  metoclopramide, 10 mg, Intravenous, Q6H  pantoprazole, 40 mg, Intravenous, Q AM  pravastatin, 40 mg, Oral, Daily  sodium chloride, 10 mL, Intravenous, Q12H  [Held by provider] sucralfate, 1 g, Oral, 4x Daily      Continuous Infusions:     PRN Meds:.  acetaminophen **OR** acetaminophen **OR** acetaminophen    Albuterol Sulfate NEB Orderable    senna-docusate sodium **AND** polyethylene glycol **AND** bisacodyl **AND** bisacodyl    Calcium Replacement - Follow Nurse / BPA Driven Protocol    dextrose    dextrose    glucagon (human recombinant)    Magnesium Standard Dose Replacement - Follow Nurse / BPA Driven Protocol    Morphine **AND** naloxone    Phosphorus Replacement - Follow Nurse / BPA Driven Protocol    Potassium Replacement - Follow Nurse / BPA Driven Protocol    Sodium Chloride (PF)    [COMPLETED] Insert Peripheral IV **AND** sodium chloride    sodium chloride    sodium chloride    Assessment & Plan   Assessment & Plan     Active Hospital Problems    Diagnosis  POA    **Ileus [K56.7]  Yes    Anxiety [F41.9]  Yes    Dyslipidemia [E78.5]  Yes    Essential hypertension [I10]  Yes    Severe obstructive sleep apnea [G47.33]  Yes    Type 2 diabetes mellitus [E11.9]  Yes      Resolved Hospital Problems   No resolved problems to display.        Brief Hospital Course to date:  Joselito Chowdary III is a 65 y.o. male with history of HTN, T2DM, HLD, GT admitted with persistent abd pain and vomiting since 5/8/2025 requiring multiple ER visits.  CT abdomen from this admission 5/11/2025 concerning for small bowel obstruction versus ileus. General surgery  evaluated, recommending non-op management for now.     Abdominal pain and vomiting  Ileus, possible diabetic vs post-infectious motility impairment  -Precipitated by food poisoning/gastroenteritis.  Likely with a component of diabetic gastroparesis  -CT abdomen is concerning for ileus versus bowel obstruction. Dx ruled in favor of ileus  - NG tube removed 5/14  - advance diet per surgery, changed to full liquid.  Waiting on Dr. Gamboa to give okay to advance further  - s/p SBFT on 5/12 with no evidence of complete bowel obstruction  - GI PCR panel negative  - DC Reglan       МАРИЯ   Hyponatremia  Metabolic acidosis  -improved     Type 2 diabetes  -Recent A1c 6.6%  -basal insulin and SSI     Hypertension  Dyslipidemia  -Continue losartan  -Hold hydrochlorothiazide  -Continue statin     GT         Expected Discharge Location and Transportation: Home  Expected Discharge   Expected Discharge Date: 5/16/2025; Expected Discharge Time:      VTE Prophylaxis:  Pharmacologic VTE prophylaxis orders are present.         AM-PAC 6 Clicks Score (PT): 23 (05/15/25 0800)    CODE STATUS:   Code Status and Medical Interventions: CPR (Attempt to Resuscitate); Full Support   Ordered at: 05/12/25 0113     Code Status (Patient has no pulse and is not breathing):    CPR (Attempt to Resuscitate)     Medical Interventions (Patient has pulse or is breathing):    Full Support       Earnestine Proctor,   05/15/25

## 2025-05-14 NOTE — PROGRESS NOTES
"Patient Name:  Joselito Chowdary III  YOB: 1960  [id number]    Surgery Progress Note    Date of visit: 5/14/2025    Subjective   NGT with 800 out overnight, but patient eating a lot of ice chips. Having bowel movements. Passed SBFT yesterday within 2 hours. Denies abdominal pain.       Objective       /71 (BP Location: Right arm, Patient Position: Lying)   Pulse 91   Temp 97.7 °F (36.5 °C) (Oral)   Resp 17   Ht 177.8 cm (70\")   Wt 129 kg (285 lb)   SpO2 95%   BMI 40.89 kg/m²     Intake/Output Summary (Last 24 hours) at 5/14/2025 0839  Last data filed at 5/14/2025 0827  Gross per 24 hour   Intake 225 ml   Output 2050 ml   Net -1825 ml       General: Alert, oriented x 3, well-appearing, no acute distress  Cardiovascular: regular rate and rhythm  Pulmonary: Breathing comfortably on room air, no respiratory distress  Abdomen: Soft, non-tender, non-distended, prior laparoscopic incisions are well-healed, NGT with clear gastric-appearing drainage    Recent labs and imaging that are back at this time have been reviewed.     Labs:    Results from last 7 days   Lab Units 05/14/25  0413   WBC 10*3/mm3 7.28   HEMOGLOBIN g/dL 13.5   HEMATOCRIT % 41.1   PLATELETS 10*3/mm3 339     Results from last 7 days   Lab Units 05/14/25  0413 05/13/25  0416   SODIUM mmol/L 144 134*   POTASSIUM mmol/L 3.9 3.7   CHLORIDE mmol/L 106 100   CO2 mmol/L 18.0* 21.0*   BUN mg/dL 11 15   CREATININE mg/dL 0.88 0.79   CALCIUM mg/dL 8.2* 7.8*   BILIRUBIN mg/dL  --  0.5   ALK PHOS U/L  --  50   ALT (SGPT) U/L  --  17   AST (SGOT) U/L  --  12   GLUCOSE mg/dL 102* 194*     Results from last 7 days   Lab Units 05/14/25  0413   SODIUM mmol/L 144   POTASSIUM mmol/L 3.9   CHLORIDE mmol/L 106   CO2 mmol/L 18.0*   BUN mg/dL 11   CREATININE mg/dL 0.88   GLUCOSE mg/dL 102*   CALCIUM mg/dL 8.2*     Lab Results   Lab Value Date/Time    LIPASE 24 05/11/2025 2124    LIPASE 12 (L) 05/10/2025 1320    LIPASE 25 05/08/2025 0844    LIPASE 39 " 07/12/2021 1624    LIPASE 65 05/31/2019 1229            Assessment & Plan     Problem List Items Addressed This Visit       * (Principal) Ileus     Other Visit Diagnoses         Intractable nausea and vomiting    -  Primary      Acute renal insufficiency        Relevant Medications    bumetanide (BUMEX) tablet 1 mg    hydroCHLOROthiazide tablet 25 mg      Increased anion gap metabolic acidosis          Pneumonia of left lower lobe due to infectious organism        Relevant Medications    albuterol (PROVENTIL) nebulizer solution 0.083% 2.5 mg/3mL            Active Hospital Problems    Diagnosis  POA    **Ileus [K56.7]  Yes    Anxiety [F41.9]  Yes    Dyslipidemia [E78.5]  Yes    Essential hypertension [I10]  Yes    Severe obstructive sleep apnea [G47.33]  Yes    Type 2 diabetes mellitus [E11.9]  Yes      Resolved Hospital Problems   No resolved problems to display.        Joselito Chowdary III is a 65 y.o. male presenting with likely adynamic ileus. On my independent review, CT A/P consistent with severe ileus with tapering of the ileum to decompressed loops. No evidence of perforation, pneumatosis, or high-grade mechanical obstruction. He continues to have bowel function. SBFT ruled out mechanical obstruction on 5/13.      Adynamic ileus  - recommend non-operative management  - Okay to clamp NGT and start CLD this AM  - Check residuals after lunch, okay to remove if patient asymptomatic and < 200 mL  - SBFT with passage of contrast into colon  - Appreciate hospitalist assistance    Hussein Gamboa MD  5/14/2025  08:39 EDT

## 2025-05-14 NOTE — PLAN OF CARE
Goal Outcome Evaluation:  Plan of Care Reviewed With: spouse, patient, child        Progress: improving  Outcome Evaluation: Pt on room air. NSR on tele. Clear liquids started and tolerated well. Pt with only 35 residual after being clamped until after lunch. Per surgery, NG was d/c'd. No nausea/pain since taking out. Pt with BM x2 this shift. LR at 100 ml/hr. No complaints. VSS

## 2025-05-14 NOTE — PLAN OF CARE
Goal Outcome Evaluation:  Plan of Care Reviewed With: patient        Progress: improving  Outcome Evaluation: pt. with no complaints overnight, nsr on tele, 2L NC while sleeping room air when awake.  NG tube remains to low wall suction, some red tinged output noted at beginning of shift, notified physician and received order to stop suction for one hour and then monitor output, all output since has been green in color.  pt. denies any pain or bleeding.  plan of care ongoing

## 2025-05-14 NOTE — CASE MANAGEMENT/SOCIAL WORK
Continued Stay Note  [facility]     Patient Name: Joselito Chowdary III  MRN: [id number]  Today's Date: 5/14/2025    Admit Date: 5/11/2025    Plan: Home   Discharge Plan       Row Name 05/14/25 3185       Plan    Plan Home    Patient/Family in Agreement with Plan yes    Plan Comments Discussed Mr Chowdary in MDR.  Mr Chowdary still has his NG tube, but it may be discontinued today.  He has been advanced to a clear liquid diet.  I spoke with Mr Chowdary at bedside.  At this time he denies any discharge needs. Case management will continue to follow.    Final Discharge Disposition Code 01 - home or self-care                   Discharge Codes    No documentation.                 Expected Discharge Date and Time       Expected Discharge Date Expected Discharge Time    May 14, 2025               Nichelle Clemente RN

## 2025-05-15 LAB
ALBUMIN SERPL-MCNC: 2.4 G/DL (ref 3.5–5.2)
ANION GAP SERPL CALCULATED.3IONS-SCNC: 15 MMOL/L (ref 5–15)
BACTERIA SPEC AEROBE CULT: NORMAL
BACTERIA SPEC AEROBE CULT: NORMAL
BASOPHILS # BLD AUTO: 0.04 10*3/MM3 (ref 0–0.2)
BASOPHILS NFR BLD AUTO: 0.4 % (ref 0–1.5)
BUN SERPL-MCNC: 5 MG/DL (ref 8–23)
BUN/CREAT SERPL: 6.8 (ref 7–25)
CALCIUM SPEC-SCNC: 8.1 MG/DL (ref 8.6–10.5)
CHLORIDE SERPL-SCNC: 100 MMOL/L (ref 98–107)
CO2 SERPL-SCNC: 21 MMOL/L (ref 22–29)
CREAT SERPL-MCNC: 0.74 MG/DL (ref 0.76–1.27)
DEPRECATED RDW RBC AUTO: 44.8 FL (ref 37–54)
EGFRCR SERPLBLD CKD-EPI 2021: 101.8 ML/MIN/1.73
EOSINOPHIL # BLD AUTO: 0.15 10*3/MM3 (ref 0–0.4)
EOSINOPHIL NFR BLD AUTO: 1.6 % (ref 0.3–6.2)
ERYTHROCYTE [DISTWIDTH] IN BLOOD BY AUTOMATED COUNT: 13.8 % (ref 12.3–15.4)
GLUCOSE BLDC GLUCOMTR-MCNC: 130 MG/DL (ref 70–130)
GLUCOSE BLDC GLUCOMTR-MCNC: 142 MG/DL (ref 70–130)
GLUCOSE BLDC GLUCOMTR-MCNC: 207 MG/DL (ref 70–130)
GLUCOSE SERPL-MCNC: 124 MG/DL (ref 65–99)
HCT VFR BLD AUTO: 42 % (ref 37.5–51)
HGB BLD-MCNC: 13.5 G/DL (ref 13–17.7)
IMM GRANULOCYTES # BLD AUTO: 0.34 10*3/MM3 (ref 0–0.05)
IMM GRANULOCYTES NFR BLD AUTO: 3.6 % (ref 0–0.5)
LYMPHOCYTES # BLD AUTO: 1.35 10*3/MM3 (ref 0.7–3.1)
LYMPHOCYTES NFR BLD AUTO: 14.3 % (ref 19.6–45.3)
MCH RBC QN AUTO: 28.6 PG (ref 26.6–33)
MCHC RBC AUTO-ENTMCNC: 32.1 G/DL (ref 31.5–35.7)
MCV RBC AUTO: 89 FL (ref 79–97)
MONOCYTES # BLD AUTO: 0.98 10*3/MM3 (ref 0.1–0.9)
MONOCYTES NFR BLD AUTO: 10.3 % (ref 5–12)
NEUTROPHILS NFR BLD AUTO: 6.61 10*3/MM3 (ref 1.7–7)
NEUTROPHILS NFR BLD AUTO: 69.8 % (ref 42.7–76)
NRBC BLD AUTO-RTO: 0 /100 WBC (ref 0–0.2)
PHOSPHATE SERPL-MCNC: 2.7 MG/DL (ref 2.5–4.5)
PLATELET # BLD AUTO: 356 10*3/MM3 (ref 140–450)
PMV BLD AUTO: 9 FL (ref 6–12)
POTASSIUM SERPL-SCNC: 3.5 MMOL/L (ref 3.5–5.2)
POTASSIUM SERPL-SCNC: 3.5 MMOL/L (ref 3.5–5.2)
RBC # BLD AUTO: 4.72 10*6/MM3 (ref 4.14–5.8)
SODIUM SERPL-SCNC: 136 MMOL/L (ref 136–145)
WBC NRBC COR # BLD AUTO: 9.47 10*3/MM3 (ref 3.4–10.8)

## 2025-05-15 PROCEDURE — 99232 SBSQ HOSP IP/OBS MODERATE 35: CPT | Performed by: INTERNAL MEDICINE

## 2025-05-15 PROCEDURE — 80069 RENAL FUNCTION PANEL: CPT | Performed by: INTERNAL MEDICINE

## 2025-05-15 PROCEDURE — 63710000001 INSULIN REGULAR HUMAN PER 5 UNITS: Performed by: STUDENT IN AN ORGANIZED HEALTH CARE EDUCATION/TRAINING PROGRAM

## 2025-05-15 PROCEDURE — 84132 ASSAY OF SERUM POTASSIUM: CPT | Performed by: INTERNAL MEDICINE

## 2025-05-15 PROCEDURE — 25010000002 METOCLOPRAMIDE PER 10 MG: Performed by: INTERNAL MEDICINE

## 2025-05-15 PROCEDURE — 82948 REAGENT STRIP/BLOOD GLUCOSE: CPT

## 2025-05-15 PROCEDURE — 25010000002 HEPARIN (PORCINE) PER 1000 UNITS: Performed by: STUDENT IN AN ORGANIZED HEALTH CARE EDUCATION/TRAINING PROGRAM

## 2025-05-15 PROCEDURE — 94660 CPAP INITIATION&MGMT: CPT

## 2025-05-15 PROCEDURE — 85025 COMPLETE CBC W/AUTO DIFF WBC: CPT | Performed by: INTERNAL MEDICINE

## 2025-05-15 PROCEDURE — 94799 UNLISTED PULMONARY SVC/PX: CPT

## 2025-05-15 PROCEDURE — 63710000001 INSULIN GLARGINE PER 5 UNITS: Performed by: STUDENT IN AN ORGANIZED HEALTH CARE EDUCATION/TRAINING PROGRAM

## 2025-05-15 RX ORDER — POTASSIUM CHLORIDE 1500 MG/1
40 TABLET, EXTENDED RELEASE ORAL EVERY 4 HOURS
Status: COMPLETED | OUTPATIENT
Start: 2025-05-15 | End: 2025-05-15

## 2025-05-15 RX ADMIN — HEPARIN SODIUM 5000 UNITS: 5000 INJECTION INTRAVENOUS; SUBCUTANEOUS at 21:06

## 2025-05-15 RX ADMIN — BUPROPION HYDROCHLORIDE 300 MG: 150 TABLET, EXTENDED RELEASE ORAL at 08:12

## 2025-05-15 RX ADMIN — Medication 10 ML: at 08:12

## 2025-05-15 RX ADMIN — EMPAGLIFLOZIN 25 MG: 25 TABLET, FILM COATED ORAL at 06:00

## 2025-05-15 RX ADMIN — LAMOTRIGINE 300 MG: 100 TABLET ORAL at 08:12

## 2025-05-15 RX ADMIN — LOSARTAN POTASSIUM 100 MG: 50 TABLET, FILM COATED ORAL at 08:12

## 2025-05-15 RX ADMIN — INSULIN GLARGINE 25 UNITS: 100 INJECTION, SOLUTION SUBCUTANEOUS at 08:12

## 2025-05-15 RX ADMIN — FLUOXETINE HYDROCHLORIDE 20 MG: 20 CAPSULE ORAL at 08:12

## 2025-05-15 RX ADMIN — HEPARIN SODIUM 5000 UNITS: 5000 INJECTION INTRAVENOUS; SUBCUTANEOUS at 05:58

## 2025-05-15 RX ADMIN — POTASSIUM CHLORIDE 40 MEQ: 1500 TABLET, EXTENDED RELEASE ORAL at 22:40

## 2025-05-15 RX ADMIN — METOCLOPRAMIDE 10 MG: 5 INJECTION, SOLUTION INTRAMUSCULAR; INTRAVENOUS at 02:15

## 2025-05-15 RX ADMIN — PANTOPRAZOLE SODIUM 40 MG: 40 INJECTION, POWDER, FOR SOLUTION INTRAVENOUS at 06:00

## 2025-05-15 RX ADMIN — PRAVASTATIN SODIUM 40 MG: 40 TABLET ORAL at 08:12

## 2025-05-15 RX ADMIN — INSULIN HUMAN 4 UNITS: 100 INJECTION, SOLUTION PARENTERAL at 12:55

## 2025-05-15 RX ADMIN — METOCLOPRAMIDE 10 MG: 5 INJECTION, SOLUTION INTRAMUSCULAR; INTRAVENOUS at 08:12

## 2025-05-15 RX ADMIN — ASPIRIN 81 MG: 81 TABLET, COATED ORAL at 08:12

## 2025-05-15 RX ADMIN — POTASSIUM CHLORIDE 40 MEQ: 1500 TABLET, EXTENDED RELEASE ORAL at 11:13

## 2025-05-15 RX ADMIN — POTASSIUM CHLORIDE 40 MEQ: 1500 TABLET, EXTENDED RELEASE ORAL at 18:49

## 2025-05-15 RX ADMIN — HEPARIN SODIUM 5000 UNITS: 5000 INJECTION INTRAVENOUS; SUBCUTANEOUS at 14:43

## 2025-05-15 RX ADMIN — POTASSIUM CHLORIDE 40 MEQ: 1500 TABLET, EXTENDED RELEASE ORAL at 05:58

## 2025-05-15 RX ADMIN — METOCLOPRAMIDE 10 MG: 5 INJECTION, SOLUTION INTRAMUSCULAR; INTRAVENOUS at 13:00

## 2025-05-15 NOTE — PLAN OF CARE
Goal Outcome Evaluation:  Plan of Care Reviewed With: patient        Progress: improving  Outcome Evaluation: Febrile this shift with T max 100.8. PRN tylenol given with improvement on temperature. Tolerated Clear liquid diet without c/o nausea

## 2025-05-15 NOTE — PLAN OF CARE
Goal Outcome Evaluation:  Plan of Care Reviewed With: patient        Progress: improving  Outcome Evaluation: Patient resting in bed with no signs and symptoms of distress noted. VSS. RA. NSR. Diet advance as tolerated. No acute changes and needs met all throughout the shift. Continue with POC.

## 2025-05-15 NOTE — PROGRESS NOTES
Nutrition Services    Patient Name:  Joselito Chowdary III  YOB: 1960  MRN: [id number]  Admit Date:  5/11/2025    Pt identified as NPO/Clear Liquid Diet >/=72 hrs. Noted pt w/ileus s/p SBFT advanced to CLD yesterday. Advance diet as medically appropriate, RD monitoring for diet advancement. Please consult RD if nutrition support indicated.      Electronically signed by:  Rhonda Maurice MS,RD,LD  05/15/25 08:15 EDT

## 2025-05-16 ENCOUNTER — READMISSION MANAGEMENT (OUTPATIENT)
Dept: CALL CENTER | Facility: HOSPITAL | Age: 65
End: 2025-05-16
Payer: COMMERCIAL

## 2025-05-16 VITALS
SYSTOLIC BLOOD PRESSURE: 108 MMHG | RESPIRATION RATE: 18 BRPM | DIASTOLIC BLOOD PRESSURE: 61 MMHG | TEMPERATURE: 97.8 F | WEIGHT: 285 LBS | BODY MASS INDEX: 40.8 KG/M2 | HEIGHT: 70 IN | HEART RATE: 90 BPM | OXYGEN SATURATION: 94 %

## 2025-05-16 PROBLEM — K56.7 ILEUS: Status: RESOLVED | Noted: 2025-05-10 | Resolved: 2025-05-16

## 2025-05-16 LAB
ALBUMIN SERPL-MCNC: 2.7 G/DL (ref 3.5–5.2)
ANION GAP SERPL CALCULATED.3IONS-SCNC: 10 MMOL/L (ref 5–15)
BASOPHILS # BLD AUTO: 0.05 10*3/MM3 (ref 0–0.2)
BASOPHILS NFR BLD AUTO: 0.6 % (ref 0–1.5)
BUN SERPL-MCNC: 4 MG/DL (ref 8–23)
BUN/CREAT SERPL: 5.2 (ref 7–25)
CALCIUM SPEC-SCNC: 8.2 MG/DL (ref 8.6–10.5)
CHLORIDE SERPL-SCNC: 102 MMOL/L (ref 98–107)
CO2 SERPL-SCNC: 25 MMOL/L (ref 22–29)
CREAT SERPL-MCNC: 0.77 MG/DL (ref 0.76–1.27)
DEPRECATED RDW RBC AUTO: 43.8 FL (ref 37–54)
EGFRCR SERPLBLD CKD-EPI 2021: 100.6 ML/MIN/1.73
EOSINOPHIL # BLD AUTO: 0.16 10*3/MM3 (ref 0–0.4)
EOSINOPHIL NFR BLD AUTO: 1.8 % (ref 0.3–6.2)
ERYTHROCYTE [DISTWIDTH] IN BLOOD BY AUTOMATED COUNT: 13.7 % (ref 12.3–15.4)
GLUCOSE BLDC GLUCOMTR-MCNC: 125 MG/DL (ref 70–130)
GLUCOSE BLDC GLUCOMTR-MCNC: 127 MG/DL (ref 70–130)
GLUCOSE BLDC GLUCOMTR-MCNC: 174 MG/DL (ref 70–130)
GLUCOSE SERPL-MCNC: 125 MG/DL (ref 65–99)
HCT VFR BLD AUTO: 40.1 % (ref 37.5–51)
HGB BLD-MCNC: 13.6 G/DL (ref 13–17.7)
IMM GRANULOCYTES # BLD AUTO: 0.35 10*3/MM3 (ref 0–0.05)
IMM GRANULOCYTES NFR BLD AUTO: 3.9 % (ref 0–0.5)
LYMPHOCYTES # BLD AUTO: 1.61 10*3/MM3 (ref 0.7–3.1)
LYMPHOCYTES NFR BLD AUTO: 17.9 % (ref 19.6–45.3)
MCH RBC QN AUTO: 29.5 PG (ref 26.6–33)
MCHC RBC AUTO-ENTMCNC: 33.9 G/DL (ref 31.5–35.7)
MCV RBC AUTO: 87 FL (ref 79–97)
MONOCYTES # BLD AUTO: 0.64 10*3/MM3 (ref 0.1–0.9)
MONOCYTES NFR BLD AUTO: 7.1 % (ref 5–12)
NEUTROPHILS NFR BLD AUTO: 6.17 10*3/MM3 (ref 1.7–7)
NEUTROPHILS NFR BLD AUTO: 68.7 % (ref 42.7–76)
NRBC BLD AUTO-RTO: 0 /100 WBC (ref 0–0.2)
PHOSPHATE SERPL-MCNC: 2.9 MG/DL (ref 2.5–4.5)
PLATELET # BLD AUTO: 359 10*3/MM3 (ref 140–450)
PMV BLD AUTO: 9.1 FL (ref 6–12)
POTASSIUM SERPL-SCNC: 3.9 MMOL/L (ref 3.5–5.2)
POTASSIUM SERPL-SCNC: 3.9 MMOL/L (ref 3.5–5.2)
RBC # BLD AUTO: 4.61 10*6/MM3 (ref 4.14–5.8)
SODIUM SERPL-SCNC: 137 MMOL/L (ref 136–145)
WBC NRBC COR # BLD AUTO: 8.98 10*3/MM3 (ref 3.4–10.8)

## 2025-05-16 PROCEDURE — 99239 HOSP IP/OBS DSCHRG MGMT >30: CPT | Performed by: NURSE PRACTITIONER

## 2025-05-16 PROCEDURE — 82948 REAGENT STRIP/BLOOD GLUCOSE: CPT

## 2025-05-16 PROCEDURE — 80069 RENAL FUNCTION PANEL: CPT | Performed by: INTERNAL MEDICINE

## 2025-05-16 PROCEDURE — 63710000001 INSULIN GLARGINE PER 5 UNITS: Performed by: STUDENT IN AN ORGANIZED HEALTH CARE EDUCATION/TRAINING PROGRAM

## 2025-05-16 PROCEDURE — 63710000001 INSULIN REGULAR HUMAN PER 5 UNITS: Performed by: STUDENT IN AN ORGANIZED HEALTH CARE EDUCATION/TRAINING PROGRAM

## 2025-05-16 PROCEDURE — 25010000002 HEPARIN (PORCINE) PER 1000 UNITS: Performed by: STUDENT IN AN ORGANIZED HEALTH CARE EDUCATION/TRAINING PROGRAM

## 2025-05-16 PROCEDURE — 84132 ASSAY OF SERUM POTASSIUM: CPT | Performed by: INTERNAL MEDICINE

## 2025-05-16 PROCEDURE — 85025 COMPLETE CBC W/AUTO DIFF WBC: CPT | Performed by: INTERNAL MEDICINE

## 2025-05-16 RX ORDER — ACETAMINOPHEN 325 MG/1
650 TABLET ORAL EVERY 6 HOURS PRN
Start: 2025-05-16

## 2025-05-16 RX ADMIN — FLUOXETINE HYDROCHLORIDE 20 MG: 20 CAPSULE ORAL at 09:24

## 2025-05-16 RX ADMIN — LOSARTAN POTASSIUM 100 MG: 50 TABLET, FILM COATED ORAL at 09:23

## 2025-05-16 RX ADMIN — LAMOTRIGINE 300 MG: 100 TABLET ORAL at 09:23

## 2025-05-16 RX ADMIN — INSULIN GLARGINE 25 UNITS: 100 INJECTION, SOLUTION SUBCUTANEOUS at 09:24

## 2025-05-16 RX ADMIN — HEPARIN SODIUM 5000 UNITS: 5000 INJECTION INTRAVENOUS; SUBCUTANEOUS at 05:33

## 2025-05-16 RX ADMIN — INSULIN HUMAN 2 UNITS: 100 INJECTION, SOLUTION PARENTERAL at 12:25

## 2025-05-16 RX ADMIN — EMPAGLIFLOZIN 25 MG: 25 TABLET, FILM COATED ORAL at 05:41

## 2025-05-16 RX ADMIN — PRAVASTATIN SODIUM 40 MG: 40 TABLET ORAL at 09:23

## 2025-05-16 RX ADMIN — ASPIRIN 81 MG: 81 TABLET, COATED ORAL at 09:23

## 2025-05-16 RX ADMIN — PANTOPRAZOLE SODIUM 40 MG: 40 INJECTION, POWDER, FOR SOLUTION INTRAVENOUS at 05:34

## 2025-05-16 RX ADMIN — Medication 10 ML: at 09:24

## 2025-05-16 RX ADMIN — BUPROPION HYDROCHLORIDE 300 MG: 150 TABLET, EXTENDED RELEASE ORAL at 09:23

## 2025-05-16 NOTE — DISCHARGE SUMMARY
[facility] Medicine Services  DISCHARGE SUMMARY    Patient Name: Joselito Chowdary III  : 1960  MRN: [id number]    Date of Admission: 2025  Date of Discharge:  2025  Primary Care Physician: Melissa Guerra APRN    Consults       Date and Time Order Name Status Description    2025  1:54 PM Inpatient General Surgery Consult Completed             Hospital Course     Presenting Problem:   Ileus [K56.7]    Active Hospital Problems    Diagnosis  POA    Anxiety [F41.9]  Yes    Dyslipidemia [E78.5]  Yes    Essential hypertension [I10]  Yes    Severe obstructive sleep apnea [G47.33]  Yes    Type 2 diabetes mellitus [E11.9]  Yes      Resolved Hospital Problems    Diagnosis Date Resolved POA    **Ileus [K56.7] 2025 Yes      Hospital Course:  Joselito Chowdary III is a 65 y.o. male PMH HTN, HLD, T2DM, GT admitted with persistent abdominal pain and vomiting started on 2025.  CT abdomen from this admission 2025 concerning for small bowel obstruction versus ileus.  General surgery evaluated, recommended nonoperative management for now.  NG tube placed to low wall suction with improvement of symptoms.  NG tube removed on .  Diet was advanced with patient tolerating it well.  Patient examined bowel movements and is eating without abdominal pain, nausea.    Assessment/Plan:  Abd Pain and vomiting  Ileus, possibly diabetic versus postinfectious motility impairment  - Precipitated by food poisoning/gastroenteritis.  Likely with component of diabetic gastroparesis  - CT abdomen concerning for ileus versus bowel obstruction, diagnosis ruled in favor of ileus  - NG tube placed and removed on   - Patient tolerated advance diet, was having bowel movements, denied nausea and vomiting.  - S/p SBFT on  showed no evidence of complete bowel obstruction  - GI PCR negative  - Reglan was discontinued    МАРИЯ  Hyponatremia  Metabolic acidosis  - Improved    HTN/HLD  -  Losartan, HCTZ was held  - Statin    GT    Discharge Follow Up Recommendations for labs/diagnostics:  Follow up with PCP May 21 @ 9:30 am    Day of Discharge     HPI:   Pt sitting up in bed stating he feels better and he is happy to have the NG tube. He reports bowel movements and states he is tolerating his diet without abdominal pain, nausea or vomiting.     Otherwise ROS is negative except as mentioned in the HPI.    Vital Signs:   Temp:  [97.8 °F (36.6 °C)-98.5 °F (36.9 °C)] 97.8 °F (36.6 °C)  Heart Rate:  [78-97] 90  Resp:  [18-20] 18  BP: (108-148)/(61-71) 108/61     Physical Exam:  Constitutional: Awake, alert, NAD  HENT: NCAT, mucous membranes moist  Respiratory: Clear to auscultation bilaterally, nonlabored  Cardiovascular: RRR, no murmurs, rubs, or gallops  Gastrointestinal: Positive bowel sounds, soft, nontender, nondistended  Musculoskeletal: No bilateral ankle edema  Psychiatric: Appropriate affect, cooperative  Neurologic: Oriented x 3, strength symmetric in all extremities, Cranial Nerves grossly intact to confrontation, speech clear  Skin: No rashes      Pertinent  and/or Most Recent Results     Results from last 7 days   Lab Units 05/16/25  0254 05/15/25  1437 05/15/25  0342 05/14/25  0413 05/13/25  0416 05/12/25  1555 05/12/25  0332 05/11/25  2124 05/11/25  0511   WBC 10*3/mm3 8.98  --  9.47 7.28 4.02  --  3.66 4.38 4.15   HEMOGLOBIN g/dL 13.6  --  13.5 13.5 13.1  --  14.5 14.9 14.5   HEMATOCRIT % 40.1  --  42.0 41.1 38.0  --  43.1 42.8 41.9   PLATELETS 10*3/mm3 359  --  356 339 291  --  308 288 243   SODIUM mmol/L 137  --  136 144 134*  --  131* 130* 132*   POTASSIUM mmol/L 3.9  3.9 3.5 3.5 3.9 3.7 3.3* 3.3* 3.8 3.5   CHLORIDE mmol/L 102  --  100 106 100  --  96* 93* 97*   CO2 mmol/L 25.0  --  21.0* 18.0* 21.0*  --  18.0* 18.0* 21.0*   BUN mg/dL 4*  --  5* 11 15  --  24* 24* 16   CREATININE mg/dL 0.77  --  0.74* 0.88 0.79  --  1.15 1.30* 1.02   GLUCOSE mg/dL 125*  --  124* 102* 194*  --  313*  "375* 270*   CALCIUM mg/dL 8.2*  --  8.1* 8.2* 7.8*  --  7.7* 7.7* 7.6*     Results from last 7 days   Lab Units 05/13/25  0416 05/11/25  2124 05/11/25  0511 05/10/25  1320   BILIRUBIN mg/dL 0.5 1.0 1.0 1.0   ALK PHOS U/L 50 56 54 56   ALT (SGPT) U/L 17 26 33 42*   AST (SGOT) U/L 12 11 15 35           Invalid input(s): \"TG\", \"LDLCALC\", \"LDLREALC\"  Results from last 7 days   Lab Units 05/11/25  2313 05/11/25  2124 05/11/25  0511 05/10/25  1242   HEMOGLOBIN A1C %  --   --  6.69*  --    HSTROP T ng/L 17 26*  --   --    LACTATE mmol/L  --  2.0  --  2.0       Brief Urine Lab Results  (Last result in the past 365 days)        Color   Clarity   Blood   Leuk Est   Nitrite   Protein   CREAT   Urine HCG        05/11/25 2138 Dark Yellow   Clear   Negative   Negative   Negative   30 mg/dL (1+)                   Microbiology Results Abnormal       None            Imaging Results (All)       Procedure Component Value Units Date/Time    FL Small Bowel Follow Through Single-Contrast [[id number]] Collected: 05/13/25 1106     Updated: 05/13/25 1541    Narrative:      FL SMALL BOWEL FOLLOW THROUGH SINGLE-CONTRAST    Date of Exam: 5/13/2025 8:30 AM EDT    Indication: ileus/SBO.       Comparison: None available.    Technique:   image of the abdomen was obtained. A single contrast study using thin barium was performed. Radiologic images of the small bowel were obtained at timed intervals, including fluoroscopic spot imaging.      Fluoroscopic Time: 0    Number of Images: 12    Findings:   imaging reveals an air-filled colon. 30-minute film shows contrast opacification of multiple loops of mildly prominent proximal small bowel. 60-minute film shows contrast opacification of multiple loops of mildly dilated proximal, and mid small   bowel, with multiple loops of grossly normal-appearing distal small bowel. Contrast was seen reaching the colon at 2 hours. Loops of dilated proximal to mid small bowel measure approximately 4 to 5 cm " in diameter. Multiple loops of mildly dilated   proximal to mid small bowel, with grossly normal-appearing distal small bowel, contrast seen within the colon at 2 hours likely represents a partial mid to distal small bowel obstruction, possibly ileus. There was no evidence of complete small bowel   obstruction.      Impression:      Impression:  Multiple loops of mildly dilated proximal to mid small bowel, with grossly normal-appearing distal small bowel, and contrast seen within the colon at 2 hours. Findings likely represent a partial mid to distal small bowel obstruction, possibly ileus.   There was no evidence of complete small bowel obstruction.      Report dictated by: Nidia Dempsey PA-c      I have personally reviewed this case and agree with the findings above:    Electronically Signed: Km Jean MD    5/13/2025 3:38 PM EDT    Workstation ID: [id number]    XR Abdomen KUB [[id number]] Collected: 05/12/25 0337     Updated: 05/12/25 0341    Narrative:      XR ABDOMEN KUB    Date of Exam: 5/12/2025 1:27 AM EDT    Indication: ng tube placement    Comparison: CT abdomen pelvis 5/11/2025    Findings:  NG tube tip is present near the fundus of the stomach. Cholecystectomy clips are present. There are some gas-filled small bowel loops in the upper abdomen.      Impression:      NG tube tip near the fundus of the stomach.        Electronically Signed: Marcos Koroma MD    5/12/2025 3:37 AM EDT    Workstation ID: [id number]    CT Abdomen Pelvis With Contrast [[id number]] Collected: 05/12/25 0015     Updated: 05/12/25 0022    Narrative:      CT ABDOMEN PELVIS W CONTRAST    Date of Exam: 5/11/2025 10:46 PM EDT    Indication: abdominal pain/nausea and vomiting.    Comparison: CT abdomen pelvis 5/10/2025    Technique: Axial CT images were obtained of the abdomen and pelvis following the uneventful intravenous administration of 85 mL Isovue-300. Reconstructed coronal and sagittal images were also obtained. Automated exposure  control and iterative   construction methods were used.      Findings:  Lung Bases:     There is airspace disease in the medial left lower lobe with an air bronchogram. This could be atelectasis or pneumonia. There is fluid in the distal esophagus which is likely from reflux.    Liver:  Liver is normal in size and CT density. No focal lesions.    Biliary/Gallbladder:    The gallbladder is surgically absent. The biliary tree is nondilated.    Spleen:  Spleen is normal in size and CT density.    Pancreas:    Pancreas is normal. There is no evidence of pancreatic mass or peripancreatic fluid.    Kidneys:    Kidneys are normal in size. There are no stones or hydronephrosis.    Adrenals:    Adrenal glands are unremarkable.    Retroperitoneal/Lymph Nodes/Vasculature:    No retroperitoneal adenopathy is identified.    Gastrointestinal/Mesentery:    There is gastric distention. There is distention of the small bowel to the level of the right lower quadrant. The distalmost small bowel is decompressed but there is no discrete transition point, rather a more gradual change to the level of the colon.   The entirety of the colon is normal. Findings are most likely secondary to adynamic ileus.    Bladder:    The bladder is normal.    Genital:     Unremarkable          Bony Structures:     Visualized bony structures are consistent with the patient's age.        Impression:      Impression:  1.Gastric distention and small bowel distention to the level of the right lower quadrant. The distal small bowel is decompressed but there is no discrete transition point. Findings are most likely secondary to adynamic ileus.  2.Left lower lobe airspace disease could be atelectasis or pneumonia.            Electronically Signed: Ethan Partida MD    5/12/2025 12:19 AM EDT    Workstation ID: [id number]    XR Chest 1 View [[id number]] Collected: 05/11/25 2202     Updated: 05/11/25 2205    Narrative:      XR CHEST 1 VW    Date of Exam: 5/11/2025 9:27  PM EDT    Indication: dyspnea    Comparison: Chest radiograph 7/12/2021    Findings:  There are no airspace consolidations. No pleural fluid. No pneumothorax. The pulmonary vasculature appears within normal limits. The cardiac and mediastinal silhouette appear unremarkable. No acute osseous abnormality identified.      Impression:      Impression:  No active disease.          Electronically Signed: Ethan Partida MD    5/11/2025 10:02 PM EDT    Workstation ID: [id number]                    Results for orders placed during the hospital encounter of 07/12/21    Adult Transthoracic Echo Complete W/ Cont if Necessary Per Protocol    Interpretation Summary  · Normal LV systolic function  · Mild MR      Pending Labs       Order Current Status    Blood Culture - Blood, Arm, Left Preliminary result    Blood Culture - Blood, Hand, Left Preliminary result          Discharge Details        Discharge Medications        New Medications        Instructions Start Date   acetaminophen 325 MG tablet  Commonly known as: TYLENOL   650 mg, Oral, Every 6 Hours PRN             Changes to Medications        Instructions Start Date   bumetanide 1 MG tablet  Commonly known as: BUMEX  What changed: Another medication with the same name was removed. Continue taking this medication, and follow the directions you see here.   1 mg, Oral, Daily      Jardiance 25 MG tablet tablet  Generic drug: empagliflozin  What changed: Another medication with the same name was removed. Continue taking this medication, and follow the directions you see here.   25 mg, Oral, Every Morning      Mounjaro 7.5 MG/0.5ML solution auto-injector  Generic drug: Tirzepatide  What changed: Another medication with the same name was removed. Continue taking this medication, and follow the directions you see here.   Inject 0.5 mL under the skin into the appropriate area as directed Every 7 (Seven) Days.      NovoLOG FlexPen 100 UNIT/ML solution pen-injector sc pen  Generic drug:  insulin aspart  What changed: Another medication with the same name was removed. Continue taking this medication, and follow the directions you see here.   Inject up to 42 units under the skin into the appropriate area in the morning before breakfast, 20 units before lunch/snack, & up to 65 units before dinner. (Plus 2 units to prime needle 3 times daily)             Continue These Medications        Instructions Start Date   Accu-Chek Guide Test test strip  Generic drug: glucose blood   Use As Directed 3 times daily      albuterol 108 (90 Base) MCG/ACT inhaler  Commonly known as: PROAIR RESPICLICK   2 puffs, Inhalation, Every 4 Hours PRN      ammonium lactate 12 % lotion  Commonly known as: LAC-HYDRIN   1 Application, Topical, 2 Times Daily      aspirin 81 MG EC tablet   81 mg, Daily      B-D ULTRAFINE III SHORT PEN 31G X 8 MM misc  Generic drug: Insulin Pen Needle   USE AS DIRECTED TO INJECT MEDICATION 4 TIMES DAILY      Pen Needles 31G X 5 MM misc   Use As Directed four times per day      buPROPion  MG 24 hr tablet  Commonly known as: Wellbutrin XL   300 mg, Oral, Daily      FLUoxetine 20 MG capsule  Commonly known as: PROzac   20 mg, Oral, Daily      lamoTRIgine 150 MG tablet  Commonly known as: LaMICtal   300 mg, Oral, Daily      metFORMIN  MG 24 hr tablet  Commonly known as: GLUCOPHAGE-XR   Take 2 tablets by mouth Every Morning AND 2 tablets Every Night.      pantoprazole 40 MG EC tablet  Commonly known as: PROTONIX   40 mg, Oral, Daily      pravastatin 40 MG tablet  Commonly known as: PRAVACHOL   Take 1 tablet every day by oral route for 90 days.             Stop These Medications      BASAGLULYSSES CRONIN SC     losartan 100 MG tablet  Commonly known as: COZAAR     losartan-hydrochlorothiazide 100-25 MG per tablet  Commonly known as: HYZAAR     ondansetron ODT 8 MG disintegrating tablet  Commonly known as: ZOFRAN-ODT     sucralfate 1 g tablet  Commonly known as: CARAFATE     Tresiba FlexTouch 200  UNIT/ML solution pen-injector pen injection  Generic drug: Insulin Degludec              Allergies   Allergen Reactions    Atorvastatin Other (See Comments)     Muscle pain    Zestoretic [Lisinopril-Hydrochlorothiazide] Cough         Discharge Disposition:  Home or Self Care    Discharge Diet:  Diet Order   Procedures    Diet: Diabetic; Consistent Carbohydrate; Fluid Consistency: Thin (IDDSI 0)         Discharge Activity:   Activity Instructions       Activity as Tolerated      Up WIth Assist                CODE STATUS:    Code Status and Medical Interventions: CPR (Attempt to Resuscitate); Full Support   Ordered at: 05/12/25 0113     Code Status (Patient has no pulse and is not breathing):    CPR (Attempt to Resuscitate)     Medical Interventions (Patient has pulse or is breathing):    Full Support         Future Appointments   Date Time Provider Department Center   9/9/2025  9:00 AM Marissa Asher APRN [facility] BRAIN       Additional Instructions for the Follow-ups that You Need to Schedule       Discharge Follow-up with PCP   As directed       Currently Documented PCP:    Melissa Guerra APRN    PCP Phone Number:    [phone]     Follow Up Details: Follow up with PCP in 1 week                Time Spent on Discharge:  55 minutes    Electronically signed by MELVIN Vaughn, 05/16/25, 11:20 AM EDT.

## 2025-05-16 NOTE — PLAN OF CARE
Goal Outcome Evaluation:  Plan of Care Reviewed With: patient        Progress: improving  Outcome Evaluation: Patient appeared restful this shift. No verbalized. Patient afebrile this shift.

## 2025-05-16 NOTE — OUTREACH NOTE
Prep Survey      Flowsheet Row Responses   [facility] facility patient discharged from? [facility]   Is LACE score < 7 ? No   Eligibility Readm Mgmt   Discharge diagnosis Ileus   Does the patient have one of the following disease processes/diagnoses(primary or secondary)? Other   Prep survey completed? Yes            Delores DAVIDSON - Registered Nurse

## 2025-05-16 NOTE — CASE MANAGEMENT/SOCIAL WORK
Continued Stay Note  [facility]     Patient Name: Joeslito Chowdary III  MRN: [id number]  Today's Date: 5/16/2025    Admit Date: 5/11/2025    Plan: Home   Discharge Plan       Row Name 05/16/25 0903       Plan    Plan Home    Patient/Family in Agreement with Plan yes    Plan Comments I spoke with Mr Chowdary at bedside.  At this time he continues to decline any discharge needs.  He is currently on a clear liquid diet, and his NG tube is out. Case management will continue to follow.    Final Discharge Disposition Code 01 - home or self-care                   Discharge Codes    No documentation.                 Expected Discharge Date and Time       Expected Discharge Date Expected Discharge Time    May 16, 2025               Nichelle Clemente RN

## 2025-05-17 LAB
BACTERIA SPEC AEROBE CULT: NORMAL
BACTERIA SPEC AEROBE CULT: NORMAL

## 2025-05-17 NOTE — PAYOR COMM NOTE
"Rosio Nowak III (65 y.o. Male)     [id number]     Tracy Lee, RN  Utilization Review  Hwnll-[phone]  Axn-[phone]        Date of Birth   1960    Social Security Number       Address   [address] [place], [place] [address]    Home Phone   [phone]    MRN   [id number]       Jew   Religious    Marital Status                               Admission Date   2025    Admission Type   Emergency    Admitting Provider   Natalie Webb MD    Attending Provider       Department, Room/Bed   [facility] 6B, N646/1       Discharge Date   2025    Discharge Disposition   Home or Self Care    Discharge Destination   Home                              Attending Provider: (none)   Allergies: Atorvastatin, Zestoretic [Lisinopril-hydrochlorothiazide]    Isolation: None   Infection: None   Code Status: Prior    Ht: 177.8 cm (70\")   Wt: 129 kg (285 lb)    Admission Cmt: None   Principal Problem: Ileus [K56.7]                   Active Insurance as of 2025       Primary Coverage       Payor Plan Insurance Group Employer/Plan Group    Critical access hospital BLUE CROSS [facility] EMPLOYEE [id number]       Payor Plan Address Payor Plan Phone Number Payor Plan Fax Number Effective Dates    [address] [phone]  2015 - None Entered    [place] [address]         Subscriber Name Subscriber Birth Date Member ID       ROSIO NOWAK III 1960 [id number]                     Emergency Contacts        (Rel.) Home Phone Work Phone Mobile Phone    Lucy Nowak (Spouse) [phone] -- [phone]                 Discharge Summary        Annemarie Greer APRN at 25 1120              [facility]  DISCHARGE SUMMARY    Patient Name: Rosio Nowak III  : 1960  MRN: [id number]    Date of Admission: 2025  Date of Discharge:  2025  Primary Care Physician: Melissa Guerra, " APRN    Consults       Date and Time Order Name Status Description    5/12/2025  1:54 PM Inpatient General Surgery Consult Completed             Hospital Course     Presenting Problem:   Ileus [K56.7]    Active Hospital Problems    Diagnosis  POA    Anxiety [F41.9]  Yes    Dyslipidemia [E78.5]  Yes    Essential hypertension [I10]  Yes    Severe obstructive sleep apnea [G47.33]  Yes    Type 2 diabetes mellitus [E11.9]  Yes      Resolved Hospital Problems    Diagnosis Date Resolved POA    **Ileus [K56.7] 05/16/2025 Yes      Hospital Course:  Joselito Chowdary III is a 65 y.o. male PMH HTN, HLD, T2DM, GT admitted with persistent abdominal pain and vomiting started on 5/20/2025.  CT abdomen from this admission 5/20/2025 concerning for small bowel obstruction versus ileus.  General surgery evaluated, recommended nonoperative management for now.  NG tube placed to low wall suction with improvement of symptoms.  NG tube removed on 5/14.  Diet was advanced with patient tolerating it well.  Patient examined bowel movements and is eating without abdominal pain, nausea.    Assessment/Plan:  Abd Pain and vomiting  Ileus, possibly diabetic versus postinfectious motility impairment  - Precipitated by food poisoning/gastroenteritis.  Likely with component of diabetic gastroparesis  - CT abdomen concerning for ileus versus bowel obstruction, diagnosis ruled in favor of ileus  - NG tube placed and removed on 5/14  - Patient tolerated advance diet, was having bowel movements, denied nausea and vomiting.  - S/p SBFT on 5/20 showed no evidence of complete bowel obstruction  - GI PCR negative  - Reglan was discontinued    МАРИЯ  Hyponatremia  Metabolic acidosis  - Improved    HTN/HLD  - Losartan, HCTZ was held  - Statin    GT    Discharge Follow Up Recommendations for labs/diagnostics:  Follow up with PCP May 21 @ 9:30 am    Day of Discharge     HPI:   Pt sitting up in bed stating he feels better and he is happy to have the NG tube. He  reports bowel movements and states he is tolerating his diet without abdominal pain, nausea or vomiting.     Otherwise ROS is negative except as mentioned in the HPI.    Vital Signs:   Temp:  [97.8 °F (36.6 °C)-98.5 °F (36.9 °C)] 97.8 °F (36.6 °C)  Heart Rate:  [78-97] 90  Resp:  [18-20] 18  BP: (108-148)/(61-71) 108/61     Physical Exam:  Constitutional: Awake, alert, NAD  HENT: NCAT, mucous membranes moist  Respiratory: Clear to auscultation bilaterally, nonlabored  Cardiovascular: RRR, no murmurs, rubs, or gallops  Gastrointestinal: Positive bowel sounds, soft, nontender, nondistended  Musculoskeletal: No bilateral ankle edema  Psychiatric: Appropriate affect, cooperative  Neurologic: Oriented x 3, strength symmetric in all extremities, Cranial Nerves grossly intact to confrontation, speech clear  Skin: No rashes      Pertinent  and/or Most Recent Results     Results from last 7 days   Lab Units 05/16/25  0254 05/15/25  1437 05/15/25  0342 05/14/25  0413 05/13/25  0416 05/12/25  1555 05/12/25  0332 05/11/25  2124 05/11/25  0511   WBC 10*3/mm3 8.98  --  9.47 7.28 4.02  --  3.66 4.38 4.15   HEMOGLOBIN g/dL 13.6  --  13.5 13.5 13.1  --  14.5 14.9 14.5   HEMATOCRIT % 40.1  --  42.0 41.1 38.0  --  43.1 42.8 41.9   PLATELETS 10*3/mm3 359  --  356 339 291  --  308 288 243   SODIUM mmol/L 137  --  136 144 134*  --  131* 130* 132*   POTASSIUM mmol/L 3.9  3.9 3.5 3.5 3.9 3.7 3.3* 3.3* 3.8 3.5   CHLORIDE mmol/L 102  --  100 106 100  --  96* 93* 97*   CO2 mmol/L 25.0  --  21.0* 18.0* 21.0*  --  18.0* 18.0* 21.0*   BUN mg/dL 4*  --  5* 11 15  --  24* 24* 16   CREATININE mg/dL 0.77  --  0.74* 0.88 0.79  --  1.15 1.30* 1.02   GLUCOSE mg/dL 125*  --  124* 102* 194*  --  313* 375* 270*   CALCIUM mg/dL 8.2*  --  8.1* 8.2* 7.8*  --  7.7* 7.7* 7.6*     Results from last 7 days   Lab Units 05/13/25  0416 05/11/25  2124 05/11/25  0511 05/10/25  1320   BILIRUBIN mg/dL 0.5 1.0 1.0 1.0   ALK PHOS U/L 50 56 54 56   ALT (SGPT) U/L 17 26  "33 42*   AST (SGOT) U/L 12 11 15 35           Invalid input(s): \"TG\", \"LDLCALC\", \"LDLREALC\"  Results from last 7 days   Lab Units 05/11/25  2313 05/11/25  2124 05/11/25  0511 05/10/25  1242   HEMOGLOBIN A1C %  --   --  6.69*  --    HSTROP T ng/L 17 26*  --   --    LACTATE mmol/L  --  2.0  --  2.0       Brief Urine Lab Results  (Last result in the past 365 days)        Color   Clarity   Blood   Leuk Est   Nitrite   Protein   CREAT   Urine HCG        05/11/25 2138 Dark Yellow   Clear   Negative   Negative   Negative   30 mg/dL (1+)                   Microbiology Results Abnormal       None            Imaging Results (All)       Procedure Component Value Units Date/Time    FL Small Bowel Follow Through Single-Contrast [[id number]] Collected: 05/13/25 1106     Updated: 05/13/25 1541    Narrative:      FL SMALL BOWEL FOLLOW THROUGH SINGLE-CONTRAST    Date of Exam: 5/13/2025 8:30 AM EDT    Indication: ileus/SBO.       Comparison: None available.    Technique:   image of the abdomen was obtained. A single contrast study using thin barium was performed. Radiologic images of the small bowel were obtained at timed intervals, including fluoroscopic spot imaging.      Fluoroscopic Time: 0    Number of Images: 12    Findings:   imaging reveals an air-filled colon. 30-minute film shows contrast opacification of multiple loops of mildly prominent proximal small bowel. 60-minute film shows contrast opacification of multiple loops of mildly dilated proximal, and mid small   bowel, with multiple loops of grossly normal-appearing distal small bowel. Contrast was seen reaching the colon at 2 hours. Loops of dilated proximal to mid small bowel measure approximately 4 to 5 cm in diameter. Multiple loops of mildly dilated   proximal to mid small bowel, with grossly normal-appearing distal small bowel, contrast seen within the colon at 2 hours likely represents a partial mid to distal small bowel obstruction, possibly ileus. There " was no evidence of complete small bowel   obstruction.      Impression:      Impression:  Multiple loops of mildly dilated proximal to mid small bowel, with grossly normal-appearing distal small bowel, and contrast seen within the colon at 2 hours. Findings likely represent a partial mid to distal small bowel obstruction, possibly ileus.   There was no evidence of complete small bowel obstruction.      Report dictated by: Nidia Dempsey PA-c      I have personally reviewed this case and agree with the findings above:    Electronically Signed: Km Jean MD    5/13/2025 3:38 PM EDT    Workstation ID: [id number]    XR Abdomen KUB [[id number]] Collected: 05/12/25 0337     Updated: 05/12/25 0341    Narrative:      XR ABDOMEN KUB    Date of Exam: 5/12/2025 1:27 AM EDT    Indication: ng tube placement    Comparison: CT abdomen pelvis 5/11/2025    Findings:  NG tube tip is present near the fundus of the stomach. Cholecystectomy clips are present. There are some gas-filled small bowel loops in the upper abdomen.      Impression:      NG tube tip near the fundus of the stomach.        Electronically Signed: Marcos Koroma MD    5/12/2025 3:37 AM EDT    Workstation ID: [id number]    CT Abdomen Pelvis With Contrast [[id number]] Collected: 05/12/25 0015     Updated: 05/12/25 0022    Narrative:      CT ABDOMEN PELVIS W CONTRAST    Date of Exam: 5/11/2025 10:46 PM EDT    Indication: abdominal pain/nausea and vomiting.    Comparison: CT abdomen pelvis 5/10/2025    Technique: Axial CT images were obtained of the abdomen and pelvis following the uneventful intravenous administration of 85 mL Isovue-300. Reconstructed coronal and sagittal images were also obtained. Automated exposure control and iterative   construction methods were used.      Findings:  Lung Bases:     There is airspace disease in the medial left lower lobe with an air bronchogram. This could be atelectasis or pneumonia. There is fluid in the distal esophagus which is  likely from reflux.    Liver:  Liver is normal in size and CT density. No focal lesions.    Biliary/Gallbladder:    The gallbladder is surgically absent. The biliary tree is nondilated.    Spleen:  Spleen is normal in size and CT density.    Pancreas:    Pancreas is normal. There is no evidence of pancreatic mass or peripancreatic fluid.    Kidneys:    Kidneys are normal in size. There are no stones or hydronephrosis.    Adrenals:    Adrenal glands are unremarkable.    Retroperitoneal/Lymph Nodes/Vasculature:    No retroperitoneal adenopathy is identified.    Gastrointestinal/Mesentery:    There is gastric distention. There is distention of the small bowel to the level of the right lower quadrant. The distalmost small bowel is decompressed but there is no discrete transition point, rather a more gradual change to the level of the colon.   The entirety of the colon is normal. Findings are most likely secondary to adynamic ileus.    Bladder:    The bladder is normal.    Genital:     Unremarkable          Bony Structures:     Visualized bony structures are consistent with the patient's age.        Impression:      Impression:  1.Gastric distention and small bowel distention to the level of the right lower quadrant. The distal small bowel is decompressed but there is no discrete transition point. Findings are most likely secondary to adynamic ileus.  2.Left lower lobe airspace disease could be atelectasis or pneumonia.            Electronically Signed: Ethan Partida MD    5/12/2025 12:19 AM EDT    Workstation ID: [id number]    XR Chest 1 View [[id number]] Collected: 05/11/25 2202     Updated: 05/11/25 2205    Narrative:      XR CHEST 1 VW    Date of Exam: 5/11/2025 9:27 PM EDT    Indication: dyspnea    Comparison: Chest radiograph 7/12/2021    Findings:  There are no airspace consolidations. No pleural fluid. No pneumothorax. The pulmonary vasculature appears within normal limits. The cardiac and mediastinal silhouette  appear unremarkable. No acute osseous abnormality identified.      Impression:      Impression:  No active disease.          Electronically Signed: Ethan Partida MD    5/11/2025 10:02 PM EDT    Workstation ID: [id number]                    Results for orders placed during the hospital encounter of 07/12/21    Adult Transthoracic Echo Complete W/ Cont if Necessary Per Protocol    Interpretation Summary  · Normal LV systolic function  · Mild MR      Pending Labs       Order Current Status    Blood Culture - Blood, Arm, Left Preliminary result    Blood Culture - Blood, Hand, Left Preliminary result          Discharge Details        Discharge Medications        New Medications        Instructions Start Date   acetaminophen 325 MG tablet  Commonly known as: TYLENOL   650 mg, Oral, Every 6 Hours PRN             Changes to Medications        Instructions Start Date   bumetanide 1 MG tablet  Commonly known as: BUMEX  What changed: Another medication with the same name was removed. Continue taking this medication, and follow the directions you see here.   1 mg, Oral, Daily      Jardiance 25 MG tablet tablet  Generic drug: empagliflozin  What changed: Another medication with the same name was removed. Continue taking this medication, and follow the directions you see here.   25 mg, Oral, Every Morning      Mounjaro 7.5 MG/0.5ML solution auto-injector  Generic drug: Tirzepatide  What changed: Another medication with the same name was removed. Continue taking this medication, and follow the directions you see here.   Inject 0.5 mL under the skin into the appropriate area as directed Every 7 (Seven) Days.      NovoLOG FlexPen 100 UNIT/ML solution pen-injector sc pen  Generic drug: insulin aspart  What changed: Another medication with the same name was removed. Continue taking this medication, and follow the directions you see here.   Inject up to 42 units under the skin into the appropriate area in the morning before breakfast, 20  units before lunch/snack, & up to 65 units before dinner. (Plus 2 units to prime needle 3 times daily)             Continue These Medications        Instructions Start Date   Accu-Chek Guide Test test strip  Generic drug: glucose blood   Use As Directed 3 times daily      albuterol 108 (90 Base) MCG/ACT inhaler  Commonly known as: PROAIR RESPICLICK   2 puffs, Inhalation, Every 4 Hours PRN      ammonium lactate 12 % lotion  Commonly known as: LAC-HYDRIN   1 Application, Topical, 2 Times Daily      aspirin 81 MG EC tablet   81 mg, Daily      B-D ULTRAFINE III SHORT PEN 31G X 8 MM misc  Generic drug: Insulin Pen Needle   USE AS DIRECTED TO INJECT MEDICATION 4 TIMES DAILY      Pen Needles 31G X 5 MM misc   Use As Directed four times per day      buPROPion  MG 24 hr tablet  Commonly known as: Wellbutrin XL   300 mg, Oral, Daily      FLUoxetine 20 MG capsule  Commonly known as: PROzac   20 mg, Oral, Daily      lamoTRIgine 150 MG tablet  Commonly known as: LaMICtal   300 mg, Oral, Daily      metFORMIN  MG 24 hr tablet  Commonly known as: GLUCOPHAGE-XR   Take 2 tablets by mouth Every Morning AND 2 tablets Every Night.      pantoprazole 40 MG EC tablet  Commonly known as: PROTONIX   40 mg, Oral, Daily      pravastatin 40 MG tablet  Commonly known as: PRAVACHOL   Take 1 tablet every day by oral route for 90 days.             Stop These Medications      BASAGLAR KWIKPEN SC     losartan 100 MG tablet  Commonly known as: COZAAR     losartan-hydrochlorothiazide 100-25 MG per tablet  Commonly known as: HYZAAR     ondansetron ODT 8 MG disintegrating tablet  Commonly known as: ZOFRAN-ODT     sucralfate 1 g tablet  Commonly known as: CARAFATE     Tresiba FlexTouch 200 UNIT/ML solution pen-injector pen injection  Generic drug: Insulin Degludec              Allergies   Allergen Reactions    Atorvastatin Other (See Comments)     Muscle pain    Zestoretic [Lisinopril-Hydrochlorothiazide] Cough         Discharge  Disposition:  Home or Self Care    Discharge Diet:  Diet Order   Procedures    Diet: Diabetic; Consistent Carbohydrate; Fluid Consistency: Thin (IDDSI 0)         Discharge Activity:   Activity Instructions       Activity as Tolerated      Up WIth Assist                CODE STATUS:    Code Status and Medical Interventions: CPR (Attempt to Resuscitate); Full Support   Ordered at: 05/12/25 0113     Code Status (Patient has no pulse and is not breathing):    CPR (Attempt to Resuscitate)     Medical Interventions (Patient has pulse or is breathing):    Full Support         Future Appointments   Date Time Provider Department Center   9/9/2025  9:00 AM Marissa Asher APRN E  [facility]       Additional Instructions for the Follow-ups that You Need to Schedule       Discharge Follow-up with PCP   As directed       Currently Documented PCP:    Melissa Guerra APRN    PCP Phone Number:    [phone]     Follow Up Details: Follow up with PCP in 1 week                Time Spent on Discharge:  55 minutes    Electronically signed by MELVIN Vaughn, 05/16/25, 11:20 AM EDT.       Electronically signed by Annemarie Greer APRN at 05/16/25 9547

## 2025-05-20 ENCOUNTER — READMISSION MANAGEMENT (OUTPATIENT)
Dept: CALL CENTER | Facility: HOSPITAL | Age: 65
End: 2025-05-20
Payer: COMMERCIAL

## 2025-05-20 NOTE — OUTREACH NOTE
Medical Week 1 Survey      Flowsheet Row Responses   [facility] patient discharged from? [place]   Does the patient have one of the following disease processes/diagnoses(primary or secondary)? Other   Week 1 attempt successful? No   Unsuccessful attempts Attempt 1  [Attempted patient/spouse- analia dalton]            Delores DAVIDSON - Registered Nurse

## 2025-05-28 ENCOUNTER — READMISSION MANAGEMENT (OUTPATIENT)
Dept: CALL CENTER | Facility: HOSPITAL | Age: 65
End: 2025-05-28
Payer: COMMERCIAL

## 2025-05-28 NOTE — OUTREACH NOTE
Medical Week 2 Survey      Flowsheet Row Responses   [facility] [place] patient discharged from? [place]   Does the patient have one of the following disease processes/diagnoses(primary or secondary)? Other   Week 2 attempt successful? No   Unsuccessful attempts Attempt 1            Ann TURNER - Registered Nurse

## 2025-06-02 ENCOUNTER — READMISSION MANAGEMENT (OUTPATIENT)
Dept: CALL CENTER | Facility: HOSPITAL | Age: 65
End: 2025-06-02
Payer: COMMERCIAL

## 2025-06-02 NOTE — OUTREACH NOTE
Medical Week 2 Survey      Flowsheet Row Responses   [facility] patient discharged from? [place]   Does the patient have one of the following disease processes/diagnoses(primary or secondary)? Other   Week 2 attempt successful? No   Unsuccessful attempts Attempt 2   Revoke Other  [unable to reach x3 attempts]            Sosa MOORE - Registered Nurse

## 2025-06-16 ENCOUNTER — TELEPHONE (OUTPATIENT)
Dept: ORTHOPEDIC SURGERY | Facility: CLINIC | Age: 65
End: 2025-06-16
Payer: COMMERCIAL

## 2025-06-16 NOTE — TELEPHONE ENCOUNTER
Patient called stating that he is having difficulty walking and bearing weight on his left knee, patient is scheduled to see Dr Mares on 6/20/25    Please advise, thank you

## 2025-06-16 NOTE — TELEPHONE ENCOUNTER
I called patient, he has not been seen since November 2024. I suggested he ice, elevate and use compression on the knee as needed. I also suggested he add an NSAID as well but he is on a daily aspirin (not prescribed). I advised him to call his PCP and see if they are ok with him taking an NSAID, He was agreeable to this and will call with any further questions or concerns.  Carley Mills RT (R), ROT

## 2025-06-20 ENCOUNTER — OFFICE VISIT (OUTPATIENT)
Age: 65
End: 2025-06-20
Payer: COMMERCIAL

## 2025-06-20 VITALS — SYSTOLIC BLOOD PRESSURE: 130 MMHG | DIASTOLIC BLOOD PRESSURE: 80 MMHG

## 2025-06-20 DIAGNOSIS — M17.0 PRIMARY OSTEOARTHRITIS OF BOTH KNEES: Primary | ICD-10-CM

## 2025-06-20 PROCEDURE — 99213 OFFICE O/P EST LOW 20 MIN: CPT | Performed by: ORTHOPAEDIC SURGERY

## 2025-06-20 NOTE — PROGRESS NOTES
[facility] [place] [facility]opa[facility] Surgery Clinic Note    Subjective     Chief Complaint   Patient presents with    Follow-up     7 month follow up -- Primary osteoarthritis of both knees         HPI    It has been 7  month(s) since Mr. Chowdary's last visit. He returns to clinic today for follow-up of bilateral knee pain. The issue has been ongoing for 2 year(s). He rates his pain a 4/10 on the pain scale. Previous/current treatments: bracing and steroid injection (last injection 7/8/24). Current symptoms: pain, swelling, and stiffness. The pain is worse with climbing stairs; resting improve the pain. Overall, he is doing the same.  He recently tried Motrin, bracing, and ice with some relief.  He was asking about possible viscosupplementation injections.      I have reviewed the following portions of the patient's history and agree with: History of Present Illness and Review of Systems    Patient Active Problem List   Diagnosis    Severe obstructive sleep apnea    Morbid obesity    Hypersomnia    Type 2 diabetes mellitus    Essential hypertension    Dyslipidemia    Snoring    Exertional dyspnea    Dry cough    Edema of both lower extremities    Chest pain    Bipolar 1 disorder    Anxiety    Unstable angina    Allergic rhinitis    Peripheral angiopathy due to type 2 diabetes mellitus    Steatosis of liver     Past Medical History:   Diagnosis Date    Acromioclavicular separation 04/2018    Same as above problem    Bipolar 1 disorder     Diabetes mellitus     Dislocation, shoulder 04/2018    Same    Diverticulitis     Hyperlipidemia     Hypertension     Knee swelling     On first visit    Mood disorder     Respiratory infection     Rotator cuff syndrome 04/2018    R Shoulder      Past Surgical History:   Procedure Laterality Date    CARDIAC CATHETERIZATION  2012    CARDIAC CATHETERIZATION N/A 07/13/2021    Procedure: Left Heart Cath;  Surgeon: Lois Levy MD;  Location: [facility] CATH INVASIVE LOCATION;  Service: Cardiology;   Laterality: N/A;    CHOLECYSTECTOMY      COLONOSCOPY      GALLBLADDER SURGERY      SHOULDER SURGERY Right     REPAIR     TRIGGER POINT INJECTION      First and subsequent visits      Family History   Problem Relation Age of Onset    Cancer Mother     Hypertension Mother     Deep vein thrombosis Mother     Diabetes Father     Heart disease Father     Hypertension Father     COPD Father     Heart failure Father      Social History     Socioeconomic History    Marital status:    Tobacco Use    Smoking status: Former     Current packs/day: 0.00     Average packs/day: 1 pack/day for 10.0 years (10.0 ttl pk-yrs)     Types: Cigarettes     Start date: 1978     Quit date: 1986     Years since quittin.4    Smokeless tobacco: Never   Vaping Use    Vaping status: Never Used   Substance and Sexual Activity    Alcohol use: Not Currently     Comment: Occasional drink    Drug use: Never    Sexual activity: Not Currently     Partners: Female     Birth control/protection: None      Current Outpatient Medications on File Prior to Visit   Medication Sig Dispense Refill    acetaminophen (TYLENOL) 325 MG tablet Take 2 tablets by mouth Every 6 (Six) Hours As Needed for Mild Pain.      albuterol (PROAIR RESPICLICK) 108 (90 Base) MCG/ACT inhaler Inhale 2 puffs Every 4 (Four) Hours As Needed for Wheezing or Shortness of Air. 1 each 0    ammonium lactate (LAC-HYDRIN) 12 % lotion Apply 1 Application topically to the appropriate area as directed 2 (Two) Times a Day. 400 g 1    aspirin 81 MG EC tablet Take 1 tablet by mouth Daily.      B-D ULTRAFINE III SHORT PEN 31G X 8 MM misc USE AS DIRECTED TO INJECT MEDICATION 4 TIMES DAILY      bumetanide (BUMEX) 1 MG tablet Take 1 tablet by mouth Daily. 90 tablet 0    buPROPion XL (Wellbutrin XL) 300 MG 24 hr tablet Take 1 tablet by mouth Daily. 30 tablet 5    empagliflozin (Jardiance) 25 MG tablet tablet Take 1 tablet by mouth Every Morning. 90 tablet 0    FLUoxetine (PROzac) 20 MG  "capsule Take 1 capsule by mouth Daily. 30 capsule 5    glucose blood (Accu-Chek Guide Test) test strip Use As Directed 3 times daily 300 each 1    insulin aspart (NovoLOG FlexPen) 100 UNIT/ML solution pen-injector sc pen Inject up to 42 units under the skin into the appropriate area in the morning before breakfast, 20 units before lunch/snack, & up to 65 units before dinner. (Plus 2 units to prime needle 3 times daily) 120 mL 0    Insulin Pen Needle (Pen Needles 3/16\") 31G X 5 MM misc Use As Directed four times per day 400 each 1    lamoTRIgine (LaMICtal) 150 MG tablet Take 2 tablets by mouth Daily. 60 tablet 5    metFORMIN ER (GLUCOPHAGE-XR) 500 MG 24 hr tablet Take 2 tablets by mouth Every Morning AND 2 tablets Every Night. 360 tablet 0    pantoprazole (PROTONIX) 40 MG EC tablet Take 1 tablet by mouth Daily. 30 tablet 0    Tirzepatide 7.5 MG/0.5ML solution auto-injector Inject 0.5 mL under the skin into the appropriate area as directed Every 7 (Seven) Days. 2 mL 2    pravastatin (PRAVACHOL) 40 MG tablet Take 1 tablet every day by oral route for 90 days. 90 tablet 1     No current facility-administered medications on file prior to visit.      Allergies   Allergen Reactions    Atorvastatin Other (See Comments)     Muscle pain    Zestoretic [Lisinopril-Hydrochlorothiazide] Cough        Review of Systems   Constitutional:  Negative for activity change, appetite change, chills, diaphoresis, fatigue, fever and unexpected weight change.   HENT:  Negative for congestion, dental problem, drooling, ear discharge, ear pain, facial swelling, hearing loss, mouth sores, nosebleeds, postnasal drip, rhinorrhea, sinus pressure, sneezing, sore throat, tinnitus, trouble swallowing and voice change.    Eyes:  Negative for photophobia, pain, discharge, redness, itching and visual disturbance.   Respiratory:  Negative for apnea, cough, choking, chest tightness, shortness of breath, wheezing and stridor.    Cardiovascular:  Negative for " chest pain, palpitations and leg swelling.   Gastrointestinal:  Negative for abdominal distention, abdominal pain, anal bleeding, blood in stool, constipation, diarrhea, nausea, rectal pain and vomiting.   Endocrine: Negative for cold intolerance, heat intolerance, polydipsia, polyphagia and polyuria.   Genitourinary:  Negative for decreased urine volume, difficulty urinating, dysuria, enuresis, flank pain, frequency, genital sores, hematuria and urgency.   Musculoskeletal:  Positive for arthralgias. Negative for back pain, gait problem, joint swelling, myalgias, neck pain and neck stiffness.   Skin:  Negative for color change, pallor, rash and wound.   Allergic/Immunologic: Negative for environmental allergies, food allergies and immunocompromised state.   Neurological:  Negative for dizziness, tremors, seizures, syncope, facial asymmetry, speech difficulty, weakness, light-headedness, numbness and headaches.   Hematological:  Negative for adenopathy. Does not bruise/bleed easily.   Psychiatric/Behavioral:  Negative for agitation, behavioral problems, confusion, decreased concentration, dysphoric mood, hallucinations, self-injury, sleep disturbance and suicidal ideas. The patient is not nervous/anxious and is not hyperactive.         Objective      Physical Exam  /80     There is no height or weight on file to calculate BMI.         General:   Mental Status:  Alert   Appearance: Cooperative, in no acute distress   Build and Nutrition: Obese by BMI male   Orientation: Alert and oriented to person, place and time   Posture: Normal   Gait: Nonantalgic    Integument:   Right knee: no skin lesions, no rash, no ecchymosis   Left knee: no skin lesions, no rash, no ecchymosis    Lower Extremities:   Right Knee:    Tenderness:  None    Effusion:  None    Swelling:  None    Crepitus:  Positive    Atrophy:  None    Range of motion:  Extension: 0°       Flexion: 120°  Instability:  No varus laxity, no valgus laxity,  negative anterior drawer  Deformities:  Varus   Left Knee:    Tenderness:  None    Effusion:  None    Swelling:  None    Crepitus: Positive    Atrophy:  None    Range of motion:  Extension: 0°       Flexion: 120°  Instability:  No varus laxity, no valgus laxity, negative anterior drawer  Deformities:  Varus      Imaging/Studies  Imaging Results (Last 24 Hours)       ** No results found for the last 24 hours. **          No new imaging today.    Assessment and Plan     Diagnoses and all orders for this visit:    1. Primary osteoarthritis of both knees (Primary)  -     Large Joint Arthrocentesis        1. Primary osteoarthritis of both knees          I reviewed my findings with the patient.  He was inquiring about viscosupplementation injections, and he is a candidate.  He has had several steroid injections in the past.  He has also done a home exercise program, as well as used anti-inflammatories, icing, and bracing.  We will go ahead and submit for approval, and I will see him back once they are ready for administration.  I will see him back sooner for any problems.    Return for after approval of visco injection(s).      Nazario Mares MD  06/20/25  09:55 EDT    Dictated Utilizing Dragon Dictation

## 2025-07-30 ENCOUNTER — CLINICAL SUPPORT (OUTPATIENT)
Dept: ORTHOPEDIC SURGERY | Facility: CLINIC | Age: 65
End: 2025-07-30
Payer: COMMERCIAL

## 2025-07-30 DIAGNOSIS — M17.0 PRIMARY OSTEOARTHRITIS OF BOTH KNEES: Primary | ICD-10-CM

## 2025-07-30 RX ORDER — HYALURONATE SODIUM 20 MG/2 ML
SYRINGE (ML) INTRAARTICULAR
COMMUNITY
Start: 2025-07-17

## 2025-07-30 NOTE — PROGRESS NOTES
Procedure   - Large Joint Arthrocentesis: bilateral knee on 7/30/2025 8:41 AM  Indications: pain  Details: 21 G needle, superolateral approach  Medications (Right): 20 mg Sodium Hyaluronate (Viscosup) 20 MG/2ML  Medications (Left): 20 mg Sodium Hyaluronate (Viscosup) 20 MG/2ML  Outcome: tolerated well, no immediate complications  Procedure, treatment alternatives, risks and benefits explained, specific risks discussed. Consent was given by the patient. Immediately prior to procedure a time out was called to verify the correct patient, procedure, equipment, support staff and site/side marked as required. Patient was prepped and draped in the usual sterile fashion.

## 2025-07-30 NOTE — PROGRESS NOTES
[facility] [place] [place] [facility] [place]    Subjective     Chief Complaint   Patient presents with    Injections     Bilateral knees Euflexxa #1 injections        HPI    Joselito Chowdary III is a 65 y.o. male who presents for the first Euflexxa injections into both knees.    Patient Active Problem List   Diagnosis    Severe obstructive sleep apnea    Morbid obesity    Hypersomnia    Type 2 diabetes mellitus    Essential hypertension    Dyslipidemia    Snoring    Exertional dyspnea    Dry cough    Edema of both lower extremities    Chest pain    Bipolar 1 disorder    Anxiety    Unstable angina    Allergic rhinitis    Peripheral angiopathy due to type 2 diabetes mellitus    Steatosis of liver     Past Medical History:   Diagnosis Date    Acromioclavicular separation 04/2018    Same as above problem    Bipolar 1 disorder     Diabetes mellitus     Dislocation, shoulder 04/2018    Same    Diverticulitis     Hyperlipidemia     Hypertension     Knee swelling     On first visit    Mood disorder     Respiratory infection     Rotator cuff syndrome 04/2018    R Shoulder      Past Surgical History:   Procedure Laterality Date    CARDIAC CATHETERIZATION  2012    CARDIAC CATHETERIZATION N/A 07/13/2021    Procedure: Left Heart Cath;  Surgeon: Lois Levy MD;  Location: [facility] CATH INVASIVE LOCATION;  Service: Cardiology;  Laterality: N/A;    CHOLECYSTECTOMY      COLONOSCOPY      GALLBLADDER SURGERY      SHOULDER SURGERY Right     REPAIR     TRIGGER POINT INJECTION      First and subsequent visits      Family History   Problem Relation Age of Onset    Cancer Mother     Hypertension Mother     Deep vein thrombosis Mother     Diabetes Father     Heart disease Father     Hypertension Father     COPD Father     Heart failure Father      Social History     Socioeconomic History    Marital status:    Tobacco Use    Smoking status: Former     Current packs/day: 0.00     Average packs/day: 1 pack/day for 10.0 years (10.0  ttl pk-yrs)     Types: Cigarettes     Start date: 1978     Quit date: 1986     Years since quittin.6    Smokeless tobacco: Never   Vaping Use    Vaping status: Never Used   Substance and Sexual Activity    Alcohol use: Not Currently     Comment: Occasional drink    Drug use: Never    Sexual activity: Not Currently     Partners: Female     Birth control/protection: None      Current Outpatient Medications on File Prior to Visit   Medication Sig Dispense Refill    acetaminophen (TYLENOL) 325 MG tablet Take 2 tablets by mouth Every 6 (Six) Hours As Needed for Mild Pain.      albuterol (PROAIR RESPICLICK) 108 (90 Base) MCG/ACT inhaler Inhale 2 puffs Every 4 (Four) Hours As Needed for Wheezing or Shortness of Air. 1 each 0    albuterol sulfate HFA (ProAir HFA) 108 (90 Base) MCG/ACT inhaler Inhale 1-2 puffs Every 4 (Four) to 6 (Six) Hours As Needed. 8.5 g 4    ammonium lactate (LAC-HYDRIN) 12 % lotion Apply 1 Application topically to the appropriate area as directed 2 (Two) Times a Day. 400 g 1    aspirin 81 MG EC tablet Take 1 tablet by mouth Daily.      B-D ULTRAFINE III SHORT PEN 31G X 8 MM misc USE AS DIRECTED TO INJECT MEDICATION 4 TIMES DAILY      bumetanide (BUMEX) 1 MG tablet Take 1 tablet by mouth Daily. 90 tablet 0    bumetanide (BUMEX) 1 MG tablet Take 1 tablet by mouth Daily. 90 tablet 0    buPROPion XL (Wellbutrin XL) 300 MG 24 hr tablet Take 1 tablet by mouth Daily. 30 tablet 5    empagliflozin (Jardiance) 25 MG tablet tablet Take 1 tablet by mouth Every Morning. 90 tablet 0    Euflexxa 20 MG/2ML solution prefilled syringe       FLUoxetine (PROzac) 20 MG capsule Take 1 capsule by mouth Daily. 30 capsule 5    glucose blood (Accu-Chek Guide Test) test strip Use As Directed 3 times daily 300 each 1    insulin aspart (NovoLOG FlexPen) 100 UNIT/ML solution pen-injector sc pen Inject up to 42 units under the skin into the appropriate area in the morning before breakfast, 20 units before lunch/snack, &  "up to 65 units before dinner. (Plus 2 units to prime needle 3 times daily) 120 mL 0    Insulin Pen Needle (Pen Needles 3/16\") 31G X 5 MM misc Use As Directed four times per day 400 each 1    lamoTRIgine (LaMICtal) 150 MG tablet Take 2 tablets by mouth Daily. 60 tablet 5    metFORMIN ER (GLUCOPHAGE-XR) 500 MG 24 hr tablet Take 2 tablets by mouth 2 (Two) Times a Day (morning and night) 360 tablet 0    pantoprazole (PROTONIX) 40 MG EC tablet Take 1 tablet by mouth Daily. 30 tablet 0    pravastatin (PRAVACHOL) 40 MG tablet Take 1 tablet every day by oral route for 90 days. 90 tablet 1    Tirzepatide (Mounjaro) 7.5 MG/0.5ML solution auto-injector Inject 7.5 mg under the skin into the appropriate area as directed Every 7 (Seven) Days. 2 mL 1    Tirzepatide 7.5 MG/0.5ML solution auto-injector Inject 0.5 mL under the skin into the appropriate area as directed Every 7 (Seven) Days. 2 mL 2     No current facility-administered medications on file prior to visit.      Allergies   Allergen Reactions    Atorvastatin Other (See Comments)     Muscle pain    Zestoretic [Lisinopril-Hydrochlorothiazide] Cough        Review of Systems   Constitutional:  Negative for activity change, appetite change, chills, diaphoresis, fatigue, fever and unexpected weight change.   HENT:  Negative for congestion, dental problem, drooling, ear discharge, ear pain, facial swelling, hearing loss, mouth sores, nosebleeds, postnasal drip, rhinorrhea, sinus pressure, sneezing, sore throat, tinnitus, trouble swallowing and voice change.    Eyes:  Negative for photophobia, pain, discharge, redness, itching and visual disturbance.   Respiratory:  Negative for apnea, cough, choking, chest tightness, shortness of breath, wheezing and stridor.    Cardiovascular:  Negative for chest pain, palpitations and leg swelling.   Gastrointestinal:  Negative for abdominal distention, abdominal pain, anal bleeding, blood in stool, constipation, diarrhea, nausea, rectal pain " and vomiting.   Endocrine: Negative for cold intolerance, heat intolerance, polydipsia, polyphagia and polyuria.   Genitourinary:  Negative for decreased urine volume, difficulty urinating, dysuria, enuresis, flank pain, frequency, genital sores, hematuria and urgency.   Musculoskeletal:  Positive for arthralgias. Negative for back pain, gait problem, joint swelling, myalgias, neck pain and neck stiffness.   Skin:  Negative for color change, pallor, rash and wound.   Allergic/Immunologic: Negative for environmental allergies, food allergies and immunocompromised state.   Neurological:  Negative for dizziness, tremors, seizures, syncope, facial asymmetry, speech difficulty, weakness, light-headedness, numbness and headaches.   Hematological:  Negative for adenopathy. Does not bruise/bleed easily.   Psychiatric/Behavioral:  Negative for agitation, behavioral problems, confusion, decreased concentration, dysphoric mood, hallucinations, self-injury, sleep disturbance and suicidal ideas. The patient is not nervous/anxious and is not hyperactive.         Objective      Physical Exam  There were no vitals taken for this visit.    There is no height or weight on file to calculate BMI.    General:   Mental Status:  Alert   Appearance: Cooperative, in no acute distress   Posture: Normal    Assessment and Plan     Diagnoses and all orders for this visit:    1. Primary osteoarthritis of both knees (Primary)  -     - Large Joint Arthrocentesis: bilateral knee        1. Primary osteoarthritis of both knees        Procedure Note:  The potential benefits of performing a therapeutic knee joint visco supplementation injection, as well as potential risks (including, but not limited to infection, swelling, pain, bleeding, bruising, nerve/blood vessel damage, and pseudoseptic reaction) have been discussed with the patient.  After informed consent, timeout procedure was performed, and the skin on the right and left knee was prepped with  chlorhexidine soap and alcohol, after which ethyl chloride was applied to the skin at the injection site. Via the anterolateral approach, Euflexxa was injected into the knee joint.  The patient tolerated the procedure well. There were no complications.  Band-Aid was applied to the injection site. Post-procedural instructions discussed with the patient and/or their caregiver.    Return in about 1 week (around 8/6/2025) for injection.    Nazario Mares MD  07/30/25  08:56 EDT    Dictated Utilizing Dragon Dictation

## 2025-08-06 ENCOUNTER — CLINICAL SUPPORT (OUTPATIENT)
Dept: ORTHOPEDIC SURGERY | Facility: CLINIC | Age: 65
End: 2025-08-06
Payer: COMMERCIAL

## 2025-08-06 DIAGNOSIS — M17.0 PRIMARY OSTEOARTHRITIS OF BOTH KNEES: Primary | ICD-10-CM

## 2025-08-13 ENCOUNTER — CLINICAL SUPPORT (OUTPATIENT)
Dept: ORTHOPEDIC SURGERY | Facility: CLINIC | Age: 65
End: 2025-08-13
Payer: COMMERCIAL

## 2025-08-13 DIAGNOSIS — M17.0 PRIMARY OSTEOARTHRITIS OF BOTH KNEES: Primary | ICD-10-CM

## (undated) DEVICE — CATH DIAG EXPO .056 AR1 6F 100CM

## (undated) DEVICE — CATH DIAG EXPO .056 FL3.5 6F 100CM

## (undated) DEVICE — DEV COMP RAD PRELUDESYNC 24CM

## (undated) DEVICE — GW TPR/CORE CERBRL HEP HN .035 15X260

## (undated) DEVICE — ST INF PRI SMRTSTE 20DRP 2VLV 24ML 117

## (undated) DEVICE — CATH DIAG EXPO .056 ARMOD 6F 100CM

## (undated) DEVICE — CATH DIAG EXPO M/ PK 6FR FL4/FR4 PIG 3PK

## (undated) DEVICE — INTRO SHEATH PRELUDE IDEAL SPRNG COIL .021 6F 16X80CM

## (undated) DEVICE — KT MANIFOLD CATHLAB CUST

## (undated) DEVICE — PK CATH CARD 10

## (undated) DEVICE — CATH DIAG EXPO .056 AL1 6F 100CM

## (undated) DEVICE — RADIFOCUS OPTITORQUE ANGIOGRAPHIC CATHETER: Brand: OPTITORQUE

## (undated) DEVICE — ST EXT IV SMARTSITE 2VLV SP M LL 5ML IV1